# Patient Record
Sex: FEMALE | Race: WHITE | NOT HISPANIC OR LATINO | Employment: OTHER | URBAN - METROPOLITAN AREA
[De-identification: names, ages, dates, MRNs, and addresses within clinical notes are randomized per-mention and may not be internally consistent; named-entity substitution may affect disease eponyms.]

---

## 2024-06-21 ENCOUNTER — HOSPITAL ENCOUNTER (OUTPATIENT)
Facility: HOSPITAL | Age: 84
Setting detail: OBSERVATION
Discharge: HOME | End: 2024-06-22
Attending: EMERGENCY MEDICINE | Admitting: STUDENT IN AN ORGANIZED HEALTH CARE EDUCATION/TRAINING PROGRAM
Payer: MEDICARE

## 2024-06-21 ENCOUNTER — APPOINTMENT (OUTPATIENT)
Dept: RADIOLOGY | Facility: HOSPITAL | Age: 84
End: 2024-06-21
Payer: MEDICARE

## 2024-06-21 ENCOUNTER — APPOINTMENT (OUTPATIENT)
Dept: CARDIOLOGY | Facility: HOSPITAL | Age: 84
End: 2024-06-21
Payer: MEDICARE

## 2024-06-21 DIAGNOSIS — R55 SYNCOPE AND COLLAPSE: ICD-10-CM

## 2024-06-21 DIAGNOSIS — R60.0 LOCALIZED EDEMA: ICD-10-CM

## 2024-06-21 DIAGNOSIS — E83.42 HYPOMAGNESEMIA: ICD-10-CM

## 2024-06-21 DIAGNOSIS — R55 SYNCOPE, UNSPECIFIED SYNCOPE TYPE: Primary | ICD-10-CM

## 2024-06-21 DIAGNOSIS — I49.8 OTHER CARDIAC ARRHYTHMIA: ICD-10-CM

## 2024-06-21 PROBLEM — E55.9 VITAMIN D DEFICIENCY: Status: ACTIVE | Noted: 2023-06-27

## 2024-06-21 PROBLEM — I10 ESSENTIAL HYPERTENSION: Status: ACTIVE | Noted: 2023-06-27

## 2024-06-21 PROBLEM — D64.9 ANEMIA: Status: ACTIVE | Noted: 2024-01-11

## 2024-06-21 PROBLEM — I49.9 CARDIAC ARRHYTHMIA: Status: ACTIVE | Noted: 2024-06-21

## 2024-06-21 PROBLEM — N18.32 STAGE 3B CHRONIC KIDNEY DISEASE (MULTI): Status: ACTIVE | Noted: 2024-01-11

## 2024-06-21 PROBLEM — E03.9 HYPOTHYROIDISM, UNSPECIFIED: Status: ACTIVE | Noted: 2021-10-23

## 2024-06-21 PROBLEM — E78.2 MIXED HYPERLIPIDEMIA: Status: ACTIVE | Noted: 2023-06-27

## 2024-06-21 LAB
ALBUMIN SERPL BCP-MCNC: 3.5 G/DL (ref 3.4–5)
ALP SERPL-CCNC: 38 U/L (ref 33–136)
ALT SERPL W P-5'-P-CCNC: 11 U/L (ref 7–45)
ANION GAP SERPL CALC-SCNC: 13 MMOL/L (ref 10–20)
AST SERPL W P-5'-P-CCNC: 17 U/L (ref 9–39)
BASOPHILS # BLD AUTO: 0.03 X10*3/UL (ref 0–0.1)
BASOPHILS NFR BLD AUTO: 0.5 %
BILIRUB SERPL-MCNC: 0.5 MG/DL (ref 0–1.2)
BNP SERPL-MCNC: 83 PG/ML (ref 0–99)
BUN SERPL-MCNC: 36 MG/DL (ref 6–23)
CALCIUM SERPL-MCNC: 8.9 MG/DL (ref 8.6–10.3)
CARDIAC TROPONIN I PNL SERPL HS: 4 NG/L (ref 0–13)
CARDIAC TROPONIN I PNL SERPL HS: 4 NG/L (ref 0–13)
CHLORIDE SERPL-SCNC: 108 MMOL/L (ref 98–107)
CO2 SERPL-SCNC: 25 MMOL/L (ref 21–32)
CREAT SERPL-MCNC: 1.8 MG/DL (ref 0.5–1.05)
D DIMER PPP FEU-MCNC: 1738 NG/ML FEU
EGFRCR SERPLBLD CKD-EPI 2021: 27 ML/MIN/1.73M*2
EOSINOPHIL # BLD AUTO: 0.04 X10*3/UL (ref 0–0.4)
EOSINOPHIL NFR BLD AUTO: 0.7 %
ERYTHROCYTE [DISTWIDTH] IN BLOOD BY AUTOMATED COUNT: 14.5 % (ref 11.5–14.5)
GLUCOSE SERPL-MCNC: 103 MG/DL (ref 74–99)
HCT VFR BLD AUTO: 34.5 % (ref 36–46)
HGB BLD-MCNC: 10.9 G/DL (ref 12–16)
IMM GRANULOCYTES # BLD AUTO: 0.03 X10*3/UL (ref 0–0.5)
IMM GRANULOCYTES NFR BLD AUTO: 0.5 % (ref 0–0.9)
INR PPP: 1 (ref 0.9–1.1)
LYMPHOCYTES # BLD AUTO: 1.36 X10*3/UL (ref 0.8–3)
LYMPHOCYTES NFR BLD AUTO: 22.9 %
MAGNESIUM SERPL-MCNC: 1.51 MG/DL (ref 1.6–2.4)
MCH RBC QN AUTO: 29.6 PG (ref 26–34)
MCHC RBC AUTO-ENTMCNC: 31.6 G/DL (ref 32–36)
MCV RBC AUTO: 94 FL (ref 80–100)
MONOCYTES # BLD AUTO: 0.45 X10*3/UL (ref 0.05–0.8)
MONOCYTES NFR BLD AUTO: 7.6 %
NEUTROPHILS # BLD AUTO: 4.03 X10*3/UL (ref 1.6–5.5)
NEUTROPHILS NFR BLD AUTO: 67.8 %
NRBC BLD-RTO: 0 /100 WBCS (ref 0–0)
PLATELET # BLD AUTO: 159 X10*3/UL (ref 150–450)
POTASSIUM SERPL-SCNC: 4.6 MMOL/L (ref 3.5–5.3)
PROT SERPL-MCNC: 6.2 G/DL (ref 6.4–8.2)
PROTHROMBIN TIME: 11.4 SECONDS (ref 9.8–12.8)
RBC # BLD AUTO: 3.68 X10*6/UL (ref 4–5.2)
SODIUM SERPL-SCNC: 141 MMOL/L (ref 136–145)
T4 FREE SERPL-MCNC: 1.38 NG/DL (ref 0.61–1.12)
TSH SERPL-ACNC: 0.24 MIU/L (ref 0.44–3.98)
WBC # BLD AUTO: 5.9 X10*3/UL (ref 4.4–11.3)

## 2024-06-21 PROCEDURE — 83735 ASSAY OF MAGNESIUM: CPT

## 2024-06-21 PROCEDURE — 85610 PROTHROMBIN TIME: CPT

## 2024-06-21 PROCEDURE — 36415 COLL VENOUS BLD VENIPUNCTURE: CPT

## 2024-06-21 PROCEDURE — A9540 TC99M MAA: HCPCS | Performed by: EMERGENCY MEDICINE

## 2024-06-21 PROCEDURE — 96366 THER/PROPH/DIAG IV INF ADDON: CPT | Mod: 59 | Performed by: STUDENT IN AN ORGANIZED HEALTH CARE EDUCATION/TRAINING PROGRAM

## 2024-06-21 PROCEDURE — 84439 ASSAY OF FREE THYROXINE: CPT | Performed by: EMERGENCY MEDICINE

## 2024-06-21 PROCEDURE — 78830 RP LOCLZJ TUM SPECT W/CT 1: CPT

## 2024-06-21 PROCEDURE — 84484 ASSAY OF TROPONIN QUANT: CPT

## 2024-06-21 PROCEDURE — 71045 X-RAY EXAM CHEST 1 VIEW: CPT

## 2024-06-21 PROCEDURE — 84443 ASSAY THYROID STIM HORMONE: CPT | Performed by: EMERGENCY MEDICINE

## 2024-06-21 PROCEDURE — 93005 ELECTROCARDIOGRAM TRACING: CPT

## 2024-06-21 PROCEDURE — 3430000001 HC RX 343 DIAGNOSTIC RADIOPHARMACEUTICALS: Performed by: EMERGENCY MEDICINE

## 2024-06-21 PROCEDURE — 71045 X-RAY EXAM CHEST 1 VIEW: CPT | Performed by: RADIOLOGY

## 2024-06-21 PROCEDURE — 96365 THER/PROPH/DIAG IV INF INIT: CPT | Mod: 59 | Performed by: STUDENT IN AN ORGANIZED HEALTH CARE EDUCATION/TRAINING PROGRAM

## 2024-06-21 PROCEDURE — 85025 COMPLETE CBC W/AUTO DIFF WBC: CPT

## 2024-06-21 PROCEDURE — 99285 EMERGENCY DEPT VISIT HI MDM: CPT | Performed by: EMERGENCY MEDICINE

## 2024-06-21 PROCEDURE — 96372 THER/PROPH/DIAG INJ SC/IM: CPT | Performed by: NURSE PRACTITIONER

## 2024-06-21 PROCEDURE — 85379 FIBRIN DEGRADATION QUANT: CPT | Performed by: EMERGENCY MEDICINE

## 2024-06-21 PROCEDURE — G0378 HOSPITAL OBSERVATION PER HR: HCPCS

## 2024-06-21 PROCEDURE — 84075 ASSAY ALKALINE PHOSPHATASE: CPT

## 2024-06-21 PROCEDURE — 2500000004 HC RX 250 GENERAL PHARMACY W/ HCPCS (ALT 636 FOR OP/ED): Performed by: NURSE PRACTITIONER

## 2024-06-21 PROCEDURE — 78830 RP LOCLZJ TUM SPECT W/CT 1: CPT | Performed by: RADIOLOGY

## 2024-06-21 PROCEDURE — 83880 ASSAY OF NATRIURETIC PEPTIDE: CPT

## 2024-06-21 PROCEDURE — 84484 ASSAY OF TROPONIN QUANT: CPT | Mod: 91

## 2024-06-21 PROCEDURE — 2500000004 HC RX 250 GENERAL PHARMACY W/ HCPCS (ALT 636 FOR OP/ED): Performed by: EMERGENCY MEDICINE

## 2024-06-21 PROCEDURE — 93010 ELECTROCARDIOGRAM REPORT: CPT | Performed by: EMERGENCY MEDICINE

## 2024-06-21 PROCEDURE — 93971 EXTREMITY STUDY: CPT

## 2024-06-21 PROCEDURE — 93971 EXTREMITY STUDY: CPT | Performed by: INTERNAL MEDICINE

## 2024-06-21 PROCEDURE — 99285 EMERGENCY DEPT VISIT HI MDM: CPT | Mod: 25

## 2024-06-21 RX ORDER — ATORVASTATIN CALCIUM 10 MG/1
10 TABLET, FILM COATED ORAL DAILY
COMMUNITY

## 2024-06-21 RX ORDER — PIOGLITAZONEHYDROCHLORIDE 15 MG/1
15 TABLET ORAL DAILY
COMMUNITY

## 2024-06-21 RX ORDER — ONDANSETRON HYDROCHLORIDE 2 MG/ML
4 INJECTION, SOLUTION INTRAVENOUS EVERY 8 HOURS PRN
Status: DISCONTINUED | OUTPATIENT
Start: 2024-06-21 | End: 2024-06-22 | Stop reason: HOSPADM

## 2024-06-21 RX ORDER — ACETAMINOPHEN 325 MG/1
650 TABLET ORAL EVERY 4 HOURS PRN
Status: DISCONTINUED | OUTPATIENT
Start: 2024-06-21 | End: 2024-06-22 | Stop reason: HOSPADM

## 2024-06-21 RX ORDER — OLMESARTAN MEDOXOMIL 20 MG/1
20 TABLET ORAL DAILY
COMMUNITY

## 2024-06-21 RX ORDER — ONDANSETRON 4 MG/1
4 TABLET, ORALLY DISINTEGRATING ORAL EVERY 8 HOURS PRN
Status: DISCONTINUED | OUTPATIENT
Start: 2024-06-21 | End: 2024-06-22 | Stop reason: HOSPADM

## 2024-06-21 RX ORDER — VALSARTAN 160 MG/1
160 TABLET ORAL DAILY
Status: DISCONTINUED | OUTPATIENT
Start: 2024-06-22 | End: 2024-06-22 | Stop reason: HOSPADM

## 2024-06-21 RX ORDER — OLMESARTAN MEDOXOMIL 20 MG/1
20 TABLET ORAL DAILY
Status: DISCONTINUED | OUTPATIENT
Start: 2024-06-21 | End: 2024-06-21

## 2024-06-21 RX ORDER — ACETAMINOPHEN 160 MG/5ML
650 SOLUTION ORAL EVERY 4 HOURS PRN
Status: DISCONTINUED | OUTPATIENT
Start: 2024-06-21 | End: 2024-06-22 | Stop reason: HOSPADM

## 2024-06-21 RX ORDER — ACETAMINOPHEN 650 MG/1
650 SUPPOSITORY RECTAL EVERY 4 HOURS PRN
Status: DISCONTINUED | OUTPATIENT
Start: 2024-06-21 | End: 2024-06-22 | Stop reason: HOSPADM

## 2024-06-21 RX ORDER — HEPARIN SODIUM 5000 [USP'U]/ML
5000 INJECTION, SOLUTION INTRAVENOUS; SUBCUTANEOUS EVERY 8 HOURS SCHEDULED
Status: DISCONTINUED | OUTPATIENT
Start: 2024-06-21 | End: 2024-06-22 | Stop reason: HOSPADM

## 2024-06-21 RX ORDER — LEVOTHYROXINE SODIUM 88 UG/1
88 CAPSULE ORAL
COMMUNITY

## 2024-06-21 RX ORDER — LEVOTHYROXINE SODIUM 88 UG/1
88 TABLET ORAL
Status: DISCONTINUED | OUTPATIENT
Start: 2024-06-22 | End: 2024-06-22 | Stop reason: HOSPADM

## 2024-06-21 RX ORDER — MAGNESIUM SULFATE HEPTAHYDRATE 40 MG/ML
2 INJECTION, SOLUTION INTRAVENOUS ONCE
Status: COMPLETED | OUTPATIENT
Start: 2024-06-21 | End: 2024-06-21

## 2024-06-21 RX ORDER — ATORVASTATIN CALCIUM 10 MG/1
10 TABLET, FILM COATED ORAL DAILY
Status: DISCONTINUED | OUTPATIENT
Start: 2024-06-21 | End: 2024-06-22 | Stop reason: HOSPADM

## 2024-06-21 RX ORDER — POLYETHYLENE GLYCOL 3350 17 G/17G
17 POWDER, FOR SOLUTION ORAL DAILY PRN
Status: DISCONTINUED | OUTPATIENT
Start: 2024-06-21 | End: 2024-06-22 | Stop reason: HOSPADM

## 2024-06-21 SDOH — ECONOMIC STABILITY: TRANSPORTATION INSECURITY
IN THE PAST 12 MONTHS, HAS LACK OF TRANSPORTATION KEPT YOU FROM MEETINGS, WORK, OR FROM GETTING THINGS NEEDED FOR DAILY LIVING?: PATIENT DECLINED

## 2024-06-21 SDOH — ECONOMIC STABILITY: INCOME INSECURITY: HOW HARD IS IT FOR YOU TO PAY FOR THE VERY BASICS LIKE FOOD, HOUSING, MEDICAL CARE, AND HEATING?: PATIENT DECLINED

## 2024-06-21 SDOH — ECONOMIC STABILITY: TRANSPORTATION INSECURITY
IN THE PAST 12 MONTHS, HAS THE LACK OF TRANSPORTATION KEPT YOU FROM MEDICAL APPOINTMENTS OR FROM GETTING MEDICATIONS?: PATIENT DECLINED

## 2024-06-21 SDOH — ECONOMIC STABILITY: INCOME INSECURITY: IN THE LAST 12 MONTHS, WAS THERE A TIME WHEN YOU WERE NOT ABLE TO PAY THE MORTGAGE OR RENT ON TIME?: PATIENT DECLINED

## 2024-06-21 SDOH — ECONOMIC STABILITY: HOUSING INSECURITY
IN THE LAST 12 MONTHS, WAS THERE A TIME WHEN YOU DID NOT HAVE A STEADY PLACE TO SLEEP OR SLEPT IN A SHELTER (INCLUDING NOW)?: PATIENT DECLINED

## 2024-06-21 SDOH — SOCIAL STABILITY: SOCIAL INSECURITY: WERE YOU ABLE TO COMPLETE ALL THE BEHAVIORAL HEALTH SCREENINGS?: YES

## 2024-06-21 SDOH — ECONOMIC STABILITY: HOUSING INSECURITY: IN THE LAST 12 MONTHS, HOW MANY PLACES HAVE YOU LIVED?: 1

## 2024-06-21 SDOH — SOCIAL STABILITY: SOCIAL INSECURITY: HAVE YOU HAD THOUGHTS OF HARMING ANYONE ELSE?: NO

## 2024-06-21 ASSESSMENT — COGNITIVE AND FUNCTIONAL STATUS - GENERAL
MOVING TO AND FROM BED TO CHAIR: A LITTLE
TURNING FROM BACK TO SIDE WHILE IN FLAT BAD: A LITTLE
WALKING IN HOSPITAL ROOM: A LITTLE
MOVING FROM LYING ON BACK TO SITTING ON SIDE OF FLAT BED WITH BEDRAILS: A LITTLE
DAILY ACTIVITIY SCORE: 24
PATIENT BASELINE BEDBOUND: NO
MOBILITY SCORE: 19
STANDING UP FROM CHAIR USING ARMS: A LITTLE

## 2024-06-21 ASSESSMENT — ACTIVITIES OF DAILY LIVING (ADL)
HEARING - LEFT EAR: FUNCTIONAL
TOILETING: INDEPENDENT
HEARING - RIGHT EAR: FUNCTIONAL
GROOMING: INDEPENDENT
JUDGMENT_ADEQUATE_SAFELY_COMPLETE_DAILY_ACTIVITIES: YES
WALKS IN HOME: INDEPENDENT
DRESSING YOURSELF: INDEPENDENT
ADEQUATE_TO_COMPLETE_ADL: YES
BATHING: INDEPENDENT
PATIENT'S MEMORY ADEQUATE TO SAFELY COMPLETE DAILY ACTIVITIES?: YES
FEEDING YOURSELF: INDEPENDENT

## 2024-06-21 ASSESSMENT — LIFESTYLE VARIABLES
HOW OFTEN DO YOU HAVE A DRINK CONTAINING ALCOHOL: NEVER
HOW MANY STANDARD DRINKS CONTAINING ALCOHOL DO YOU HAVE ON A TYPICAL DAY: PATIENT DOES NOT DRINK
EVER HAD A DRINK FIRST THING IN THE MORNING TO STEADY YOUR NERVES TO GET RID OF A HANGOVER: NO
AUDIT-C TOTAL SCORE: 0
HAVE PEOPLE ANNOYED YOU BY CRITICIZING YOUR DRINKING: NO
TOTAL SCORE: 0
AUDIT-C TOTAL SCORE: 0
EVER FELT BAD OR GUILTY ABOUT YOUR DRINKING: NO
HAVE YOU EVER FELT YOU SHOULD CUT DOWN ON YOUR DRINKING: NO
HOW OFTEN DO YOU HAVE 6 OR MORE DRINKS ON ONE OCCASION: NEVER
SKIP TO QUESTIONS 9-10: 1

## 2024-06-21 ASSESSMENT — PATIENT HEALTH QUESTIONNAIRE - PHQ9
SUM OF ALL RESPONSES TO PHQ9 QUESTIONS 1 & 2: 0
2. FEELING DOWN, DEPRESSED OR HOPELESS: NOT AT ALL
1. LITTLE INTEREST OR PLEASURE IN DOING THINGS: NOT AT ALL

## 2024-06-21 ASSESSMENT — COLUMBIA-SUICIDE SEVERITY RATING SCALE - C-SSRS
1. IN THE PAST MONTH, HAVE YOU WISHED YOU WERE DEAD OR WISHED YOU COULD GO TO SLEEP AND NOT WAKE UP?: NO
2. HAVE YOU ACTUALLY HAD ANY THOUGHTS OF KILLING YOURSELF?: NO
6. HAVE YOU EVER DONE ANYTHING, STARTED TO DO ANYTHING, OR PREPARED TO DO ANYTHING TO END YOUR LIFE?: NO

## 2024-06-21 ASSESSMENT — ENCOUNTER SYMPTOMS
CHEST TIGHTNESS: 0
LIGHT-HEADEDNESS: 0
WEAKNESS: 0
SHORTNESS OF BREATH: 0
PALPITATIONS: 0
CONSTITUTIONAL NEGATIVE: 1
DIZZINESS: 0
GASTROINTESTINAL NEGATIVE: 1
EYES NEGATIVE: 1
MUSCULOSKELETAL NEGATIVE: 1
PSYCHIATRIC NEGATIVE: 1

## 2024-06-21 ASSESSMENT — PAIN SCALES - GENERAL
PAINLEVEL_OUTOF10: 0 - NO PAIN
PAINLEVEL_OUTOF10: 0 - NO PAIN

## 2024-06-21 ASSESSMENT — PAIN - FUNCTIONAL ASSESSMENT
PAIN_FUNCTIONAL_ASSESSMENT: 0-10
PAIN_FUNCTIONAL_ASSESSMENT: 0-10

## 2024-06-21 NOTE — H&P
History Of Present Illness  Lyndsey Rosado is a 84 y.o. female presents to [facility] ER with chief complaint of witnessed syncopal episode. Patient was at the bank today with a friend. Patient was upset with . Friend observed patient have brief 20 second episode of syncope. She had no symptoms prior to event. When EMS arrived, they reported to ER they believe they saw a brief episode of atrial fib/atrial flutter on the cardiac monitor, ER reports patient arrived in SR.   denies symptoms currently.  No preceding chest pain, shortness of breath, palpitations, dizziness, lightheadedness, headaches, vision changes.  EMS report she had a short run of atrial flutter on their rhythm strip.  No diagnose history of atrial fibrillation and she is not anticoagulated. Patient centers more around the episode with the  and does not devote much history to events of her syncope. She does state she had no symptoms prior to her friend telling her she passed out and does not recollect event. She states she is still mad and maybe when she settles down. Currently has no complaints        Past Medical History  Age-related osteoporosis without current pathological fracture (CMS/HCC)   Congenital hypothyroidism without goiter (CMS/HCC)   Essential hypertension (CMS/HCC)   Mixed hyperlipidemia (CMS/HCC)   Type 2 diabetes mellitus without complications (CMS/HCC)   Vitamin D deficiency   Closed fracture of shaft of humerus   Disorder of bursae of shoulder region   Difficulty in walking, not elsewhere classified   History of falling   Hypertensive heart disease without heart failure (CMS/HCC)   Hypothyroidism, unspecified (CMS/HCC)   Multiple fractures of ribs, unspecified side, subsequent encounter for fracture with routine healing   Muscle weakness (generalized)   Nondisplaced fracture of greater tuberosity of left humerus, subsequent encounter for fracture with routine healing   Other specified  disorders of bone density and structure, unspecified site   Rash and other nonspecific skin eruption   Unspecified fracture of upper end of left ulna, subsequent encounter for closed fracture with routine healing   Unspecified injury of head, subsequent encounter     Surgical History  CATARACT EXTRACTION Bilateral 2012   CHOLECYSTECTOMY   HUMERUS FRACTURE SURGERY Right 2007   TONSILLECTOMY   TOTAL ABDOMINAL HYSTERECTOMY      Social History  Smoking status: Never   Smokeless tobacco: Never   Substance Use Topics   Alcohol use: Never   Drug use: Never     Family History  No family history on file.     Allergies  Patient has no known allergies.    Review of Systems   Constitutional: Negative.    HENT: Negative.     Eyes: Negative.    Respiratory:  Negative for chest tightness and shortness of breath.    Cardiovascular:  Positive for leg swelling. Negative for chest pain and palpitations.   Gastrointestinal: Negative.    Genitourinary: Negative.    Musculoskeletal: Negative.    Skin:  Positive for rash.   Neurological:  Negative for dizziness, weakness and light-headedness.   Psychiatric/Behavioral: Negative.        ROS: 12 systems reviewed and negative except per HPI above     Physical Exam by System:     Constitutional: Well developed, awake/alert/oriented x3, no distress, alert and cooperative   ENMT: mucous membranes moist   Head/Neck: Neck supple   Respiratory/Thorax: Patent airways, CTAB, normal breath sounds with good chest expansion, thorax symmetric   Cardiovascular: Regular, rate and rhythm, no murmurs, 2+ equal pulses of the extremities, normal S 1and S 2   Gastrointestinal: Nondistended, soft, non-tender, no rebound tenderness or guarding, no masses palpable, no organomegaly, +BS, no bruits   Musculoskeletal: ROM intact, no joint swelling, normal strength   Extremities: normal extremities, no cyanosis edema, contusions or wounds, no clubbing   Neurological: alert and oriented x3, intact senses, motor,  "response and reflexes, normal strength  SKIN-has rash on lower extremities and over face, upper arms-states has had it for a while and its getting better, did not know why she had it       Last Recorded Vitals  Blood pressure 176/55, pulse 56, temperature 36.1 °C (97 °F), temperature source Temporal, resp. rate 19, height 1.651 m (5' 5\"), weight 81.6 kg (180 lb), SpO2 100%.    Relevant Results  Results for orders placed or performed during the hospital encounter of 06/21/24 (from the past 24 hour(s))   CBC and Auto Differential   Result Value Ref Range    WBC 5.9 4.4 - 11.3 x10*3/uL    nRBC 0.0 0.0 - 0.0 /100 WBCs    RBC 3.68 (L) 4.00 - 5.20 x10*6/uL    Hemoglobin 10.9 (L) 12.0 - 16.0 g/dL    Hematocrit 34.5 (L) 36.0 - 46.0 %    MCV 94 80 - 100 fL    MCH 29.6 26.0 - 34.0 pg    MCHC 31.6 (L) 32.0 - 36.0 g/dL    RDW 14.5 11.5 - 14.5 %    Platelets 159 150 - 450 x10*3/uL    Neutrophils % 67.8 40.0 - 80.0 %    Immature Granulocytes %, Automated 0.5 0.0 - 0.9 %    Lymphocytes % 22.9 13.0 - 44.0 %    Monocytes % 7.6 2.0 - 10.0 %    Eosinophils % 0.7 0.0 - 6.0 %    Basophils % 0.5 0.0 - 2.0 %    Neutrophils Absolute 4.03 1.60 - 5.50 x10*3/uL    Immature Granulocytes Absolute, Automated 0.03 0.00 - 0.50 x10*3/uL    Lymphocytes Absolute 1.36 0.80 - 3.00 x10*3/uL    Monocytes Absolute 0.45 0.05 - 0.80 x10*3/uL    Eosinophils Absolute 0.04 0.00 - 0.40 x10*3/uL    Basophils Absolute 0.03 0.00 - 0.10 x10*3/uL   Comprehensive metabolic panel   Result Value Ref Range    Glucose 103 (H) 74 - 99 mg/dL    Sodium 141 136 - 145 mmol/L    Potassium 4.6 3.5 - 5.3 mmol/L    Chloride 108 (H) 98 - 107 mmol/L    Bicarbonate 25 21 - 32 mmol/L    Anion Gap 13 10 - 20 mmol/L    Urea Nitrogen 36 (H) 6 - 23 mg/dL    Creatinine 1.80 (H) 0.50 - 1.05 mg/dL    eGFR 27 (L) >60 mL/min/1.73m*2    Calcium 8.9 8.6 - 10.3 mg/dL    Albumin 3.5 3.4 - 5.0 g/dL    Alkaline Phosphatase 38 33 - 136 U/L    Total Protein 6.2 (L) 6.4 - 8.2 g/dL    AST 17 9 - 39 U/L "    Bilirubin, Total 0.5 0.0 - 1.2 mg/dL    ALT 11 7 - 45 U/L   B-Type Natriuretic Peptide   Result Value Ref Range    BNP 83 0 - 99 pg/mL   Protime-INR   Result Value Ref Range    Protime 11.4 9.8 - 12.8 seconds    INR 1.0 0.9 - 1.1   Troponin I, High Sensitivity, Initial   Result Value Ref Range    Troponin I, High Sensitivity 4 0 - 13 ng/L   Magnesium   Result Value Ref Range    Magnesium 1.51 (L) 1.60 - 2.40 mg/dL   D-dimer, VTE Exclusion   Result Value Ref Range    D-Dimer, Quantitative VTE Exclusion 1,738 (H) <=500 ng/mL FEU   TSH with reflex to Free T4 if abnormal   Result Value Ref Range    Thyroid Stimulating Hormone 0.24 (L) 0.44 - 3.98 mIU/L   Thyroxine, Free   Result Value Ref Range    Thyroxine, Free 1.38 (H) 0.61 - 1.12 ng/dL   Troponin, High Sensitivity, 1 Hour   Result Value Ref Range    Troponin I, High Sensitivity 4 0 - 13 ng/L      Scheduled medications    Continuous medications    PRN medications       NM Lung perfusion with spect/ct    Result Date: 6/21/2024  Interpreted By:  Dajuan Salazar, STUDY: NM LUNG PERFUSION WITH SPECT/CT;  6/21/2024 1:59 pm   INDICATION: Signs/Symptoms:pos dimer, syncope.   COMPARISON: None.   ACCESSION NUMBER(S): [id number]   ORDERING CLINICIAN: ESSENCE DUBON   TECHNIQUE: DIVISION OF NUCLEAR MEDICINE PERFUSION LUNG SCAN   Multiple perfusion images of the lungs were obtained after the intravenous administration of  4.2 mCi of Tc-99m MAA. SPECT CT images of the lungs were also obtained.     FINDINGS: Perfusion images demonstrate mild heterogeneity without evidence of wedge-shaped abnormalities to suggest acute pulmonary embolism.       Low probability of acute pulmonary embolism.   Images were interpreted at [facility].   Signed by: Dajuan Salazar 6/21/2024 2:22 PM Dictation workstation:   [id number]    Lower extremity venous duplex left    Result Date: 6/21/2024  Preliminary Cardiology Report            [facility] [address]  [place] [address]     Tel [phone] Fax [phone]       Preliminary Vascular Lab Report  VASC US LOWER EXTREMITY VENOUS DUPLEX LEFT  Patient Name:     GISELLA POE Kenny Physician:  [id number] Jaqui Vivar MD Study Date:       6/21/2024         Ordering Provider:  [id number] ESSENCE DUBON MRN/PID:          [id number]          Fellow: Accession#:       [id number]      Technologist:       Nathaly Hernandez RVT YOB: 1940         Technologist 2: Gender:           F                 Encounter#:         [id number] Admission Status: Emergency         Location Performed: [facility]  Diagnosis/ICD: Localized (leg) edema-R60.0 Indication:    Limb edema CPT Codes:     18603 Peripheral venous duplex scan for DVT Limited  Pertinent History: HTN and Hyperlipidemia. DM.  PRELIMINARY CONCLUSIONS:  Right Lower Venous: The right common femoral vein demonstrates normal spontaneous and respirophasic flow. Left Lower Venous: No evidence of acute deep vein thrombus visualized in the left lower extremity. There are chronic changes visualized in the popliteal vein. Additional Findings; Lymph nodes Measuring 2.05 cm x 0.71 cm.  Imaging & Doppler Findings:  Left                  Compress Thrombus        Flow Distal External Iliac            None CFV                     Yes      None   Spontaneous/Phasic PFV                     Yes      None FV Proximal             Yes      None   Spontaneous/Phasic FV Mid                  Yes      None FV Distal               Yes      None Popliteal               Yes    Chronic  Spontaneous/Phasic Peroneal                Yes      None PTV                     Yes      None VASCULAR PRELIMINARY REPORT completed by Nathaly Hernandez RVT on 6/21/2024 at 12:58:18 PM  ** Final **     Vascular US lower extremity venous duplex left    Result Date: 6/21/2024  Preliminary Cardiology Report          [facility] [address] [place] [address]     Tel [phone]  Fax [phone]      Preliminary Vascular Lab Report VASC US LOWER EXTREMITY VENOUS DUPLEX LEFT Patient Name:     GISELLA POE Reading Physician:  [id number] Jaqui Vivar MD Study Date:       6/21/2024         Ordering Provider:  [id number] ESSENCE DUBON MRN/PID:          [id number]          Fellow: Accession#:       [id number]      Technologist:       Nathaly Hernandez RVT YOB: 1940         Technologist 2: Gender:           F                 Encounter#:         [id number] Admission Status: Emergency         Location Performed: [facility] Diagnosis/ICD: Localized (leg) edema-R60.0 Indication:    Limb edema CPT Codes:     57002 Peripheral venous duplex scan for DVT Limited Pertinent History: HTN and Hyperlipidemia. DM. PRELIMINARY CONCLUSIONS: Right Lower Venous: The right common femoral vein demonstrates normal spontaneous and respirophasic flow. Left Lower Venous: No evidence of acute deep vein thrombus visualized in the left lower extremity. There are chronic changes visualized in the popliteal vein. Additional Findings; Lymph nodes Measuring 2.05 cm x 0.71 cm. Imaging \T\ Doppler Findings: Left                  Compress Thrombus        Flow Distal External Iliac            None CFV                     Yes      None   Spontaneous/Phasic PFV                     Yes      None FV Proximal             Yes      None   Spontaneous/Phasic FV Mid                  Yes      None FV Distal               Yes      None Popliteal               Yes    Chronic  Spontaneous/Phasic Peroneal                Yes      None PTV                     Yes      None VASCULAR PRELIMINARY REPORT completed by Nathaly Hernandez RVT on 6/21/2024 at 12:58:18 PM ** Final **    XR chest 1 view    Result Date: 6/21/2024  Interpreted By:  Destin Paz, STUDY: XR CHEST 1 VIEW; 6/21/2024 12:23 pm   INDICATION: CLINICAL INFORMATION: Signs/Symptoms:syncope.   COMPARISON: 08/01/2023   ACCESSION NUMBER(S): [id number]   ORDERING CLINICIAN:  RHEA CROSS   TECHNIQUE: Portable chest one view.   FINDINGS: The cardiac size is indeterminate in view of the AP projection.    No infiltrates or effusions are identified.  Incidental note is made of a screw and plate fixation device involving the mid shaft of the right humerus partially included within the field of view. Spurring is noted throughout the spine.       No acute pathologic findings are identified.   MACRO: none   Signed by: Destin Paz 6/21/2024 12:39 PM Dictation workstation:   [id number]       Assessment/Plan   Principal Problem:    Syncope  Active Problems:    Mixed hyperlipidemia    Hypothyroidism, unspecified    Essential hypertension    Anemia    Stage 3b chronic kidney disease (Multi)    Vitamin D deficiency    Cardiac arrhythmia    Hypomagnesemia      Plan:    Admit to observation with tele  Monitor for any cardiac arrhythmias overnight, benefit from cardiac event monitor on discharge  EKG reviewed and compared to previous SR with RBBB  TSH obtained 0.24, Free t4 1.38  Magnesium 1.51, received 2 G magnesium in ED, monitor magnesium on am labs   Hx anemia, stable 10.9 which is above baseline  Cr slightly elevated than baseline, history CKD 3b  Obtain orthostatic vital signs  Elevated d dimer, vq scan obtained with low probability for PE  Resume patient medications  Am labs including cbc, bmp, mag  Dvtp: Heparin sub q  POC discussed with patient and attending  Dispo: likely require less than 2 midnight stays to adequately treat and safely discharge tomorrow to home      Code status: Full Code     I spent >50 minutes in the professional and overall care of this patient.    SIGNATURE: LETI Rose PATIENT NAME: Lyndsey Rosado   DATE: June 21, 2024 MRN: [id number]   TIME: 3:27 PM    Isabela Clifford CNP  [facility]  [address] [address]  Phone: [phone] Fax: [phone]

## 2024-06-21 NOTE — ED PROVIDER NOTES
HPI   Chief Complaint   Patient presents with    Syncope       84-year-old female with past medical history of hypertension, hyperlipidemia, diabetes, hypothyroidism, CKD, basal cell carcinoma of the head presenting to the ED for syncope.  Patient was at the bank this morning in a seated position attempting to withdraw money when her boss noticed that she had a syncopal episode for about 20 seconds.  Patient denies symptoms prior to her syncope and denies symptoms currently.  No preceding chest pain, shortness of breath, palpitations, dizziness, lightheadedness, headaches, vision changes.  EMS report she had a short run of atrial flutter on their rhythm strip.  No diagnose history of atrial fibrillation and she is not anticoagulated.                          [place] Coma Scale Score: 15                     Patient History   Past Medical History:   Diagnosis Date    [place] (hard of hearing)     Hypothyroid      History reviewed. No pertinent surgical history.  No family history on file.  Social History     Tobacco Use    Smoking status: Never    Smokeless tobacco: Never   Substance Use Topics    Alcohol use: Never    Drug use: Not on file       Physical Exam   ED Triage Vitals [06/21/24 1047]   Temperature Heart Rate Respirations BP   36.1 °C (97 °F) 73 18 126/53      Pulse Ox Temp Source Heart Rate Source Patient Position   99 % Temporal -- --      BP Location FiO2 (%)     Right arm --       Physical Exam  Vitals and nursing note reviewed.   Constitutional:       General: She is not in acute distress.     Appearance: She is not ill-appearing or toxic-appearing.   HENT:      Head: Normocephalic and atraumatic.      Nose: Nose normal.      Mouth/Throat:      Mouth: Mucous membranes are moist.   Eyes:      Extraocular Movements: Extraocular movements intact.      Conjunctiva/sclera: Conjunctivae normal.      Pupils: Pupils are equal, round, and reactive to light.   Cardiovascular:      Rate and Rhythm: Normal rate and  regular rhythm.      Pulses: Normal pulses.      Heart sounds: Normal heart sounds.   Pulmonary:      Effort: Pulmonary effort is normal.      Breath sounds: Normal breath sounds.   Abdominal:      General: There is no distension.      Palpations: Abdomen is soft.      Tenderness: There is no abdominal tenderness.   Musculoskeletal:         General: No tenderness. Normal range of motion.      Cervical back: Normal range of motion and neck supple.      Right lower leg: No edema.      Left lower leg: Edema present.   Skin:     General: Skin is warm and dry.      Capillary Refill: Capillary refill takes less than 2 seconds.   Neurological:      General: No focal deficit present.      Mental Status: She is alert and oriented to person, place, and time. Mental status is at baseline.      Cranial Nerves: No cranial nerve deficit.      Sensory: No sensory deficit.      Motor: No weakness.         ED Course & MDM   ED Course as of 06/21/24 1503   Fri Jun 21, 2024   1458 84-year-old female presenting to the ED for syncope while sitting.  This was witnessed.  She is alert and neurologically intact on my evaluation.  She does have hypomagnesemia that was replaced.  Troponins are unremarkable however does have elevated D-dimer.  VQ scan was obtained for PE rule out given her renal function.  This is low probability for PE.  No evidence of DVT on lower extremity duplex.  Renal function appears to be consistent with her CKD.    Due to syncope while seated without preceding symptoms and concern for atrial flutter on EMS telemetry we will admit for cardiac evaluation. [MJ]      ED Course User Index  [MJ] Leann Sanchez DO         Diagnoses as of 06/21/24 1503   Syncope, unspecified syncope type   Hypomagnesemia       Medical Decision Making  =================Attending note===============    The patient was seen by the resident/fellow.  I have personally performed a substantive portion of the encounter.  I have seen and examined the  patient; agree with the workup, evaluation, MDM,   management and diagnosis.  The care plan has been discussed with the resident; I have reviewed the resident's note and agree with the documented findings.      This is a 84 y.o. female who presents to ER after a syncopal episode.  She was at the bank to transfer money to her checking account so she can get her roof replaced and the bank person was questioning whether she should transfer that amount of money into her checking account she was upset.  She states she was sitting there and then she passed out.  Is very brief episode of syncope.  She was lightheaded right before she passed out.  She been feeling fine otherwise.  She states she only ate a small breakfast sandwich for breakfast this morning.  She is been eating and drinking well otherwise.  No nausea or vomiting or diarrhea.  No urinary issues.  There is mild calf swelling.  No blood thinners.  She does have hypothyroid.  She does have CHF.  Heart is regular.  Lungs are clear.  Abdomen is soft and nontender.  No distress.  She is awake and alert and oriented.  She was sitting when she passed out.  No injuries.    EKG: Sinus bradycardia with a ventricular rate of 54.  .  QTc 443.  No ST changes.    TSH is mildly low at 0.24.  TSH is mildly elevated at 1.38.  Troponin is negative.  BNP is normal.  Chemistry shows some renal insufficiency.  GFR is 27.  Magnesium is minimally low at 1.5.  D-dimer is elevated.  There is renal insufficiency so V/Q was ordered.  No leukocytosis.  No significant anemia with a hemoglobin of 10.9.  Chest x-ray has no acute findings.  Venous duplex is negative.                  ==========================================          Procedure  Procedures     Leann Sanchez DO  Resident  06/21/24 6985

## 2024-06-22 ENCOUNTER — APPOINTMENT (OUTPATIENT)
Dept: CARDIOLOGY | Facility: HOSPITAL | Age: 84
End: 2024-06-22
Payer: MEDICARE

## 2024-06-22 VITALS
DIASTOLIC BLOOD PRESSURE: 60 MMHG | SYSTOLIC BLOOD PRESSURE: 130 MMHG | RESPIRATION RATE: 18 BRPM | BODY MASS INDEX: 34.71 KG/M2 | OXYGEN SATURATION: 99 % | HEART RATE: 63 BPM | WEIGHT: 176.81 LBS | HEIGHT: 60 IN | TEMPERATURE: 97.3 F

## 2024-06-22 LAB
ANION GAP SERPL CALC-SCNC: 10 MMOL/L (ref 10–20)
BUN SERPL-MCNC: 30 MG/DL (ref 6–23)
CALCIUM SERPL-MCNC: 8.5 MG/DL (ref 8.6–10.3)
CHLORIDE SERPL-SCNC: 113 MMOL/L (ref 98–107)
CO2 SERPL-SCNC: 23 MMOL/L (ref 21–32)
CREAT SERPL-MCNC: 1.38 MG/DL (ref 0.5–1.05)
EGFRCR SERPLBLD CKD-EPI 2021: 38 ML/MIN/1.73M*2
ERYTHROCYTE [DISTWIDTH] IN BLOOD BY AUTOMATED COUNT: 14.5 % (ref 11.5–14.5)
GLUCOSE SERPL-MCNC: 84 MG/DL (ref 74–99)
HCT VFR BLD AUTO: 33.1 % (ref 36–46)
HGB BLD-MCNC: 10.3 G/DL (ref 12–16)
MAGNESIUM SERPL-MCNC: 1.95 MG/DL (ref 1.6–2.4)
MCH RBC QN AUTO: 29.4 PG (ref 26–34)
MCHC RBC AUTO-ENTMCNC: 31.1 G/DL (ref 32–36)
MCV RBC AUTO: 95 FL (ref 80–100)
NRBC BLD-RTO: 0 /100 WBCS (ref 0–0)
PLATELET # BLD AUTO: 133 X10*3/UL (ref 150–450)
POTASSIUM SERPL-SCNC: 4.3 MMOL/L (ref 3.5–5.3)
RBC # BLD AUTO: 3.5 X10*6/UL (ref 4–5.2)
SODIUM SERPL-SCNC: 142 MMOL/L (ref 136–145)
WBC # BLD AUTO: 5.6 X10*3/UL (ref 4.4–11.3)

## 2024-06-22 PROCEDURE — G0378 HOSPITAL OBSERVATION PER HR: HCPCS

## 2024-06-22 PROCEDURE — 2500000004 HC RX 250 GENERAL PHARMACY W/ HCPCS (ALT 636 FOR OP/ED): Performed by: NURSE PRACTITIONER

## 2024-06-22 PROCEDURE — 96372 THER/PROPH/DIAG INJ SC/IM: CPT | Performed by: NURSE PRACTITIONER

## 2024-06-22 PROCEDURE — 83735 ASSAY OF MAGNESIUM: CPT | Performed by: NURSE PRACTITIONER

## 2024-06-22 PROCEDURE — 85027 COMPLETE CBC AUTOMATED: CPT | Performed by: NURSE PRACTITIONER

## 2024-06-22 PROCEDURE — 2500000001 HC RX 250 WO HCPCS SELF ADMINISTERED DRUGS (ALT 637 FOR MEDICARE OP): Performed by: STUDENT IN AN ORGANIZED HEALTH CARE EDUCATION/TRAINING PROGRAM

## 2024-06-22 PROCEDURE — 99239 HOSP IP/OBS DSCHRG MGMT >30: CPT | Performed by: STUDENT IN AN ORGANIZED HEALTH CARE EDUCATION/TRAINING PROGRAM

## 2024-06-22 PROCEDURE — 2500000002 HC RX 250 W HCPCS SELF ADMINISTERED DRUGS (ALT 637 FOR MEDICARE OP, ALT 636 FOR OP/ED): Performed by: STUDENT IN AN ORGANIZED HEALTH CARE EDUCATION/TRAINING PROGRAM

## 2024-06-22 PROCEDURE — 36415 COLL VENOUS BLD VENIPUNCTURE: CPT | Performed by: NURSE PRACTITIONER

## 2024-06-22 PROCEDURE — 80048 BASIC METABOLIC PNL TOTAL CA: CPT | Performed by: NURSE PRACTITIONER

## 2024-06-22 ASSESSMENT — PAIN SCALES - GENERAL: PAINLEVEL_OUTOF10: 0 - NO PAIN

## 2024-06-22 NOTE — DISCHARGE SUMMARY
Discharge Diagnosis  Syncope    Issues Requiring Follow-Up  Follow up with pcp    Test Results Pending At Discharge  Pending Labs       No current pending labs.            Hospital Course   Lyndsey Rosado is a 84 y.o. female presents to [facility] ER with chief complaint of witnessed syncopal episode. Patient was at the bank today with a friend. Patient was upset with . Friend observed patient have brief 20 second episode of syncope. She had no symptoms prior to event. When EMS arrived, they reported to ER they believe they saw a brief episode of atrial fib/atrial flutter on the cardiac monitor, ER reports patient arrived in SR.   denies symptoms currently.  No preceding chest pain, shortness of breath, palpitations, dizziness, lightheadedness, headaches, vision changes.  EMS report she had a short run of atrial flutter on their rhythm strip.  No diagnose history of atrial fibrillation and she is not anticoagulated. Patient centers more around the episode with the  and does not devote much history to events of her syncope. She does state she had no symptoms prior to her friend telling her she passed out and does not recollect event. She states she is still mad and maybe when she settles down. Currently has no complaints     Hospital course  Over the course of hospitalization, patient remained clinically stable.  Upon further questioning, patient states she was extremely upset with the banker due to the event that transpired at the bank, however she never syncopized, and remembers all the details very clearly.  No acute event observed overnight.  Labs did not show any acute findings.  Discussed with the patient and son Doni in great details, will hold off on further workup at this time since patient has remained clinically stable, however recommended for close monitoring over the next few days, if similar event were to recur, patient should return to the emergency room  immediately, patient and family expressed understanding.  Patient discharged in stable condition.    I have spent > 30min discharging the pt, care time spent include discharge planning, medication reconciliation and discussion of discharge instruction/follow up with the patient.        Pertinent Physical Exam At Time of Discharge  Constitutional: Well developed, awake/alert/oriented x3, no distress, alert and cooperative  Eyes: PERRL, EOMI, clear sclera  ENMT: mucous membranes moist  Head/Neck: Neck supple  Respiratory/Thorax: CTA b/l.   Cardiovascular: Regular, rate and rhythm, no murmurs  Gastrointestinal: Nondistended, soft, non-tender  Musculoskeletal: ROM intact  Extremities: normal extremities      Home Medications     Medication List      CONTINUE taking these medications     atorvastatin 10 mg tablet; Commonly known as: Lipitor   calcium carbonate-vitamin D3 600 mg-20 mcg (800 unit) tablet,chewable   levothyroxine 88 mcg capsule; Commonly known as: Tirosint   olmesartan 20 mg tablet; Commonly known as: BENIcar   pioglitazone 15 mg tablet; Commonly known as: Actos       Outpatient Follow-Up  No future appointments.    Fred Luu MD

## 2024-06-22 NOTE — CARE PLAN
The patient's goals for the shift include      The clinical goals for the shift include remain free from falls    Safety maintained no falls this shift.      Problem: Fall/Injury  Goal: Not fall by end of shift  Outcome: Progressing  Goal: Be free from injury by end of the shift  Outcome: Progressing  Goal: Verbalize understanding of personal risk factors for fall in the hospital  Outcome: Progressing  Goal: Verbalize understanding of risk factor reduction measures to prevent injury from fall in the home  Outcome: Progressing  Goal: Use assistive devices by end of the shift  Outcome: Progressing  Goal: Pace activities to prevent fatigue by end of the shift  Outcome: Progressing

## 2024-06-22 NOTE — NURSING NOTE
2100   Pt refused lipitor at this time. States takes in am on empty stomach with thyroid med in am .

## 2024-06-22 NOTE — DISCHARGE INSTRUCTIONS
#it was my pleasure taking care of you during this hospitalization    You were admitted to the hospital due to a reported event of syncope.  Since admission, you have remained clinically stable without any further observed event.  Per your report, you never actually syncopized while at the bank, but was mostly upset due to the interaction with the banker.  Since you have remained very stable since admission, as discussed with your son Doni, will hold off on extensive workup.  Please have someone stay with you at all times over the next few days, if this event were to happen again, please come back to the emergency room immediately.

## 2024-06-23 LAB
ATRIAL RATE: 110 BPM
ATRIAL RATE: 54 BPM
P AXIS: 67 DEGREES
P AXIS: 75 DEGREES
P OFFSET: 192 MS
P OFFSET: 193 MS
P ONSET: 132 MS
P ONSET: 133 MS
PR INTERVAL: 170 MS
PR INTERVAL: 172 MS
Q ONSET: 218 MS
Q ONSET: 218 MS
QRS COUNT: 9 BEATS
QRS COUNT: 9 BEATS
QRS DURATION: 118 MS
QRS DURATION: 120 MS
QT INTERVAL: 468 MS
QT INTERVAL: 488 MS
QTC CALCULATION(BAZETT): 443 MS
QTC CALCULATION(BAZETT): 470 MS
QTC FREDERICIA: 451 MS
QTC FREDERICIA: 477 MS
R AXIS: 42 DEGREES
R AXIS: 42 DEGREES
T AXIS: 17 DEGREES
T AXIS: 39 DEGREES
T OFFSET: 452 MS
T OFFSET: 462 MS
VENTRICULAR RATE: 54 BPM
VENTRICULAR RATE: 56 BPM

## 2024-12-08 ENCOUNTER — APPOINTMENT (OUTPATIENT)
Dept: RADIOLOGY | Facility: HOSPITAL | Age: 84
End: 2024-12-08
Payer: MEDICARE

## 2024-12-08 ENCOUNTER — HOSPITAL ENCOUNTER (OUTPATIENT)
Facility: HOSPITAL | Age: 84
Setting detail: OBSERVATION
Discharge: HOME HEALTH | End: 2024-12-10
Attending: STUDENT IN AN ORGANIZED HEALTH CARE EDUCATION/TRAINING PROGRAM | Admitting: INTERNAL MEDICINE
Payer: MEDICARE

## 2024-12-08 ENCOUNTER — APPOINTMENT (OUTPATIENT)
Dept: CARDIOLOGY | Facility: HOSPITAL | Age: 84
End: 2024-12-08
Payer: MEDICARE

## 2024-12-08 DIAGNOSIS — S02.2XXA CLOSED FRACTURE OF NASAL BONE, INITIAL ENCOUNTER: Primary | ICD-10-CM
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
## 2024-12-08 PROBLEM — R29.6 FALL IN ELDERLY PATIENT: Status: ACTIVE | Noted: 2024-12-08

## 2024-12-08 LAB
ALBUMIN SERPL BCP-MCNC: 3.5 G/DL (ref 3.4–5)
ALP SERPL-CCNC: 40 U/L (ref 33–136)
ALT SERPL W P-5'-P-CCNC: 8 U/L (ref 7–45)
ANION GAP SERPL CALC-SCNC: 15 MMOL/L (ref 10–20)
AST SERPL W P-5'-P-CCNC: 17 U/L (ref 9–39)
BASOPHILS # BLD AUTO: 0.02 X10*3/UL (ref 0–0.1)
BASOPHILS NFR BLD AUTO: 0.2 %
BILIRUB SERPL-MCNC: 0.5 MG/DL (ref 0–1.2)
BUN SERPL-MCNC: 37 MG/DL (ref 6–23)
CALCIUM SERPL-MCNC: 9.2 MG/DL (ref 8.6–10.3)
CARDIAC TROPONIN I PNL SERPL HS: 5 NG/L (ref 0–13)
CHLORIDE SERPL-SCNC: 109 MMOL/L (ref 98–107)
CO2 SERPL-SCNC: 20 MMOL/L (ref 21–32)
CREAT SERPL-MCNC: 1.81 MG/DL (ref 0.5–1.05)
EGFRCR SERPLBLD CKD-EPI 2021: 27 ML/MIN/1.73M*2
EOSINOPHIL # BLD AUTO: 0.01 X10*3/UL (ref 0–0.4)
EOSINOPHIL NFR BLD AUTO: 0.1 %
ERYTHROCYTE [DISTWIDTH] IN BLOOD BY AUTOMATED COUNT: 13.4 % (ref 11.5–14.5)
GLUCOSE SERPL-MCNC: 107 MG/DL (ref 74–99)
HCT VFR BLD AUTO: 34.5 % (ref 36–46)
HGB BLD-MCNC: 10.6 G/DL (ref 12–16)
IMM GRANULOCYTES # BLD AUTO: 0.04 X10*3/UL (ref 0–0.5)
IMM GRANULOCYTES NFR BLD AUTO: 0.4 % (ref 0–0.9)
LYMPHOCYTES # BLD AUTO: 1.08 X10*3/UL (ref 0.8–3)
LYMPHOCYTES NFR BLD AUTO: 11.6 %
MCH RBC QN AUTO: 29.9 PG (ref 26–34)
MCHC RBC AUTO-ENTMCNC: 30.7 G/DL (ref 32–36)
MCV RBC AUTO: 98 FL (ref 80–100)
MONOCYTES # BLD AUTO: 0.61 X10*3/UL (ref 0.05–0.8)
MONOCYTES NFR BLD AUTO: 6.6 %
NEUTROPHILS # BLD AUTO: 7.55 X10*3/UL (ref 1.6–5.5)
NEUTROPHILS NFR BLD AUTO: 81.1 %
NRBC BLD-RTO: 0 /100 WBCS (ref 0–0)
PLATELET # BLD AUTO: 141 X10*3/UL (ref 150–450)
POTASSIUM SERPL-SCNC: 4.7 MMOL/L (ref 3.5–5.3)
PROT SERPL-MCNC: 6.5 G/DL (ref 6.4–8.2)
RBC # BLD AUTO: 3.54 X10*6/UL (ref 4–5.2)
SODIUM SERPL-SCNC: 139 MMOL/L (ref 136–145)
WBC # BLD AUTO: 9.3 X10*3/UL (ref 4.4–11.3)

## 2024-12-08 PROCEDURE — 76377 3D RENDER W/INTRP POSTPROCES: CPT | Performed by: STUDENT IN AN ORGANIZED HEALTH CARE EDUCATION/TRAINING PROGRAM

## 2024-12-08 PROCEDURE — 71045 X-RAY EXAM CHEST 1 VIEW: CPT | Performed by: STUDENT IN AN ORGANIZED HEALTH CARE EDUCATION/TRAINING PROGRAM

## 2024-12-08 PROCEDURE — 73080 X-RAY EXAM OF ELBOW: CPT | Mod: RT

## 2024-12-08 PROCEDURE — 70450 CT HEAD/BRAIN W/O DYE: CPT

## 2024-12-08 PROCEDURE — 96360 HYDRATION IV INFUSION INIT: CPT

## 2024-12-08 PROCEDURE — 36415 COLL VENOUS BLD VENIPUNCTURE: CPT

## 2024-12-08 PROCEDURE — 99291 CRITICAL CARE FIRST HOUR: CPT | Mod: 25 | Performed by: STUDENT IN AN ORGANIZED HEALTH CARE EDUCATION/TRAINING PROGRAM

## 2024-12-08 PROCEDURE — 2500000004 HC RX 250 GENERAL PHARMACY W/ HCPCS (ALT 636 FOR OP/ED)

## 2024-12-08 PROCEDURE — 99223 1ST HOSP IP/OBS HIGH 75: CPT | Performed by: INTERNAL MEDICINE

## 2024-12-08 PROCEDURE — 90715 TDAP VACCINE 7 YRS/> IM: CPT

## 2024-12-08 PROCEDURE — 84484 ASSAY OF TROPONIN QUANT: CPT

## 2024-12-08 PROCEDURE — 96361 HYDRATE IV INFUSION ADD-ON: CPT

## 2024-12-08 PROCEDURE — 80053 COMPREHEN METABOLIC PANEL: CPT

## 2024-12-08 PROCEDURE — 70486 CT MAXILLOFACIAL W/O DYE: CPT | Performed by: STUDENT IN AN ORGANIZED HEALTH CARE EDUCATION/TRAINING PROGRAM

## 2024-12-08 PROCEDURE — 99285 EMERGENCY DEPT VISIT HI MDM: CPT | Mod: 25 | Performed by: STUDENT IN AN ORGANIZED HEALTH CARE EDUCATION/TRAINING PROGRAM

## 2024-12-08 PROCEDURE — G0378 HOSPITAL OBSERVATION PER HR: HCPCS

## 2024-12-08 PROCEDURE — 72192 CT PELVIS W/O DYE: CPT | Performed by: STUDENT IN AN ORGANIZED HEALTH CARE EDUCATION/TRAINING PROGRAM

## 2024-12-08 PROCEDURE — 70486 CT MAXILLOFACIAL W/O DYE: CPT

## 2024-12-08 PROCEDURE — 72192 CT PELVIS W/O DYE: CPT

## 2024-12-08 PROCEDURE — 71045 X-RAY EXAM CHEST 1 VIEW: CPT

## 2024-12-08 PROCEDURE — 73080 X-RAY EXAM OF ELBOW: CPT | Mod: RIGHT SIDE | Performed by: RADIOLOGY

## 2024-12-08 PROCEDURE — 70450 CT HEAD/BRAIN W/O DYE: CPT | Performed by: STUDENT IN AN ORGANIZED HEALTH CARE EDUCATION/TRAINING PROGRAM

## 2024-12-08 PROCEDURE — 85025 COMPLETE CBC W/AUTO DIFF WBC: CPT

## 2024-12-08 PROCEDURE — 76377 3D RENDER W/INTRP POSTPROCES: CPT

## 2024-12-08 PROCEDURE — 90471 IMMUNIZATION ADMIN: CPT

## 2024-12-08 PROCEDURE — 72125 CT NECK SPINE W/O DYE: CPT | Performed by: STUDENT IN AN ORGANIZED HEALTH CARE EDUCATION/TRAINING PROGRAM

## 2024-12-08 PROCEDURE — 72125 CT NECK SPINE W/O DYE: CPT

## 2024-12-08 PROCEDURE — 93005 ELECTROCARDIOGRAM TRACING: CPT

## 2024-12-08 PROCEDURE — 2500000001 HC RX 250 WO HCPCS SELF ADMINISTERED DRUGS (ALT 637 FOR MEDICARE OP)

## 2024-12-08 RX ORDER — PANTOPRAZOLE SODIUM 40 MG/1
40 TABLET, DELAYED RELEASE ORAL
Status: DISCONTINUED | OUTPATIENT
Start: 2024-12-09 | End: 2024-12-10 | Stop reason: HOSPADM

## 2024-12-08 RX ORDER — ACETAMINOPHEN 325 MG/1
650 TABLET ORAL ONCE
Status: DISCONTINUED | OUTPATIENT
Start: 2024-12-08 | End: 2024-12-10 | Stop reason: HOSPADM

## 2024-12-08 RX ORDER — PIOGLITAZONEHYDROCHLORIDE 15 MG/1
15 TABLET ORAL DAILY
Status: DISCONTINUED | OUTPATIENT
Start: 2024-12-09 | End: 2024-12-10 | Stop reason: HOSPADM

## 2024-12-08 RX ORDER — METOCLOPRAMIDE 10 MG/1
5 TABLET ORAL EVERY 6 HOURS PRN
Status: DISCONTINUED | OUTPATIENT
Start: 2024-12-08 | End: 2024-12-10 | Stop reason: HOSPADM

## 2024-12-08 RX ORDER — METOCLOPRAMIDE HYDROCHLORIDE 5 MG/ML
5 INJECTION INTRAMUSCULAR; INTRAVENOUS EVERY 6 HOURS PRN
Status: DISCONTINUED | OUTPATIENT
Start: 2024-12-08 | End: 2024-12-10 | Stop reason: HOSPADM

## 2024-12-08 RX ORDER — ONDANSETRON 4 MG/1
4 TABLET, ORALLY DISINTEGRATING ORAL EVERY 8 HOURS PRN
Status: DISCONTINUED | OUTPATIENT
Start: 2024-12-08 | End: 2024-12-10 | Stop reason: HOSPADM

## 2024-12-08 RX ORDER — ACETAMINOPHEN 325 MG/1
650 TABLET ORAL EVERY 4 HOURS PRN
Status: DISCONTINUED | OUTPATIENT
Start: 2024-12-08 | End: 2024-12-10 | Stop reason: HOSPADM

## 2024-12-08 RX ORDER — ATORVASTATIN CALCIUM 10 MG/1
10 TABLET, FILM COATED ORAL DAILY
Status: DISCONTINUED | OUTPATIENT
Start: 2024-12-09 | End: 2024-12-10 | Stop reason: HOSPADM

## 2024-12-08 RX ORDER — ACETAMINOPHEN 160 MG/5ML
650 SOLUTION ORAL EVERY 4 HOURS PRN
Status: DISCONTINUED | OUTPATIENT
Start: 2024-12-08 | End: 2024-12-10 | Stop reason: HOSPADM

## 2024-12-08 RX ORDER — ONDANSETRON HYDROCHLORIDE 2 MG/ML
4 INJECTION, SOLUTION INTRAVENOUS EVERY 8 HOURS PRN
Status: DISCONTINUED | OUTPATIENT
Start: 2024-12-08 | End: 2024-12-10 | Stop reason: HOSPADM

## 2024-12-08 RX ORDER — TALC
3 POWDER (GRAM) TOPICAL NIGHTLY PRN
Status: DISCONTINUED | OUTPATIENT
Start: 2024-12-08 | End: 2024-12-10 | Stop reason: HOSPADM

## 2024-12-08 RX ORDER — ACETAMINOPHEN 650 MG/1
650 SUPPOSITORY RECTAL EVERY 4 HOURS PRN
Status: DISCONTINUED | OUTPATIENT
Start: 2024-12-08 | End: 2024-12-10 | Stop reason: HOSPADM

## 2024-12-08 RX ORDER — PANTOPRAZOLE SODIUM 40 MG/10ML
40 INJECTION, POWDER, LYOPHILIZED, FOR SOLUTION INTRAVENOUS
Status: DISCONTINUED | OUTPATIENT
Start: 2024-12-09 | End: 2024-12-10 | Stop reason: HOSPADM

## 2024-12-08 SDOH — SOCIAL STABILITY: SOCIAL INSECURITY: ARE THERE ANY APPARENT SIGNS OF INJURIES/BEHAVIORS THAT COULD BE RELATED TO ABUSE/NEGLECT?: NO

## 2024-12-08 SDOH — SOCIAL STABILITY: SOCIAL INSECURITY: WERE YOU ABLE TO COMPLETE ALL THE BEHAVIORAL HEALTH SCREENINGS?: YES

## 2024-12-08 SDOH — SOCIAL STABILITY: SOCIAL INSECURITY: DOES ANYONE TRY TO KEEP YOU FROM HAVING/CONTACTING OTHER FRIENDS OR DOING THINGS OUTSIDE YOUR HOME?: NO

## 2024-12-08 SDOH — SOCIAL STABILITY: SOCIAL INSECURITY: DO YOU FEEL UNSAFE GOING BACK TO THE PLACE WHERE YOU ARE LIVING?: NO

## 2024-12-08 SDOH — SOCIAL STABILITY: SOCIAL INSECURITY: ARE YOU OR HAVE YOU BEEN THREATENED OR ABUSED PHYSICALLY, EMOTIONALLY, OR SEXUALLY BY ANYONE?: NO

## 2024-12-08 SDOH — SOCIAL STABILITY: SOCIAL INSECURITY: HAVE YOU HAD ANY THOUGHTS OF HARMING ANYONE ELSE?: NO

## 2024-12-08 SDOH — SOCIAL STABILITY: SOCIAL INSECURITY: ABUSE: ADULT

## 2024-12-08 SDOH — SOCIAL STABILITY: SOCIAL INSECURITY: HAVE YOU HAD THOUGHTS OF HARMING ANYONE ELSE?: NO

## 2024-12-08 SDOH — SOCIAL STABILITY: SOCIAL INSECURITY: HAS ANYONE EVER THREATENED TO HURT YOUR FAMILY OR YOUR PETS?: NO

## 2024-12-08 SDOH — SOCIAL STABILITY: SOCIAL INSECURITY: DO YOU FEEL ANYONE HAS EXPLOITED OR TAKEN ADVANTAGE OF YOU FINANCIALLY OR OF YOUR PERSONAL PROPERTY?: NO

## 2024-12-08 ASSESSMENT — ACTIVITIES OF DAILY LIVING (ADL)
LACK_OF_TRANSPORTATION: NO
PATIENT'S MEMORY ADEQUATE TO SAFELY COMPLETE DAILY ACTIVITIES?: YES
DRESSING YOURSELF: INDEPENDENT
ADEQUATE_TO_COMPLETE_ADL: YES
HEARING - RIGHT EAR: DIFFICULTY WITH NOISE
GROOMING: INDEPENDENT
FEEDING YOURSELF: NEEDS ASSISTANCE
WALKS IN HOME: INDEPENDENT
JUDGMENT_ADEQUATE_SAFELY_COMPLETE_DAILY_ACTIVITIES: YES
TOILETING: INDEPENDENT
HEARING - LEFT EAR: DIFFICULTY WITH NOISE
BATHING: NEEDS ASSISTANCE

## 2024-12-08 ASSESSMENT — COGNITIVE AND FUNCTIONAL STATUS - GENERAL
PATIENT BASELINE BEDBOUND: NO
MOBILITY SCORE: 20
DAILY ACTIVITIY SCORE: 20
PERSONAL GROOMING: A LITTLE
WALKING IN HOSPITAL ROOM: A LITTLE
EATING MEALS: A LITTLE
STANDING UP FROM CHAIR USING ARMS: A LITTLE
HELP NEEDED FOR BATHING: A LITTLE
CLIMB 3 TO 5 STEPS WITH RAILING: A LOT
TOILETING: A LITTLE

## 2024-12-08 ASSESSMENT — LIFESTYLE VARIABLES
PRESCIPTION_ABUSE_PAST_12_MONTHS: NO
AUDIT-C TOTAL SCORE: 0
HOW MANY STANDARD DRINKS CONTAINING ALCOHOL DO YOU HAVE ON A TYPICAL DAY: PATIENT DOES NOT DRINK
EVER FELT BAD OR GUILTY ABOUT YOUR DRINKING: NO
TOTAL SCORE: 0
HOW OFTEN DO YOU HAVE 6 OR MORE DRINKS ON ONE OCCASION: NEVER
SKIP TO QUESTIONS 9-10: 1
AUDIT-C TOTAL SCORE: 0
HAVE YOU EVER FELT YOU SHOULD CUT DOWN ON YOUR DRINKING: NO
EVER HAD A DRINK FIRST THING IN THE MORNING TO STEADY YOUR NERVES TO GET RID OF A HANGOVER: NO
HAVE PEOPLE ANNOYED YOU BY CRITICIZING YOUR DRINKING: NO
HOW OFTEN DO YOU HAVE A DRINK CONTAINING ALCOHOL: NEVER
SUBSTANCE_ABUSE_PAST_12_MONTHS: NO

## 2024-12-08 ASSESSMENT — PAIN SCALES - GENERAL
PAINLEVEL_OUTOF10: 0 - NO PAIN
PAINLEVEL_OUTOF10: 0 - NO PAIN
PAINLEVEL_OUTOF10: 2

## 2024-12-08 ASSESSMENT — PAIN - FUNCTIONAL ASSESSMENT
PAIN_FUNCTIONAL_ASSESSMENT: 0-10
PAIN_FUNCTIONAL_ASSESSMENT: 0-10

## 2024-12-08 ASSESSMENT — PATIENT HEALTH QUESTIONNAIRE - PHQ9
1. LITTLE INTEREST OR PLEASURE IN DOING THINGS: NOT AT ALL
2. FEELING DOWN, DEPRESSED OR HOPELESS: NOT AT ALL
SUM OF ALL RESPONSES TO PHQ9 QUESTIONS 1 & 2: 0

## 2024-12-08 ASSESSMENT — COLUMBIA-SUICIDE SEVERITY RATING SCALE - C-SSRS
6. HAVE YOU EVER DONE ANYTHING, STARTED TO DO ANYTHING, OR PREPARED TO DO ANYTHING TO END YOUR LIFE?: NO
2. HAVE YOU ACTUALLY HAD ANY THOUGHTS OF KILLING YOURSELF?: NO
1. IN THE PAST MONTH, HAVE YOU WISHED YOU WERE DEAD OR WISHED YOU COULD GO TO SLEEP AND NOT WAKE UP?: NO

## 2024-12-08 NOTE — ED PROVIDER NOTES
EMERGENCY DEPARTMENT ENCOUNTER      Pt Name: Lyndsey Rosado  MRN: [id number]  Birthdate 1940  Date of evaluation: 12/8/2024  Provider: Radu Franklin DO    CHIEF COMPLAINT       Chief Complaint   Patient presents with    Fall     Fall at noon. Pt denies hitting head, denies LOC and blood thinners. Denies dizziness. Pt complains of no pain. Small skin tear to right arm.          HISTORY OF PRESENT ILLNESS    84-year-old female comes emergency room for a fall patient that she was in the kitchen, tripped over the floor.  Denies any headache, lightheadedness, chest pain, shortness of breath prior to the fall.  She is not on blood thinners, said she landed on her backside around her right elbow.  Did not hit her head she states, is not on blood thinners, no other symptoms or concerns today.      History provided by:  Patient      Nursing Notes were reviewed.    PAST MEDICAL HISTORY     Past Medical History:   Diagnosis Date    [place] (hard of hearing)     Hypothyroid          SURGICAL HISTORY     No past surgical history on file.      CURRENT MEDICATIONS       Previous Medications    ATORVASTATIN (LIPITOR) 10 MG TABLET    Take 1 tablet (10 mg) by mouth once daily.    CALCIUM CARBONATE-VITAMIN D3 600 MG-20 MCG (800 UNIT) TABLET,CHEWABLE    Chew 1 tablet once daily.    LEVOTHYROXINE (TIROSINT) 88 MCG CAPSULE    Take 1 capsule (88 mcg) by mouth once daily in the morning. Take before meals.    OLMESARTAN (BENICAR) 20 MG TABLET    Take 1 tablet (20 mg) by mouth once daily.    PIOGLITAZONE (ACTOS) 15 MG TABLET    Take 1 tablet (15 mg) by mouth once daily.       ALLERGIES     Penicillin g and Penicillins    FAMILY HISTORY     No family history on file.       SOCIAL HISTORY       Social History     Socioeconomic History    Marital status:    Tobacco Use    Smoking status: Never    Smokeless tobacco: Never   Substance and Sexual Activity    Alcohol use: Never    Sexual activity: Defer     Social Drivers of Health      Financial Resource Strain: Patient Declined (6/21/2024)    Overall Financial Resource Strain (CARDIA)     Difficulty of Paying Living Expenses: Patient declined   Transportation Needs: Patient Declined (6/21/2024)    PRAPARE - Transportation     Lack of Transportation (Medical): Patient declined     Lack of Transportation (Non-Medical): Patient declined   Housing Stability: Patient Declined (6/21/2024)    Housing Stability Vital Sign     Unable to Pay for Housing in the Last Year: Patient declined     Number of Places Lived in the Last Year: 1     Unstable Housing in the Last Year: Patient declined       SCREENINGS                        PHYSICAL EXAM    (up to 7 for level 4, 8 or more for level 5)     ED Triage Vitals [12/08/24 1339]   Temperature Heart Rate Respirations BP   37.3 °C (99.1 °F) 74 18 143/57      Pulse Ox Temp Source Heart Rate Source Patient Position   100 % Temporal Monitor Sitting      BP Location FiO2 (%)     Right arm --       Physical Exam  Constitutional:       Appearance: Normal appearance.   HENT:      Head: Normocephalic and atraumatic.      Nose: No congestion or rhinorrhea.      Comments: There is ecchymosis and tenderness over the nasal bridge  Eyes:      General:         Right eye: No discharge.         Left eye: No discharge.      Conjunctiva/sclera: Conjunctivae normal.      Pupils: Pupils are equal, round, and reactive to light.   Neck:      Comments: No midline tenderness, deformities,  Cardiovascular:      Rate and Rhythm: Normal rate and regular rhythm.   Pulmonary:      Effort: Pulmonary effort is normal.      Comments: No anterior chest wall tenderness  Abdominal:      General: Abdomen is flat. There is no distension.      Palpations: Abdomen is soft.      Tenderness: There is no abdominal tenderness. There is no guarding or rebound.   Musculoskeletal:      Cervical back: Normal range of motion.      Comments: No midline tenderness of the T or L-spine, no deformities or  step-offs, no lateral flank tenderness and ecchymosis, no paraspinal tenderness.  No pain on internal and external rotation of either hip, no tenderness over the lower extremities, upper extremities aside from a skin tear on the right elbow and tenderness overlying the skin tear.  She has full range of motion of bilateral elbows, hands, shoulders.   Skin:     General: Skin is warm and dry.   Neurological:      Mental Status: She is alert. Mental status is at baseline.          DIAGNOSTIC RESULTS     LABS:  Labs Reviewed   CBC WITH AUTO DIFFERENTIAL - Abnormal       Result Value    WBC 9.3      nRBC 0.0      RBC 3.54 (*)     Hemoglobin 10.6 (*)     Hematocrit 34.5 (*)     MCV 98      MCH 29.9      MCHC 30.7 (*)     RDW 13.4      Platelets 141 (*)     Neutrophils % 81.1      Immature Granulocytes %, Automated 0.4      Lymphocytes % 11.6      Monocytes % 6.6      Eosinophils % 0.1      Basophils % 0.2      Neutrophils Absolute 7.55 (*)     Immature Granulocytes Absolute, Automated 0.04      Lymphocytes Absolute 1.08      Monocytes Absolute 0.61      Eosinophils Absolute 0.01      Basophils Absolute 0.02     COMPREHENSIVE METABOLIC PANEL - Abnormal    Glucose 107 (*)     Sodium 139      Potassium 4.7      Chloride 109 (*)     Bicarbonate 20 (*)     Anion Gap 15      Urea Nitrogen 37 (*)     Creatinine 1.81 (*)     eGFR 27 (*)     Calcium 9.2      Albumin 3.5      Alkaline Phosphatase 40      Total Protein 6.5      AST 17      Bilirubin, Total 0.5      ALT 8     TROPONIN I, HIGH SENSITIVITY - Normal    Troponin I, High Sensitivity 5      Narrative:     Less than 99th percentile of normal range cutoff-  Female and children under 18 years old <14 ng/L; Male <21 ng/L: Negative  Repeat testing should be performed if clinically indicated.     Female and children under 18 years old 14-50 ng/L; Male 21-50 ng/L:  Consistent with possible cardiac damage and possible increased clinical   risk. Serial measurements may help to  assess extent of myocardial damage.     >50 ng/L: Consistent with cardiac damage, increased clinical risk and  myocardial infarction. Serial measurements may help assess extent of   myocardial damage.      NOTE: Children less than 1 year old may have higher baseline troponin   levels and results should be interpreted in conjunction with the overall   clinical context.     NOTE: Troponin I testing is performed using a different   testing methodology at [facility] than at other   [facility]. Direct result comparisons should only   be made within the same method.       All other labs were within normal range or not returned as of this dictation.    Imaging  XR chest 1 view   Final Result   No acute pulmonary process.        MACRO   None        Signed by: Geneva Salcedo 12/8/2024 5:16 PM   Dictation workstation:   [id number]      CT head wo IV contrast   Final Result   No acute intracranial abnormality. Moderate generalized central   cerebral volume loss. Minimally displaced fractures of the bilateral   nasal bones with soft tissue swelling overlying the distal right   nasal bone. New fluid within the right mastoid air cells and right   middle ear cavity since August, 2023. Clinical correlation for middle   ear infection or otomastoiditis recommended. No evidence of   additional facial bone fracture. No cervical spine fracture.        Signed by: Jorgito Cartagena 12/8/2024 4:52 PM   Dictation workstation:   [id number]      CT cervical spine wo IV contrast   Final Result   No acute intracranial abnormality. Moderate generalized central   cerebral volume loss. Minimally displaced fractures of the bilateral   nasal bones with soft tissue swelling overlying the distal right   nasal bone. New fluid within the right mastoid air cells and right   middle ear cavity since August, 2023. Clinical correlation for middle   ear infection or otomastoiditis recommended. No evidence of   additional facial bone  fracture. No cervical spine fracture.        Signed by: Jorgito Cartagena 12/8/2024 4:52 PM   Dictation workstation:   [id number]      CT maxillofacial bones wo IV contrast   Final Result   No acute intracranial abnormality. Moderate generalized central   cerebral volume loss. Minimally displaced fractures of the bilateral   nasal bones with soft tissue swelling overlying the distal right   nasal bone. New fluid within the right mastoid air cells and right   middle ear cavity since August, 2023. Clinical correlation for middle   ear infection or otomastoiditis recommended. No evidence of   additional facial bone fracture. No cervical spine fracture.        Signed by: Jorgito Cartagena 12/8/2024 4:52 PM   Dictation workstation:   [id number]      CT pelvis wo IV contrast   Final Result   1.  No fracture is identified. There is osteopenia which limits   evaluation for nondisplaced fractures.        MACRO:   None        Signed by: Jorgito Cartagena 12/8/2024 5:04 PM   Dictation workstation:   [id number]      CT 3D reconstruction   Final Result   No acute intracranial abnormality. Moderate generalized central   cerebral volume loss. Minimally displaced fractures of the bilateral   nasal bones with soft tissue swelling overlying the distal right   nasal bone. New fluid within the right mastoid air cells and right   middle ear cavity since August, 2023. Clinical correlation for middle   ear infection or otomastoiditis recommended. No evidence of   additional facial bone fracture. No cervical spine fracture.        Signed by: Jorgito Cartagena 12/8/2024 4:52 PM   Dictation workstation:   [id number]      XR elbow right 3+ views   Final Result   No acute bony abnormality right elbow.             MACRO:   None        Signed by: Neena Vences 12/8/2024 3:07 PM   Dictation workstation:   [id number]           Procedures  Procedures     EMERGENCY DEPARTMENT COURSE/MDM:     ED Course as of 12/08/24 1803   Sun Dec 08, 2024   1709 No  signs of otitis media when I looked with otoscope. [RD]      ED Course User Index  [RD] Radu Franklin DO         Diagnoses as of 12/08/24 1803   Closed fracture of nasal bone, initial encounter   Fall, initial encounter        Medical Decision Making  84-year-old female comes emergency room after a fall.  She is unsure exactly what happened, is not a good historian.  Did order cardiac workup, CT of the head, C-spine, facial bones, pelvis and a chest x-ray and elbow x-ray.    Independent review of labs, CBC is shows normal white count, stable hemoglobin, chronically decreased platelets.  CMP does show slight acute on chronic kidney injury, decreased bicarbonate level which was treated with a liter of fluids.  Troponin is negative, EKG shows sinus rhythm 65 bpm, QTc 463, no acute injury pattern.  With negative troponin and normal EKG I do not believe ACS with the cause of her symptoms today.  Chest x-ray shows no signs of pneumonia, pneumothorax, stat x-ray of the elbow shows no acute fracture.  Her tetanus was updated as there was a skin tear there which could not be sutured.  CT of the pelvis unremarkable, head CT shows no intracranial hemorrhage, mass effect, CT C-spine shows no cervical spine fracture, there is a nasal bone fracture on CT of the facial bones.  Patient was given dose of Tylenol here as well for pain management, was admitted to the hospital for syncope evaluation.    Amount and/or Complexity of Data Reviewed  ECG/medicine tests: ordered and independent interpretation performed.     Details:  EKG shows sinus rhythm 65 bpm, QTc 463,         Patient and or family in agreement and understanding of treatment plan.  All questions answered.      I reviewed the case with the attending ED physician. The attending ED physician agrees with the plan. Patient and/or patient´s representative was counseled regarding labs, imaging, likely diagnosis, and plan. All questions were answered.    ED Medications  administered this visit:    Medications   acetaminophen (Tylenol) tablet 650 mg (650 mg oral Not Given 12/8/24 1501)   lactated Ringer's bolus 1,000 mL (has no administration in time range)   diphth,pertus(acell),tetanus (BoostRIX) 2.5-8-5 Lf-mcg-Lf/0.5mL vaccine 0.5 mL (0.5 mL intramuscular Given 12/8/24 1502)       New Prescriptions from this visit:    New Prescriptions    No medications on file       Follow-up:  Julian Al [place]  [id number] [facility] [id number] [phone]    Schedule an appointment as soon as possible for a visit           Final Impression:   1. Closed fracture of nasal bone, initial encounter    2. Fall, initial encounter          (Please note that portions of this note were completed with a voice recognition program.  Efforts were made to edit the dictations but occasionally words are mis-transcribed.)     Radu Franklin DO  Resident  12/08/24 2072

## 2024-12-08 NOTE — DISCHARGE INSTRUCTIONS
It was a pleasure to meet you in the hospital  You have a broken nose on both sides; please follow these instructions carefully for the next 3-4 weeks.  Do not blow your nose! Wipe nasal secretions gently.  Do not use a straw.  Do not smoke.  Try to avoid sneezing. If you do sneeze, sneeze with your mouth open--do NOT block the sneeze by pinching your nose.  Avoid swimming, scuba diving, playing a wind instrument, blowing up balloons, or other things that cause pressure changes in your mouth.  If you feel congested or have a runny nose, use an over-the-counter nasal decongestant or antihistamine (Sudafed®, Claritin-D® 24, etc.).  Keep the site in your mouth clean as instructed by gently rinsing beginning the day after surgery. Do not use excessive force while rinsing. Avoid brushing directly over the area for the first 2 weeks. Leave the site alone--don’t touch it with fingers, toothpicks, etc. Do not use a Waterpik® near the site.  Avoid bending over--try to keep your head above the level of your heart. Sleep with your head slightly raised.  Do not strain by pushing or lifting heavy objects.  Chew softer foods on the other side of your mouth.  You should follow-up with your primary care physician within the next 2 weeks after discharge  The emergency department put in a referral for ENT, ugo will call and help you establish care with an appointment to be seen in the next month

## 2024-12-09 ENCOUNTER — HOME HEALTH ADMISSION (OUTPATIENT)
Dept: HOME HEALTH SERVICES | Facility: HOME HEALTH | Age: 84
End: 2024-12-09
Payer: MEDICARE

## 2024-12-09 PROBLEM — S02.2XXA CLOSED FRACTURE OF NASAL BONES: Status: ACTIVE | Noted: 2024-12-09

## 2024-12-09 LAB
ALBUMIN SERPL BCP-MCNC: 2.9 G/DL (ref 3.4–5)
ALP SERPL-CCNC: 38 U/L (ref 33–136)
ALT SERPL W P-5'-P-CCNC: 8 U/L (ref 7–45)
ANION GAP SERPL CALC-SCNC: 10 MMOL/L (ref 10–20)
AST SERPL W P-5'-P-CCNC: 24 U/L (ref 9–39)
BILIRUB SERPL-MCNC: 0.5 MG/DL (ref 0–1.2)
BUN SERPL-MCNC: 32 MG/DL (ref 6–23)
CALCIUM SERPL-MCNC: 8.5 MG/DL (ref 8.6–10.3)
CHLORIDE SERPL-SCNC: 111 MMOL/L (ref 98–107)
CO2 SERPL-SCNC: 22 MMOL/L (ref 21–32)
CREAT SERPL-MCNC: 1.58 MG/DL (ref 0.5–1.05)
EGFRCR SERPLBLD CKD-EPI 2021: 32 ML/MIN/1.73M*2
ERYTHROCYTE [DISTWIDTH] IN BLOOD BY AUTOMATED COUNT: 13.3 % (ref 11.5–14.5)
GLUCOSE SERPL-MCNC: 88 MG/DL (ref 74–99)
HCT VFR BLD AUTO: 34.4 % (ref 36–46)
HGB BLD-MCNC: 10.8 G/DL (ref 12–16)
MCH RBC QN AUTO: 30.3 PG (ref 26–34)
MCHC RBC AUTO-ENTMCNC: 31.4 G/DL (ref 32–36)
MCV RBC AUTO: 97 FL (ref 80–100)
NRBC BLD-RTO: 0 /100 WBCS (ref 0–0)
PLATELET # BLD AUTO: 134 X10*3/UL (ref 150–450)
POTASSIUM SERPL-SCNC: 5.4 MMOL/L (ref 3.5–5.3)
PROT SERPL-MCNC: 5.6 G/DL (ref 6.4–8.2)
RBC # BLD AUTO: 3.56 X10*6/UL (ref 4–5.2)
SODIUM SERPL-SCNC: 138 MMOL/L (ref 136–145)
WBC # BLD AUTO: 7.9 X10*3/UL (ref 4.4–11.3)

## 2024-12-09 PROCEDURE — 2500000002 HC RX 250 W HCPCS SELF ADMINISTERED DRUGS (ALT 637 FOR MEDICARE OP, ALT 636 FOR OP/ED): Mod: MUE | Performed by: INTERNAL MEDICINE

## 2024-12-09 PROCEDURE — 36415 COLL VENOUS BLD VENIPUNCTURE: CPT | Performed by: INTERNAL MEDICINE

## 2024-12-09 PROCEDURE — 97161 PT EVAL LOW COMPLEX 20 MIN: CPT | Mod: GP

## 2024-12-09 PROCEDURE — 80053 COMPREHEN METABOLIC PANEL: CPT | Performed by: INTERNAL MEDICINE

## 2024-12-09 PROCEDURE — 97165 OT EVAL LOW COMPLEX 30 MIN: CPT | Mod: GO

## 2024-12-09 PROCEDURE — 2500000001 HC RX 250 WO HCPCS SELF ADMINISTERED DRUGS (ALT 637 FOR MEDICARE OP): Performed by: INTERNAL MEDICINE

## 2024-12-09 PROCEDURE — 99239 HOSP IP/OBS DSCHRG MGMT >30: CPT | Performed by: INTERNAL MEDICINE

## 2024-12-09 PROCEDURE — G0378 HOSPITAL OBSERVATION PER HR: HCPCS

## 2024-12-09 PROCEDURE — 85027 COMPLETE CBC AUTOMATED: CPT | Performed by: INTERNAL MEDICINE

## 2024-12-09 PROCEDURE — 97535 SELF CARE MNGMENT TRAINING: CPT | Mod: GO

## 2024-12-09 PROCEDURE — 97116 GAIT TRAINING THERAPY: CPT | Mod: GP

## 2024-12-09 RX ORDER — LEVOTHYROXINE SODIUM 88 UG/1
88 TABLET ORAL DAILY
Status: DISCONTINUED | OUTPATIENT
Start: 2024-12-09 | End: 2024-12-10 | Stop reason: HOSPADM

## 2024-12-09 RX ORDER — ACETAMINOPHEN 325 MG/1
650 TABLET ORAL ONCE
Start: 2024-12-09 | End: 2024-12-09

## 2024-12-09 SDOH — SOCIAL STABILITY: SOCIAL INSECURITY
WITHIN THE LAST YEAR, HAVE YOU BEEN HUMILIATED OR EMOTIONALLY ABUSED IN OTHER WAYS BY YOUR PARTNER OR EX-PARTNER?: PATIENT DECLINED

## 2024-12-09 SDOH — ECONOMIC STABILITY: HOUSING INSECURITY: IN THE PAST 12 MONTHS, HOW MANY TIMES HAVE YOU MOVED WHERE YOU WERE LIVING?: 1

## 2024-12-09 SDOH — SOCIAL STABILITY: SOCIAL NETWORK
DO YOU BELONG TO ANY CLUBS OR ORGANIZATIONS SUCH AS CHURCH GROUPS, UNIONS, FRATERNAL OR ATHLETIC GROUPS, OR SCHOOL GROUPS?: NO

## 2024-12-09 SDOH — SOCIAL STABILITY: SOCIAL INSECURITY: ARE YOU MARRIED, WIDOWED, DIVORCED, SEPARATED, NEVER MARRIED, OR LIVING WITH A PARTNER?: WIDOWED

## 2024-12-09 SDOH — HEALTH STABILITY: PHYSICAL HEALTH
HOW OFTEN DO YOU NEED TO HAVE SOMEONE HELP YOU WHEN YOU READ INSTRUCTIONS, PAMPHLETS, OR OTHER WRITTEN MATERIAL FROM YOUR DOCTOR OR PHARMACY?: NEVER

## 2024-12-09 SDOH — ECONOMIC STABILITY: INCOME INSECURITY: IN THE PAST 12 MONTHS HAS THE ELECTRIC, GAS, OIL, OR WATER COMPANY THREATENED TO SHUT OFF SERVICES IN YOUR HOME?: NO

## 2024-12-09 SDOH — ECONOMIC STABILITY: FOOD INSECURITY: HOW HARD IS IT FOR YOU TO PAY FOR THE VERY BASICS LIKE FOOD, HOUSING, MEDICAL CARE, AND HEATING?: NOT HARD AT ALL

## 2024-12-09 SDOH — ECONOMIC STABILITY: FOOD INSECURITY: WITHIN THE PAST 12 MONTHS, THE FOOD YOU BOUGHT JUST DIDN'T LAST AND YOU DIDN'T HAVE MONEY TO GET MORE.: NEVER TRUE

## 2024-12-09 SDOH — ECONOMIC STABILITY: HOUSING INSECURITY: IN THE LAST 12 MONTHS, WAS THERE A TIME WHEN YOU WERE NOT ABLE TO PAY THE MORTGAGE OR RENT ON TIME?: NO

## 2024-12-09 SDOH — SOCIAL STABILITY: SOCIAL NETWORK: HOW OFTEN DO YOU GET TOGETHER WITH FRIENDS OR RELATIVES?: THREE TIMES A WEEK

## 2024-12-09 SDOH — ECONOMIC STABILITY: FOOD INSECURITY: WITHIN THE PAST 12 MONTHS, YOU WORRIED THAT YOUR FOOD WOULD RUN OUT BEFORE YOU GOT THE MONEY TO BUY MORE.: NEVER TRUE

## 2024-12-09 SDOH — SOCIAL STABILITY: SOCIAL INSECURITY
WITHIN THE LAST YEAR, HAVE YOU BEEN RAPED OR FORCED TO HAVE ANY KIND OF SEXUAL ACTIVITY BY YOUR PARTNER OR EX-PARTNER?: PATIENT DECLINED

## 2024-12-09 SDOH — HEALTH STABILITY: MENTAL HEALTH
DO YOU FEEL STRESS - TENSE, RESTLESS, NERVOUS, OR ANXIOUS, OR UNABLE TO SLEEP AT NIGHT BECAUSE YOUR MIND IS TROUBLED ALL THE TIME - THESE DAYS?: NOT AT ALL

## 2024-12-09 SDOH — HEALTH STABILITY: PHYSICAL HEALTH: ON AVERAGE, HOW MANY MINUTES DO YOU ENGAGE IN EXERCISE AT THIS LEVEL?: 0 MIN

## 2024-12-09 SDOH — SOCIAL STABILITY: SOCIAL INSECURITY: WITHIN THE LAST YEAR, HAVE YOU BEEN AFRAID OF YOUR PARTNER OR EX-PARTNER?: PATIENT DECLINED

## 2024-12-09 SDOH — SOCIAL STABILITY: SOCIAL NETWORK: HOW OFTEN DO YOU ATTEND CHURCH OR RELIGIOUS SERVICES?: 1 TO 4 TIMES PER YEAR

## 2024-12-09 SDOH — SOCIAL STABILITY: SOCIAL NETWORK
IN A TYPICAL WEEK, HOW MANY TIMES DO YOU TALK ON THE PHONE WITH FAMILY, FRIENDS, OR NEIGHBORS?: MORE THAN THREE TIMES A WEEK

## 2024-12-09 SDOH — SOCIAL STABILITY: SOCIAL INSECURITY
WITHIN THE LAST YEAR, HAVE YOU BEEN KICKED, HIT, SLAPPED, OR OTHERWISE PHYSICALLY HURT BY YOUR PARTNER OR EX-PARTNER?: PATIENT DECLINED

## 2024-12-09 SDOH — HEALTH STABILITY: PHYSICAL HEALTH: ON AVERAGE, HOW MANY DAYS PER WEEK DO YOU ENGAGE IN MODERATE TO STRENUOUS EXERCISE (LIKE A BRISK WALK)?: 0 DAYS

## 2024-12-09 SDOH — SOCIAL STABILITY: SOCIAL NETWORK: HOW OFTEN DO YOU ATTEND MEETINGS OF THE CLUBS OR ORGANIZATIONS YOU BELONG TO?: NEVER

## 2024-12-09 ASSESSMENT — COGNITIVE AND FUNCTIONAL STATUS - GENERAL
DAILY ACTIVITIY SCORE: 22
HELP NEEDED FOR BATHING: A LITTLE
MOVING TO AND FROM BED TO CHAIR: A LITTLE
STANDING UP FROM CHAIR USING ARMS: A LITTLE
CLIMB 3 TO 5 STEPS WITH RAILING: A LOT
MOBILITY SCORE: 20
PERSONAL GROOMING: A LITTLE
STANDING UP FROM CHAIR USING ARMS: A LITTLE
TOILETING: A LITTLE
MOBILITY SCORE: 20
CLIMB 3 TO 5 STEPS WITH RAILING: A LITTLE
DAILY ACTIVITIY SCORE: 21
DRESSING REGULAR LOWER BODY CLOTHING: A LITTLE
WALKING IN HOSPITAL ROOM: A LITTLE
WALKING IN HOSPITAL ROOM: A LITTLE
HELP NEEDED FOR BATHING: A LITTLE

## 2024-12-09 ASSESSMENT — PAIN SCALES - GENERAL
PAINLEVEL_OUTOF10: 0 - NO PAIN

## 2024-12-09 ASSESSMENT — ACTIVITIES OF DAILY LIVING (ADL)
BATHING_ASSISTANCE: MINIMAL
HOME_MANAGEMENT_TIME_ENTRY: 23
LACK_OF_TRANSPORTATION: NO

## 2024-12-09 ASSESSMENT — PAIN - FUNCTIONAL ASSESSMENT
PAIN_FUNCTIONAL_ASSESSMENT: 0-10

## 2024-12-09 ASSESSMENT — ENCOUNTER SYMPTOMS: WEAKNESS: 1

## 2024-12-09 NOTE — DISCHARGE SUMMARY
Discharge Diagnosis  Fall in elderly patient    Issues Requiring Follow-Up  It was a pleasure to meet you in the hospital  You have a broken nose on both sides; please follow these instructions carefully for the next 3-4 weeks.  Do not blow your nose! Wipe nasal secretions gently.  Do not use a straw.  Do not smoke.  Try to avoid sneezing. If you do sneeze, sneeze with your mouth open--do NOT block the sneeze by pinching your nose.  Avoid swimming, scuba diving, playing a wind instrument, blowing up balloons, or other things that cause pressure changes in your mouth.  If you feel congested or have a runny nose, use an over-the-counter nasal decongestant or antihistamine (Sudafed®, Claritin-D® 24, etc.).  Keep the site in your mouth clean as instructed by gently rinsing beginning the day after surgery. Do not use excessive force while rinsing. Avoid brushing directly over the area for the first 2 weeks. Leave the site alone--don’t touch it with fingers, toothpicks, etc. Do not use a Waterpik® near the site.  Avoid bending over--try to keep your head above the level of your heart. Sleep with your head slightly raised.  Do not strain by pushing or lifting heavy objects.  Chew softer foods on the other side of your mouth.  You should follow-up with your primary care physician within the next 2 weeks after discharge  The emergency department put in a referral for ENT, ugo will call and help you establish care with an appointment to be seen in the next month       Test Results Pending At Discharge  Pending Labs       No current pending labs.          Hospital Course  Lyndsey Rosado is a 84 y.o. female presenting with concern for worsening weakness.  After she sustained a mechanical fall while walking with her cane.  She states that she fell onto a hard surface with much of the impact being absorbed on her right side.  There is an approximately 3 x 3 cm skin tear on her right elbow.  There was noted to be a  minimally displaced fracture of the bilateral nasal bones with soft tissue swelling overlying the distal right nasal bone.  Admitted for further management.     Patient was observed overnight on telemetry with no noted issues and was evaluated by PT and OT and determined to be an appropriate candidate for discharge to home with home health care for PT and OT.    Greater than 30 minutes was spent facilitating this patients discharge from the hospital which included examining the patient, reconciling medications, and making arrangements for future care.    Sergey Lu MD  [facility]  Internal Medicine    This document was generated in whole or in part using the Dragon One medical voice recognition software and there may be some incorrect words/wording, spelling, or punctuation errors that were not corrected prior to finalization in the medical record.    Pertinent Physical Exam At Time of Discharge  Physical Exam  Vitals reviewed.   Constitutional:       Appearance: Normal appearance.   HENT:      Head: Normocephalic and atraumatic.      Nose: Nose normal.      Mouth/Throat:      Mouth: Mucous membranes are moist.   Eyes:      Extraocular Movements: Extraocular movements intact.      Conjunctiva/sclera: Conjunctivae normal.      Pupils: Pupils are equal, round, and reactive to light.   Cardiovascular:      Rate and Rhythm: Normal rate and regular rhythm.      Pulses: Normal pulses.      Heart sounds: Normal heart sounds.   Pulmonary:      Effort: Pulmonary effort is normal.      Breath sounds: Normal breath sounds.   Abdominal:      General: Bowel sounds are normal.      Palpations: Abdomen is soft.   Musculoskeletal:         General: Normal range of motion.      Cervical back: Normal range of motion and neck supple.   Skin:     General: Skin is warm and dry.      Findings: Lesion present.   Neurological:      General: No focal deficit present.      Mental Status: She is alert. Mental status is at  baseline.   Psychiatric:         Mood and Affect: Mood normal.         Behavior: Behavior normal.     Home Medications     Medication List      ASK your doctor about these medications     atorvastatin 10 mg tablet; Commonly known as: Lipitor   calcium carbonate-vitamin D3 600 mg-20 mcg (800 unit) tablet,chewable   levothyroxine 88 mcg capsule; Commonly known as: Tirosint   olmesartan 20 mg tablet; Commonly known as: BENIcar   pioglitazone 15 mg tablet; Commonly known as: Actos       Outpatient Follow-Up  No future appointments.    Sergey Lu MD

## 2024-12-09 NOTE — NURSING NOTE
Pt has discharge orders. Spoke with the pt's son. She will not have a ride home until tomorrow. [employer] will be providing the ride. Son stated that she may not have a way into her house as she does not have a key or the code for the back door. He stated that he will call back when he finds out if she will be able to get in the house.

## 2024-12-09 NOTE — PROGRESS NOTES
12/09/24 1036   Discharge Planning   Living Arrangements Alone   Support Systems Children   Assistance Needed yes   Type of Residence Private residence   Number of Stairs to Enter Residence 1   Number of Stairs Within Residence 0   Home or Post Acute Services None   Expected Discharge Disposition Home   Financial Resource Strain   How hard is it for you to pay for the very basics like food, housing, medical care, and heating? Not hard   Housing Stability   In the last 12 months, was there a time when you were not able to pay the mortgage or rent on time? N   In the past 12 months, how many times have you moved where you were living? 1   At any time in the past 12 months, were you homeless or living in a shelter (including now)? N   Transportation Needs   In the past 12 months, has lack of transportation kept you from medical appointments or from getting medications? no   In the past 12 months, has lack of transportation kept you from meetings, work, or from getting things needed for daily living? No   Stroke Family Assessment   Stroke Family Assessment Needed No   Intensity of Service   Intensity of Service 0-30 min     Spoke to patient at beside to explain my role in discharge planning. Patient states she lives at home alone and has private pay caregivers through [employer] for 5 hours a day. Patient uses a cane with ambulation. PCP is Dr Al. Patient states she did work with therapy already today.  Patient states she feels she effectively manages her health at home and plans to return home when medically ready. Will wait for therapy recommendations to complete d/c planning.     1500: Spoke to patient regarding therapy recommendation for low intensity. Patient agreeable to [facility] and would prefer [facility] since her PCP is Dr Al. Patient will need internal [facility] orders when medically ready for d/c.
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
## 2024-12-09 NOTE — H&P
History Of Present Illness  Lyndsey Rosado is a 84 y.o. female presenting with concern for worsening weakness.  After she sustained a mechanical fall while walking with her cane.  She states that she fell onto a hard surface with much of the impact being absorbed on her right side.  There is an approximately 3 x 3 cm skin tear on her right elbow.  There was noted to be a minimally displaced fracture of the bilateral nasal bones with soft tissue swelling overlying the distal right nasal bone.  Admitted for further management.     Past Medical History  Past Medical History:   Diagnosis Date    [place] (hard of hearing)     Hypothyroid        Surgical History  History reviewed. No pertinent surgical history.     Social History  She reports that she has never smoked. She has never used smokeless tobacco. She reports that she does not drink alcohol. No history on file for drug use.    Family History  No family history on file.     Allergies  Penicillin g and Penicillins    Review of Systems   Neurological:  Positive for weakness.        Physical Exam  Vitals reviewed.   Constitutional:       Appearance: Normal appearance.   HENT:      Head: Normocephalic and atraumatic.      Nose: Nose normal.      Mouth/Throat:      Mouth: Mucous membranes are moist.   Eyes:      Extraocular Movements: Extraocular movements intact.      Conjunctiva/sclera: Conjunctivae normal.      Pupils: Pupils are equal, round, and reactive to light.   Cardiovascular:      Rate and Rhythm: Normal rate and regular rhythm.      Pulses: Normal pulses.      Heart sounds: Normal heart sounds.   Pulmonary:      Effort: Pulmonary effort is normal.      Breath sounds: Normal breath sounds.   Abdominal:      General: Bowel sounds are normal.      Palpations: Abdomen is soft.   Musculoskeletal:         General: Normal range of motion.      Cervical back: Normal range of motion and neck supple.   Skin:     General: Skin is warm and dry.      Findings: Lesion  "present.   Neurological:      General: No focal deficit present.      Mental Status: She is alert. Mental status is at baseline.   Psychiatric:         Mood and Affect: Mood normal.         Behavior: Behavior normal.          Last Recorded Vitals  Blood pressure 125/64, pulse 67, temperature 37.3 °C (99.1 °F), temperature source Temporal, resp. rate 20, height 1.651 m (5' 5\"), weight 90.7 kg (200 lb), SpO2 97%.    Relevant Results  Scheduled medications  acetaminophen, 650 mg, oral, Once  atorvastatin, 10 mg, oral, Daily  pantoprazole, 40 mg, oral, Daily before breakfast   Or  pantoprazole, 40 mg, intravenous, Daily before breakfast  pioglitazone, 15 mg, oral, Daily      Continuous medications     PRN medications  PRN medications: acetaminophen **OR** acetaminophen **OR** acetaminophen, acetaminophen **OR** acetaminophen **OR** acetaminophen, melatonin, metoclopramide **OR** metoclopramide, ondansetron ODT **OR** ondansetron  XR chest 1 view    Result Date: 12/8/2024  Interpreted By:  Geneva Salcedo, STUDY: XR CHEST 1 VIEW; 12/8/2024 4:51 pm   INDICATION: Possible syncope   COMPARISON: Radiographs 06/21/2024   ACCESSION NUMBER(S): [id number]   ORDERING CLINICIAN: LEONEL JUNG   TECHNIQUE: Single frontal view of the chest performed.   FINDINGS: LINES AND DEVICES: None.   LUNGS: No focal consolidation, pulmonary edema, pleural effusion or pneumothorax.   CARDIOMEDIASTINAL SILHOUETTE: The cardiomediastinal silhouette is within normal limits.   OTHER: No acute displaced rib fractures.       No acute pulmonary process.   MACRO None   Signed by: Geneva Salcedo 12/8/2024 5:16 PM Dictation workstation:   [id number]    CT pelvis wo IV contrast    Result Date: 12/8/2024  Interpreted By:  Jorgito Cartagena, STUDY: CT PELVIS WO IV CONTRAST;  12/8/2024 4:28 pm   INDICATION: Signs/Symptoms:sacral pain after trauma - ro fx.     COMPARISON: None.   ACCESSION NUMBER(S): [id number]   ORDERING CLINICIAN: LEONEL JUNG   TECHNIQUE: " CT of the pelvis was obtained without intravenous contrast. Coronal and sagittal reconstructions at 3 mm slice thickness were performed.   FINDINGS: PELVIS:   BLADDER: Partially collapsed limiting assessment without gross abnormality.   REPRODUCTIVE ORGANS: Status post hysterectomy.   BOWEL: Partially visualized sigmoid diverticulosis without diverticulitis.     PERITONEUM/RETROPERITONEUM/LYMPH NODES: No free fluid in the pelvis. No lymphadenopathy in the pelvis or inguinal regions.   ABDOMINAL WALL: Unremarkable.   BONES: Sacroiliac joints are maintained with mild-to-moderate degenerative arthropathy. Pubic symphysis joints are maintained. Femoral heads are normal in contour and position. No evidence of acute fracture in the pelvis. There is osteopenia present. Disc bulge at L4-L5 contributes to mild bilateral foraminal stenosis. No central canal stenosis in the lower lumbar spine.       1.  No fracture is identified. There is osteopenia which limits evaluation for nondisplaced fractures.   MACRO: None   Signed by: Jorgito Cartagena 12/8/2024 5:04 PM Dictation workstation:   [id number]    CT head wo IV contrast    Result Date: 12/8/2024  Interpreted By:  Jorgito Cartagena, STUDY: CT HEAD WO IV CONTRAST; CT 3D RECONSTRUCTION; CT FACIAL BONES WO IV CONTRAST; CT CERVICAL SPINE WO IV CONTRAST;  12/8/2024 4:28 pm   INDICATION: Signs/Symptoms:fall; Signs/Symptoms:trauma; Signs/Symptoms:fall nose ttp and bruse. Altered mental status   COMPARISON: Head CT 08/20/2022.   ACCESSION NUMBER(S): [id number]; [id number]; [id number]; [id number]   ORDERING CLINICIAN: LEONEL ANTONIO   TECHNIQUE: Axial noncontrast CT images of the head, face, and cervical spine. 3D volume rendering of the facial bones was obtained on a separate workstation.   FINDINGS: BRAIN PARENCHYMA: Gray-white matter interfaces are preserved. No mass, mass effect or midline shift. Mild periventricular hypodensities suggestive of mild chronic  microvascular ischemic change.   HEMORRHAGE: No acute intracranial hemorrhage. VENTRICLES and EXTRA-AXIAL SPACES: No hydrocephalus. No extra-axial collections. There is significant generalized or central cerebral volume loss and associated ventricular and sulcal enlargement. EXTRACRANIAL SOFT TISSUES: CALVARIUM: No depressed calvarial fracture.   FACIAL BONES: There is minimal cortical irregularity of the right and left nasal bones, for example axial image 210 of 503 and there is mild overlying soft tissue swelling overlying the right distal nasal bone, for example coronal image 12. No additional fractures. SOFT TISSUES: Within normal limits. PARANASAL SINUSES: No hemorrhage in the paranasal sinuses. Mild polypoid mucosal thickening of the bilateral maxillary sinuses. MASTOIDS: There is new fluid within the right mastoid air cells and right middle ear cavity compared to 08/01/2023. Left mastoids are clear. ORBITS: The globes, extraocular muscles and optic nerve sheath complexes are symmetric. No retrobulbar hematoma. Bilateral cataract surgeries.   ALIGNMENT: Normal cervical lordosis. VERTEBRAE: No acute fracture. Vertebral body heights are maintained. There are no suspicious osseous lesions. Degenerative disc space narrowing and posterolateral osseous spurring at C5-C6 contributes to mild-to-moderate bilateral foraminal stenosis. SPINAL CANAL: Central disc osteophyte complex contributes to mild-to-moderate central canal stenosis at C5-C6. No critical spinal canal stenosis. PREVERTEBRAL SOFT TISSUES: No prevertebral soft tissue swelling. LUNG APICES: Imaged portion of the lung apices are within normal limits.   OTHER FINDINGS: None.         No acute intracranial abnormality. Moderate generalized central cerebral volume loss. Minimally displaced fractures of the bilateral nasal bones with soft tissue swelling overlying the distal right nasal bone. New fluid within the right mastoid air cells and right middle ear  cavity since August, 2023. Clinical correlation for middle ear infection or otomastoiditis recommended. No evidence of additional facial bone fracture. No cervical spine fracture.   Signed by: Jorgito Cartagena 12/8/2024 4:52 PM Dictation workstation:   [id number]    CT cervical spine wo IV contrast    Result Date: 12/8/2024  Interpreted By:  Jorgito Cartagena, STUDY: CT HEAD WO IV CONTRAST; CT 3D RECONSTRUCTION; CT FACIAL BONES WO IV CONTRAST; CT CERVICAL SPINE WO IV CONTRAST;  12/8/2024 4:28 pm   INDICATION: Signs/Symptoms:fall; Signs/Symptoms:trauma; Signs/Symptoms:fall nose ttp and bruse. Altered mental status   COMPARISON: Head CT 08/20/2022.   ACCESSION NUMBER(S): [id number]; [id number]; [id number]; [id number]   ORDERING CLINICIAN: LEONEL ANTONIO   TECHNIQUE: Axial noncontrast CT images of the head, face, and cervical spine. 3D volume rendering of the facial bones was obtained on a separate workstation.   FINDINGS: BRAIN PARENCHYMA: Gray-white matter interfaces are preserved. No mass, mass effect or midline shift. Mild periventricular hypodensities suggestive of mild chronic microvascular ischemic change.   HEMORRHAGE: No acute intracranial hemorrhage. VENTRICLES and EXTRA-AXIAL SPACES: No hydrocephalus. No extra-axial collections. There is significant generalized or central cerebral volume loss and associated ventricular and sulcal enlargement. EXTRACRANIAL SOFT TISSUES: CALVARIUM: No depressed calvarial fracture.   FACIAL BONES: There is minimal cortical irregularity of the right and left nasal bones, for example axial image 210 of 503 and there is mild overlying soft tissue swelling overlying the right distal nasal bone, for example coronal image 12. No additional fractures. SOFT TISSUES: Within normal limits. PARANASAL SINUSES: No hemorrhage in the paranasal sinuses. Mild polypoid mucosal thickening of the bilateral maxillary sinuses. MASTOIDS: There is new fluid within the right mastoid air  cells and right middle ear cavity compared to 08/01/2023. Left mastoids are clear. ORBITS: The globes, extraocular muscles and optic nerve sheath complexes are symmetric. No retrobulbar hematoma. Bilateral cataract surgeries.   ALIGNMENT: Normal cervical lordosis. VERTEBRAE: No acute fracture. Vertebral body heights are maintained. There are no suspicious osseous lesions. Degenerative disc space narrowing and posterolateral osseous spurring at C5-C6 contributes to mild-to-moderate bilateral foraminal stenosis. SPINAL CANAL: Central disc osteophyte complex contributes to mild-to-moderate central canal stenosis at C5-C6. No critical spinal canal stenosis. PREVERTEBRAL SOFT TISSUES: No prevertebral soft tissue swelling. LUNG APICES: Imaged portion of the lung apices are within normal limits.   OTHER FINDINGS: None.         No acute intracranial abnormality. Moderate generalized central cerebral volume loss. Minimally displaced fractures of the bilateral nasal bones with soft tissue swelling overlying the distal right nasal bone. New fluid within the right mastoid air cells and right middle ear cavity since August, 2023. Clinical correlation for middle ear infection or otomastoiditis recommended. No evidence of additional facial bone fracture. No cervical spine fracture.   Signed by: Jorgito Cartagena 12/8/2024 4:52 PM Dictation workstation:   [id number]    CT maxillofacial bones wo IV contrast    Result Date: 12/8/2024  Interpreted By:  Jorgito Cartagena, STUDY: CT HEAD WO IV CONTRAST; CT 3D RECONSTRUCTION; CT FACIAL BONES WO IV CONTRAST; CT CERVICAL SPINE WO IV CONTRAST;  12/8/2024 4:28 pm   INDICATION: Signs/Symptoms:fall; Signs/Symptoms:trauma; Signs/Symptoms:fall nose ttp and bruse. Altered mental status   COMPARISON: Head CT 08/20/2022.   ACCESSION NUMBER(S): [id number]; [id number]; [id number]; [id number]   ORDERING CLINICIAN: LEONEL ANTONIO   TECHNIQUE: Axial noncontrast CT images of the head, face,  and cervical spine. 3D volume rendering of the facial bones was obtained on a separate workstation.   FINDINGS: BRAIN PARENCHYMA: Gray-white matter interfaces are preserved. No mass, mass effect or midline shift. Mild periventricular hypodensities suggestive of mild chronic microvascular ischemic change.   HEMORRHAGE: No acute intracranial hemorrhage. VENTRICLES and EXTRA-AXIAL SPACES: No hydrocephalus. No extra-axial collections. There is significant generalized or central cerebral volume loss and associated ventricular and sulcal enlargement. EXTRACRANIAL SOFT TISSUES: CALVARIUM: No depressed calvarial fracture.   FACIAL BONES: There is minimal cortical irregularity of the right and left nasal bones, for example axial image 210 of 503 and there is mild overlying soft tissue swelling overlying the right distal nasal bone, for example coronal image 12. No additional fractures. SOFT TISSUES: Within normal limits. PARANASAL SINUSES: No hemorrhage in the paranasal sinuses. Mild polypoid mucosal thickening of the bilateral maxillary sinuses. MASTOIDS: There is new fluid within the right mastoid air cells and right middle ear cavity compared to 08/01/2023. Left mastoids are clear. ORBITS: The globes, extraocular muscles and optic nerve sheath complexes are symmetric. No retrobulbar hematoma. Bilateral cataract surgeries.   ALIGNMENT: Normal cervical lordosis. VERTEBRAE: No acute fracture. Vertebral body heights are maintained. There are no suspicious osseous lesions. Degenerative disc space narrowing and posterolateral osseous spurring at C5-C6 contributes to mild-to-moderate bilateral foraminal stenosis. SPINAL CANAL: Central disc osteophyte complex contributes to mild-to-moderate central canal stenosis at C5-C6. No critical spinal canal stenosis. PREVERTEBRAL SOFT TISSUES: No prevertebral soft tissue swelling. LUNG APICES: Imaged portion of the lung apices are within normal limits.   OTHER FINDINGS: None.         No  acute intracranial abnormality. Moderate generalized central cerebral volume loss. Minimally displaced fractures of the bilateral nasal bones with soft tissue swelling overlying the distal right nasal bone. New fluid within the right mastoid air cells and right middle ear cavity since August, 2023. Clinical correlation for middle ear infection or otomastoiditis recommended. No evidence of additional facial bone fracture. No cervical spine fracture.   Signed by: Jorgito Cartagena 12/8/2024 4:52 PM Dictation workstation:   [id number]    CT 3D reconstruction    Result Date: 12/8/2024  Interpreted By:  Jorgito Cartagena, STUDY: CT HEAD WO IV CONTRAST; CT 3D RECONSTRUCTION; CT FACIAL BONES WO IV CONTRAST; CT CERVICAL SPINE WO IV CONTRAST;  12/8/2024 4:28 pm   INDICATION: Signs/Symptoms:fall; Signs/Symptoms:trauma; Signs/Symptoms:fall nose ttp and bruse. Altered mental status   COMPARISON: Head CT 08/20/2022.   ACCESSION NUMBER(S): [id number]; [id number]; [id number]; [id number]   ORDERING CLINICIAN: LEONEL ANTONIO   TECHNIQUE: Axial noncontrast CT images of the head, face, and cervical spine. 3D volume rendering of the facial bones was obtained on a separate workstation.   FINDINGS: BRAIN PARENCHYMA: Gray-white matter interfaces are preserved. No mass, mass effect or midline shift. Mild periventricular hypodensities suggestive of mild chronic microvascular ischemic change.   HEMORRHAGE: No acute intracranial hemorrhage. VENTRICLES and EXTRA-AXIAL SPACES: No hydrocephalus. No extra-axial collections. There is significant generalized or central cerebral volume loss and associated ventricular and sulcal enlargement. EXTRACRANIAL SOFT TISSUES: CALVARIUM: No depressed calvarial fracture.   FACIAL BONES: There is minimal cortical irregularity of the right and left nasal bones, for example axial image 210 of 503 and there is mild overlying soft tissue swelling overlying the right distal nasal bone, for example  coronal image 12. No additional fractures. SOFT TISSUES: Within normal limits. PARANASAL SINUSES: No hemorrhage in the paranasal sinuses. Mild polypoid mucosal thickening of the bilateral maxillary sinuses. MASTOIDS: There is new fluid within the right mastoid air cells and right middle ear cavity compared to 08/01/2023. Left mastoids are clear. ORBITS: The globes, extraocular muscles and optic nerve sheath complexes are symmetric. No retrobulbar hematoma. Bilateral cataract surgeries.   ALIGNMENT: Normal cervical lordosis. VERTEBRAE: No acute fracture. Vertebral body heights are maintained. There are no suspicious osseous lesions. Degenerative disc space narrowing and posterolateral osseous spurring at C5-C6 contributes to mild-to-moderate bilateral foraminal stenosis. SPINAL CANAL: Central disc osteophyte complex contributes to mild-to-moderate central canal stenosis at C5-C6. No critical spinal canal stenosis. PREVERTEBRAL SOFT TISSUES: No prevertebral soft tissue swelling. LUNG APICES: Imaged portion of the lung apices are within normal limits.   OTHER FINDINGS: None.         No acute intracranial abnormality. Moderate generalized central cerebral volume loss. Minimally displaced fractures of the bilateral nasal bones with soft tissue swelling overlying the distal right nasal bone. New fluid within the right mastoid air cells and right middle ear cavity since August, 2023. Clinical correlation for middle ear infection or otomastoiditis recommended. No evidence of additional facial bone fracture. No cervical spine fracture.   Signed by: Jorgito Cartagena 12/8/2024 4:52 PM Dictation workstation:   [id number]    XR elbow right 3+ views    Result Date: 12/8/2024  Interpreted By:  Neena Vences, STUDY: XR ELBOW RIGHT 3+ VIEWS; ;  12/8/2024 2:43 pm   INDICATION: Signs/Symptoms:fall, elbow pain.     COMPARISON: None.   ACCESSION NUMBER(S): [id number]   ORDERING CLINICIAN: LEONEL JUNG   FINDINGS: Five views right  elbow: There is no acute fracture or dislocation or joint effusion. There is partial visualization of an orthopedic screw and plate device in the distal humeral diaphysis.       No acute bony abnormality right elbow.     MACRO: None   Signed by: Neena Vences 12/8/2024 3:07 PM Dictation workstation:   [id number]   Results for orders placed or performed during the hospital encounter of 12/08/24 (from the past 24 hours)   CBC and Auto Differential   Result Value Ref Range    WBC 9.3 4.4 - 11.3 x10*3/uL    nRBC 0.0 0.0 - 0.0 /100 WBCs    RBC 3.54 (L) 4.00 - 5.20 x10*6/uL    Hemoglobin 10.6 (L) 12.0 - 16.0 g/dL    Hematocrit 34.5 (L) 36.0 - 46.0 %    MCV 98 80 - 100 fL    MCH 29.9 26.0 - 34.0 pg    MCHC 30.7 (L) 32.0 - 36.0 g/dL    RDW 13.4 11.5 - 14.5 %    Platelets 141 (L) 150 - 450 x10*3/uL    Neutrophils % 81.1 40.0 - 80.0 %    Immature Granulocytes %, Automated 0.4 0.0 - 0.9 %    Lymphocytes % 11.6 13.0 - 44.0 %    Monocytes % 6.6 2.0 - 10.0 %    Eosinophils % 0.1 0.0 - 6.0 %    Basophils % 0.2 0.0 - 2.0 %    Neutrophils Absolute 7.55 (H) 1.60 - 5.50 x10*3/uL    Immature Granulocytes Absolute, Automated 0.04 0.00 - 0.50 x10*3/uL    Lymphocytes Absolute 1.08 0.80 - 3.00 x10*3/uL    Monocytes Absolute 0.61 0.05 - 0.80 x10*3/uL    Eosinophils Absolute 0.01 0.00 - 0.40 x10*3/uL    Basophils Absolute 0.02 0.00 - 0.10 x10*3/uL   Comprehensive metabolic panel   Result Value Ref Range    Glucose 107 (H) 74 - 99 mg/dL    Sodium 139 136 - 145 mmol/L    Potassium 4.7 3.5 - 5.3 mmol/L    Chloride 109 (H) 98 - 107 mmol/L    Bicarbonate 20 (L) 21 - 32 mmol/L    Anion Gap 15 10 - 20 mmol/L    Urea Nitrogen 37 (H) 6 - 23 mg/dL    Creatinine 1.81 (H) 0.50 - 1.05 mg/dL    eGFR 27 (L) >60 mL/min/1.73m*2    Calcium 9.2 8.6 - 10.3 mg/dL    Albumin 3.5 3.4 - 5.0 g/dL    Alkaline Phosphatase 40 33 - 136 U/L    Total Protein 6.5 6.4 - 8.2 g/dL    AST 17 9 - 39 U/L    Bilirubin, Total 0.5 0.0 - 1.2 mg/dL    ALT 8 7 - 45 U/L   Troponin  I, High Sensitivity   Result Value Ref Range    Troponin I, High Sensitivity 5 0 - 13 ng/L          Assessment/Plan   Assessment & Plan  Fall in elderly patient      Patient presents to this facility after sustaining a mechanical fall.  She tripped over the floor while using her cane and fell onto her right side.  She has a 3 x 3 cm skin tear as well as a nasal bone fracture.    -Story is described not overly concerning for syncope.  There was no confirmed loss of consciousness and the description seems much more mechanical in nature.    -Laboratory studies obtained overall reassuring.  Creatinine of 1.81 represents the upper limit of her normal baseline.    -We will ask PT/OT to evaluate patient in order to determine the most appropriate discharge disposition.  I do not believe any further syncope workup is indicated at this time and the remainder of this hospital admission will be dedicated to determining an appropriate discharge disposition and ensuring patient's safety and appropriate transitional care.    Diet: Regular  Fluids: N/A  DVT prophylaxis: SCDs  Telemetry: Not Indicated    Home Medications: Reviewed and reconciled     I spent >75 minutes in the professional and overall care of this patient.      Keyur Jackson, DO

## 2024-12-09 NOTE — PROGRESS NOTES
Physical Therapy    Physical Therapy Evaluation    Patient Name: Lyndsey Rosado  MRN: [id number]  Today's Date: 12/9/2024   Time Calculation  Start Time: 1002  Stop Time: 1018  Time Calculation (min): 16 min  3101/3101-A    Assessment/Plan   PT Assessment: Pt demonstrates impairments listed below.  Pt appears below baseline level of function and based on current level of function, pt would benefit from continued skilled therapy while in the hospital to ensure safety, decrease risk of falls, and regain strength/mobility back to baseline.  Once stable enough for discharge, pt would benefit from low intensity therapy.     PT Assessment Results: Decreased strength, Impaired judgement, Decreased safety awareness  Rehab Prognosis: Good  Evaluation/Treatment Tolerance: Patient tolerated treatment well  Medical Staff Made Aware: Yes  End of Session Communication: Bedside nurse  End of Session Patient Position: Up in chair, Alarm on  IP OR SWING BED PT PLAN  Inpatient or Swing Bed: Inpatient  PT Plan  Treatment/Interventions: Bed mobility, Transfer training, Gait training, Stair training, Balance training, Neuromuscular re-education, Strengthening, Range of motion, Therapeutic exercise, Therapeutic activity, Home exercise program  PT Plan: Ongoing PT  PT Frequency: 3 times per week  PT Discharge Recommendations: Low intensity level of continued care  Equipment Recommended upon Discharge: Wheeled walker  PT Recommended Transfer Status: Stand by assist  PT - OK to Discharge: Yes - To next level of care when cleared by medical team    Subjective     Current Problem:  1. Closed fracture of nasal bone, initial encounter  Referral to ENT      2. Fall, initial encounter            Past Medical History:  Patient Active Problem List   Diagnosis    Mixed hyperlipidemia    Hypothyroidism, unspecified    Essential hypertension    Anemia    Stage 3b chronic kidney disease (Multi)    Vitamin D deficiency    Syncope    Cardiac arrhythmia     Hypomagnesemia    Syncope, near    Fall in elderly patient       General Visit Information:  Per EMR: pt presenting with concern for worsening weakness.  After she sustained a mechanical fall while walking with her cane.  She states that she fell onto a hard surface with much of the impact being absorbed on her right side.  There is an approximately 3 x 3 cm skin tear on her right elbow.  There was noted to be a minimally displaced fracture of the bilateral nasal bones with soft tissue swelling overlying the distal right nasal bone.  Admitted for further management.     On arrival, pt supine in bed.  Pt in no apparent distress and agreeable to therapy.    General  Reason for Referral: impaired mobility  Referred By: Sergey Lu  Prior to Session Communication: Bedside nurse  Patient Position Received: Bed, 3 rail up, Alarm on    Home Living/PLOF:  Pt lives alone in house with 1 GEE.  1 floor set up.  Sleeps in adjustable bed/  SPC use at all times, owns FWW.  Has visiting angels 5x/wk for 5 hrs/day.  Seems as though they mainly just help with housekeeping and transportation.  Pt is alone on the weekend but states she is able to manage.  Has WIS with chair and Gbs.  IND with ADLs.  Pt denies any other falls other than 1 prior to admission.    Precautions:  Precautions  Medical Precautions: Fall precautions     Objective     Pain:  Pain Assessment  Pain Assessment: 0-10  0-10 (Numeric) Pain Score: 0 - No pain    Cognition:  Cognition  Overall Cognitive Status: Within Functional Limits  Orientation Level: Oriented X4    General Assessments:      Activity Tolerance  Endurance: Endurance does not limit participation in activity    Static Sitting Balance  Static Sitting-Comment/Number of Minutes: good  Dynamic Sitting Balance  Dynamic Sitting-Comments: good  Static Standing Balance  Static Standing-Comment/Number of Minutes: good  Dynamic Standing Balance  Dynamic Standing-Comments: fair plus    Extremity/Trunk  Assessments:  BLE strength: grossly WFL    Functional Mobility:  Bed mobility  Supine to sit: SUP; pt transferred to EOB by self before therapist entered room     Transfers  Sit to stand: SBA x2 trials  Stand to sit: SBA x2 trials    Ambulation/Stairs  Pt ambulated 15 ft x1 and 6 ft x1 with SBA and FWW; grossly steady without LOB    Outcome Measures:  [facility] Basic Mobility  Turning from your back to your side while in a flat bed without using bedrails: None  Moving from lying on your back to sitting on the side of a flat bed without using bedrails: None  Moving to and from bed to chair (including a wheelchair): A little  Standing up from a chair using your arms (e.g. wheelchair or bedside chair): A little  To walk in hospital room: A little  Climbing 3-5 steps with railing: A little  Basic Mobility - Total Score: 20    Goals:  Encounter Problems       Encounter Problems (Active)       PT Problem       Pt will be able to perform all bed mobility tasks with Mod I.  (Progressing)       Start:  12/09/24    Expected End:  12/23/24            Pt will perform all transfers with Mod I and LRAD with proper safety mechanics.   (Progressing)       Start:  12/09/24    Expected End:  12/23/24            Pt will ambulate 100 ft with Mod I using LRAD for improved functional independence.  (Progressing)       Start:  12/09/24    Expected End:  12/23/24            Pt will be able to negotiate 1 step with 1 HR with Mod I.  (Progressing)       Start:  12/09/24    Expected End:  12/23/24                 Education Documentation  Body Mechanics, taught by Liliya Bryant, PT at 12/9/2024  1:30 PM.  Learner: Patient  Readiness: Acceptance  Method: Explanation  Response: Verbalizes Understanding    Home Exercise Program, taught by Liliya Bryant, PT at 12/9/2024  1:30 PM.  Learner: Patient  Readiness: Acceptance  Method: Explanation  Response: Verbalizes Understanding    Mobility Training, taught by Liliya Bryant, PT at 12/9/2024   1:30 PM.  Learner: Patient  Readiness: Acceptance  Method: Explanation  Response: Verbalizes Understanding    Education Comments  No comments found.

## 2024-12-09 NOTE — CARE PLAN
The patient's goals for the shift include sleep    The clinical goals for the shift include pt will be hemodynamically stable this shift    Over the shift, the patient did  make progress toward the following goals. Pt  slept most of shift w/o incident. Denied discomfort.  Remained hemodynamically stable this shift.  Safety measures maintained.

## 2024-12-10 ENCOUNTER — DOCUMENTATION (OUTPATIENT)
Dept: HOME HEALTH SERVICES | Facility: HOME HEALTH | Age: 84
End: 2024-12-10
Payer: MEDICARE

## 2024-12-10 VITALS
SYSTOLIC BLOOD PRESSURE: 169 MMHG | RESPIRATION RATE: 19 BRPM | OXYGEN SATURATION: 97 % | HEART RATE: 70 BPM | HEIGHT: 65 IN | DIASTOLIC BLOOD PRESSURE: 74 MMHG | TEMPERATURE: 96.8 F | BODY MASS INDEX: 33.32 KG/M2 | WEIGHT: 200 LBS

## 2024-12-10 LAB
ATRIAL RATE: 65 BPM
P AXIS: 66 DEGREES
P OFFSET: 186 MS
P ONSET: 138 MS
PR INTERVAL: 162 MS
Q ONSET: 219 MS
QRS COUNT: 10 BEATS
QRS DURATION: 116 MS
QT INTERVAL: 446 MS
QTC CALCULATION(BAZETT): 463 MS
QTC FREDERICIA: 457 MS
R AXIS: 24 DEGREES
T AXIS: 11 DEGREES
T OFFSET: 442 MS
VENTRICULAR RATE: 65 BPM

## 2024-12-10 PROCEDURE — G0378 HOSPITAL OBSERVATION PER HR: HCPCS

## 2024-12-10 PROCEDURE — 2500000002 HC RX 250 W HCPCS SELF ADMINISTERED DRUGS (ALT 637 FOR MEDICARE OP, ALT 636 FOR OP/ED): Mod: MUE | Performed by: INTERNAL MEDICINE

## 2024-12-10 PROCEDURE — 2500000001 HC RX 250 WO HCPCS SELF ADMINISTERED DRUGS (ALT 637 FOR MEDICARE OP): Performed by: INTERNAL MEDICINE

## 2024-12-10 ASSESSMENT — COGNITIVE AND FUNCTIONAL STATUS - GENERAL
WALKING IN HOSPITAL ROOM: A LITTLE
HELP NEEDED FOR BATHING: A LITTLE
DAILY ACTIVITIY SCORE: 21
STANDING UP FROM CHAIR USING ARMS: A LITTLE
CLIMB 3 TO 5 STEPS WITH RAILING: A LOT
TOILETING: A LITTLE
MOBILITY SCORE: 20
DRESSING REGULAR LOWER BODY CLOTHING: A LITTLE

## 2024-12-10 ASSESSMENT — PAIN SCALES - GENERAL: PAINLEVEL_OUTOF10: 0 - NO PAIN

## 2024-12-10 NOTE — PROGRESS NOTES
12/10/24 0814   Discharge Planning   Living Arrangements Alone   Support Systems Children   Type of Residence Private residence   Home or Post Acute Services In home services   Type of Home Care Services Home health aide;Home nursing visits   Expected Discharge Disposition Home H     Medically ready for discharge today with [facility]. Referral sent. Intake notified. Julian Al to follow.     0813 [facility] confirmed SOC 24-48 hours.

## 2024-12-10 NOTE — ED PROCEDURE NOTE
Procedure  Critical Care    Performed by: Irais Gant MD  Authorized by: Irais Gant MD    Critical care provider statement:     Critical care time (minutes):  38    Critical care time was exclusive of:  Separately billable procedures and treating other patients and teaching time    Critical care was necessary to treat or prevent imminent or life-threatening deterioration of the following conditions:  Trauma    Critical care was time spent personally by me on the following activities:  Blood draw for specimens, development of treatment plan with patient or surrogate, obtaining history from patient or surrogate, examination of patient, evaluation of patient's response to treatment, ordering and performing treatments and interventions, ordering and review of laboratory studies, ordering and review of radiographic studies, pulse oximetry, re-evaluation of patient's condition and review of old charts    Care discussed with: admitting provider        Patient received an expedited trauma evaluation given her age and complaint of fall.  She had signs of head trauma but was not on any blood thinners.  She did not meet criteria for trauma activation but did receive an expedited trauma and syncope workup including expedited CT head to rule out intracranial bleeding, expedited CT C-spine to evaluate for C-spine fracture.  Given that she was unable to recall the details of exactly how she fell she also required a syncope evaluation.  This involves getting workup for arrhythmia, pneumonia, ACS, electrolyte/metabolic abnormalities (hyper/hypoglycemia, hyper/hypokalemia, hyper/hyponatremia, etc.) electrolyte abnormalities concerning for adrenal dysfunction (sodium/potassium abnormalities), SHEILA, anemia, leukocytosis         Irais Gant MD  12/10/24 1368

## 2024-12-10 NOTE — CARE PLAN
Problem: Pain - Adult  Goal: Verbalizes/displays adequate comfort level or baseline comfort level  Outcome: Progressing     Problem: Safety - Adult  Goal: Free from fall injury  Outcome: Progressing     Problem: Discharge Planning  Goal: Discharge to home or other facility with appropriate resources  Outcome: Progressing     Problem: Chronic Conditions and Co-morbidities  Goal: Patient's chronic conditions and co-morbidity symptoms are monitored and maintained or improved  Outcome: Progressing     Problem: Fall/Injury  Goal: Not fall by end of shift  Outcome: Progressing  Goal: Be free from injury by end of the shift  Outcome: Progressing  Goal: Verbalize understanding of personal risk factors for fall in the hospital  Outcome: Progressing  Goal: Verbalize understanding of risk factor reduction measures to prevent injury from fall in the home  Outcome: Progressing  Goal: Use assistive devices by end of the shift  Outcome: Progressing  Goal: Pace activities to prevent fatigue by end of the shift  Outcome: Progressing       Flowsheets (Taken 12/9/2024 2329)  DGoal: Participates in plan/prevention/treatment measures  Outcome: Progressing  Flowsheets (Taken 12/9/2024 2329)  Participates in plan/prevention/treatment measures:   Elevate heels   Discuss with provider PT/OT consult   Increase activity/out of bed for meals  Goal: Prevent/manage excess moisture  Outcome: Progressing  Flowsheets (Taken 12/9/2024 2329)  Prevent/manage excess moisture: Use wicking fabric (obtain order)  Goal: Prevent/minimize sheer/friction injuries  Outcome: Progressing  Flowsheets (Taken 12/9/2024 2329)  Prevent/minimize sheer/friction injuries:   HOB 30 degrees or less   Use pull sheet  Goal: Promote/optimize nutrition  Outcome: Progressing  Flowsheets (Taken 12/9/2024 2329)  Promote/optimize nutrition: Offer water/supplements/favorite foods  Goal: Promote skin healing  Outcome: Progressing  Flowsheets (Taken 12/9/2024 2329)  Promote skin  healing: Assess skin/pad under line(s)/device(s)   The patient's goals for the shift include sleep    The clinical goals for the shift include pt will remain free from falls and injury throughout the shift

## 2024-12-10 NOTE — HH CARE COORDINATION
Home Care received a Referral for Physical Therapy and Occupational Therapy. We have processed the referral for a Start of Care on 12-11-24, 24-48 HOURS.     If you have any questions or concerns, please feel free to contact us at [phone]. Follow the prompts, enter your five digit zip code, and you will be directed to your care team on [facility].

## 2024-12-11 ENCOUNTER — HOME CARE VISIT (OUTPATIENT)
Dept: HOME HEALTH SERVICES | Facility: HOME HEALTH | Age: 84
End: 2024-12-11
Payer: MEDICARE

## 2024-12-11 VITALS — OXYGEN SATURATION: 97 % | HEART RATE: 72 BPM | DIASTOLIC BLOOD PRESSURE: 42 MMHG | SYSTOLIC BLOOD PRESSURE: 119 MMHG

## 2024-12-11 PROCEDURE — G0151 HHCP-SERV OF PT,EA 15 MIN: HCPCS | Mod: HHH

## 2024-12-11 PROCEDURE — 169592 NO-PAY CLAIM PROCEDURE

## 2024-12-11 ASSESSMENT — ACTIVITIES OF DAILY LIVING (ADL)
ENTERING_EXITING_HOME: ONE PERSON
OASIS_M1830: 03

## 2024-12-11 ASSESSMENT — ENCOUNTER SYMPTOMS
DENIES PAIN: 1
PERSON REPORTING PAIN: PATIENT

## 2024-12-12 ENCOUNTER — HOME CARE VISIT (OUTPATIENT)
Dept: HOME HEALTH SERVICES | Facility: HOME HEALTH | Age: 84
End: 2024-12-12
Payer: MEDICARE

## 2024-12-12 VITALS — SYSTOLIC BLOOD PRESSURE: 132 MMHG | HEART RATE: 79 BPM | DIASTOLIC BLOOD PRESSURE: 82 MMHG

## 2024-12-12 PROCEDURE — G0152 HHCP-SERV OF OT,EA 15 MIN: HCPCS | Mod: HHH | Performed by: OCCUPATIONAL THERAPIST

## 2024-12-12 ASSESSMENT — GAIT ASSESSMENTS
TRUNK SCORE: 0
STEP CONTINUITY: 1 - STEPS APPEAR CONTINUOUS
BALANCE AND GAIT SCORE: 19
WALKING STANCE: 0 - HEELS APART
GAIT SCORE: 8
PATH SCORE: 1
INITIATION OF GAIT IMMEDIATELY AFTER GO: 1 - NO HESITANCY
PATH: 1 - MILD/MODERATE DEVIATION OR USES WALKING AID
STEP SYMMETRY: 1 - RIGHT AND LEFT STEP LENGTH APPEAR EQUAL
TRUNK: 0 - MARKED SWAY OR USES WALKING AID

## 2024-12-12 ASSESSMENT — ACTIVITIES OF DAILY LIVING (ADL)
BATHING_CURRENT_FUNCTION: MINIMUM ASSIST
TOILETING: STAND BY ASSIST
DRESSING_UB_CURRENT_FUNCTION: INDEPENDENT
CURRENT_FUNCTION: STAND BY ASSIST
TOILETING: 1
GROOMING ASSESSED: 1
AMBULATION ASSISTANCE ON FLAT SURFACES: 1
AMBULATION ASSISTANCE: 1
DRESSING_LB_CURRENT_FUNCTION: INDEPENDENT
PHYSICAL TRANSFERS ASSESSED: 1
GROOMING_CURRENT_FUNCTION: STAND BY ASSIST
BATHING ASSESSED: 1
PREPARING MEALS: DEPENDENT

## 2024-12-12 ASSESSMENT — BALANCE ASSESSMENTS
ARISES: 1 - ABLE, USES ARMS TO HELP
SITTING BALANCE: 1 - STEADY, SAFE
ATTEMPTS TO ARISE: 2 - ABLE TO RISE, ONE ATTEMPT
BALANCE SCORE: 11
NUDGED: 2 - STEADY
EYES CLOSED AT MAXIMUM POSITION NUDGED: 0 - UNSTEADY
STANDING BALANCE: 2 - NARROW STANCE WITHOUT SUPPORT
SITTING DOWN: 1 - USES ARMS OR NOT SMOOTH MOTION
IMMEDIATE STANDING BALANCE FIRST 5 SECONDS: 1 - STEADY BUT USES WALKER OR OTHER SUPPORT
NUDGED SCORE: 2
ARISING SCORE: 1
TURNING 360 DEGREES STEPS: 0 - DISCONTINUOUS STEPS

## 2024-12-12 ASSESSMENT — ENCOUNTER SYMPTOMS: DENIES PAIN: 1

## 2024-12-12 NOTE — PROGRESS NOTES
Facial Plastic & Reconstructive Surgery    Reason for consult: Nasal fracture    Referring provider: ED Dr. Gant     HPI:  Lyndsey Rosado is a 84 y.o. female who lives alone, presents with caregiver Irene, with PMHx of hypothyroidism, syncope, HTN, HLD, cardiac arrhythmia, CKD3 who presents today for surgical evaluation of nasal fracture following mechanical fall on kitchen floor on 12/8/24.  At that time, caregiver endorses nasal bleeding which stopped spontaneously after going to ED.  Currently endorses mild TTP of nose, a/w swelling to the nose.     Blood thinners: No  Hx of falls: yes    Previous CT/MRI imaging of the nasal cavity and sinuses:  I personally reviewed the CT MFB 12/8/24 and this is my impression: Rightward septal deviation, minimally displaced bilateral nasal bone fractures, distal nasal bone fracture      Past Medical History  She has a past medical history of [place] (hard of hearing) and Hypothyroid.    Surgical History  She has no past surgical history on file.     Social History  She reports that she has never smoked. She has never used smokeless tobacco. She reports that she does not drink alcohol. No history on file for drug use.    Family History  No family history on file.     Allergies  Penicillin g and Penicillins    Physical exam    General: Well-developed and well-nourished in appearance.  Skin: No rashes or concerning lesions on the visible portions of the skin. Ecchymosis of bilateral lower eyelids, Lacerations along nasal dorsum healing well.   Nose:  Mild TTP of dorsal nasal complex ; No steps offs; No saddle nose. Mild leftward deviation of entire dorsal complex.  Eyes: Extraocular movements intact. Visual fields grossly normal.   Ears: Pinna are normal in shape and position. External canals are patent.  Oral Cavity/Oropharynx: Dentition is intact. Mucous membranes moist. No masses or lesions.  Respiratory: No respiratory distress. Quiet breathing without stertor or  stridor.  Cardiovascular: Regular rate and rhythm. Warm extremities with equal pulses.  Psych: Normal mood and affect. Judgement and insight appropriate.  Neuro: Alert and oriented. CN II-XII grossly intact. No focal deficits.  Musculoskeletal: Gait intact. Moves all extremities well without apparent deformities.    Plan:    Surgical intervention for closed nasal reduction is not warranted at this time.  6 MONTH waiting period; if having new breathing trouble at that time, start Flonase BID 2 sprays up and out for 4-6 weeks; if still experiencing nasal obstruction, patient to contact our office for followup evaluation.     Rachel Humphrey PA-C

## 2024-12-13 ENCOUNTER — HOME CARE VISIT (OUTPATIENT)
Dept: HOME HEALTH SERVICES | Facility: HOME HEALTH | Age: 84
End: 2024-12-13
Payer: MEDICARE

## 2024-12-13 VITALS
SYSTOLIC BLOOD PRESSURE: 134 MMHG | OXYGEN SATURATION: 100 % | RESPIRATION RATE: 18 BRPM | HEART RATE: 61 BPM | TEMPERATURE: 98 F | DIASTOLIC BLOOD PRESSURE: 68 MMHG

## 2024-12-13 PROCEDURE — G0299 HHS/HOSPICE OF RN EA 15 MIN: HCPCS | Mod: HHH

## 2024-12-14 ASSESSMENT — ENCOUNTER SYMPTOMS
DENIES PAIN: 1
CHANGE IN APPETITE: UNCHANGED
LAST BOWEL MOVEMENT: 67187
APPETITE LEVEL: GOOD
BOWEL PATTERN NORMAL: 1
SKIN LESIONS: 1
MUSCLE WEAKNESS: 1
PERSON REPORTING PAIN: PATIENT

## 2024-12-16 ENCOUNTER — HOME CARE VISIT (OUTPATIENT)
Dept: HOME HEALTH SERVICES | Facility: HOME HEALTH | Age: 84
End: 2024-12-16
Payer: MEDICARE

## 2024-12-16 VITALS
SYSTOLIC BLOOD PRESSURE: 126 MMHG | HEART RATE: 74 BPM | TEMPERATURE: 98.5 F | DIASTOLIC BLOOD PRESSURE: 56 MMHG | RESPIRATION RATE: 18 BRPM | OXYGEN SATURATION: 96 %

## 2024-12-16 PROCEDURE — G0299 HHS/HOSPICE OF RN EA 15 MIN: HCPCS | Mod: HHH

## 2024-12-16 ASSESSMENT — ENCOUNTER SYMPTOMS
DENIES PAIN: 1
CHANGE IN APPETITE: UNCHANGED
PERSON REPORTING PAIN: PATIENT
APPETITE LEVEL: GOOD

## 2024-12-17 ENCOUNTER — APPOINTMENT (OUTPATIENT)
Facility: CLINIC | Age: 84
End: 2024-12-17
Payer: MEDICARE

## 2024-12-17 DIAGNOSIS — S02.2XXA CLOSED FRACTURE OF NASAL BONE, INITIAL ENCOUNTER: ICD-10-CM

## 2024-12-17 DIAGNOSIS — S02.2XXD CLOSED FRACTURE OF NASAL BONE WITH ROUTINE HEALING, SUBSEQUENT ENCOUNTER: Primary | ICD-10-CM

## 2024-12-17 PROCEDURE — 99203 OFFICE O/P NEW LOW 30 MIN: CPT | Performed by: PHYSICIAN ASSISTANT

## 2024-12-17 PROCEDURE — 1159F MED LIST DOCD IN RCRD: CPT | Performed by: PHYSICIAN ASSISTANT

## 2024-12-17 NOTE — PATIENT INSTRUCTIONS
6 MONTH waiting period; if having new breathing trouble at that time, start Flonase 2 sprays up and out, twice a day for 4-6 weeks; if still experiencing nasal obstruction, patient to contact our office at [phone] for followup evaluation.

## 2024-12-18 ENCOUNTER — HOME CARE VISIT (OUTPATIENT)
Dept: HOME HEALTH SERVICES | Facility: HOME HEALTH | Age: 84
End: 2024-12-18
Payer: MEDICARE

## 2024-12-18 VITALS — OXYGEN SATURATION: 97 %

## 2024-12-18 PROCEDURE — G0157 HHC PT ASSISTANT EA 15: HCPCS | Mod: HHH

## 2024-12-18 PROCEDURE — G0152 HHCP-SERV OF OT,EA 15 MIN: HCPCS | Mod: HHH | Performed by: OCCUPATIONAL THERAPIST

## 2024-12-18 ASSESSMENT — ENCOUNTER SYMPTOMS
PERSON REPORTING PAIN: PATIENT
DENIES PAIN: 1
DENIES PAIN: 1

## 2024-12-24 ENCOUNTER — HOME CARE VISIT (OUTPATIENT)
Dept: HOME HEALTH SERVICES | Facility: HOME HEALTH | Age: 84
End: 2024-12-24
Payer: MEDICARE

## 2024-12-26 ENCOUNTER — HOME CARE VISIT (OUTPATIENT)
Dept: HOME HEALTH SERVICES | Facility: HOME HEALTH | Age: 84
End: 2024-12-26
Payer: MEDICARE

## 2024-12-26 PROCEDURE — G0158 HHC OT ASSISTANT EA 15: HCPCS | Mod: CO,HHH

## 2024-12-27 ASSESSMENT — ENCOUNTER SYMPTOMS
PERSON REPORTING PAIN: PATIENT
DENIES PAIN: 1

## 2025-01-02 ENCOUNTER — HOME CARE VISIT (OUTPATIENT)
Dept: HOME HEALTH SERVICES | Facility: HOME HEALTH | Age: 85
End: 2025-01-02
Payer: MEDICARE

## 2025-01-02 PROCEDURE — G0152 HHCP-SERV OF OT,EA 15 MIN: HCPCS | Mod: HHH | Performed by: OCCUPATIONAL THERAPIST

## 2025-01-03 ENCOUNTER — HOME CARE VISIT (OUTPATIENT)
Dept: HOME HEALTH SERVICES | Facility: HOME HEALTH | Age: 85
End: 2025-01-03
Payer: MEDICARE

## 2025-01-07 ENCOUNTER — HOME CARE VISIT (OUTPATIENT)
Dept: HOME HEALTH SERVICES | Facility: HOME HEALTH | Age: 85
End: 2025-01-07
Payer: MEDICARE

## 2025-01-07 VITALS — DIASTOLIC BLOOD PRESSURE: 70 MMHG | HEART RATE: 60 BPM | SYSTOLIC BLOOD PRESSURE: 102 MMHG | OXYGEN SATURATION: 100 %

## 2025-01-07 PROCEDURE — G0151 HHCP-SERV OF PT,EA 15 MIN: HCPCS | Mod: HHH

## 2025-01-07 ASSESSMENT — BALANCE ASSESSMENTS
BALANCE SCORE: 13
EYES CLOSED AT MAXIMUM POSITION NUDGED: 1 - STEADY
NUDGED SCORE: 2
ATTEMPTS TO ARISE: 2 - ABLE TO RISE, ONE ATTEMPT
SITTING BALANCE: 1 - STEADY, SAFE
SITTING DOWN: 1 - USES ARMS OR NOT SMOOTH MOTION
IMMEDIATE STANDING BALANCE FIRST 5 SECONDS: 2 - STEADY WITHOUT WALKER OR OTHER SUPPORT
NUDGED: 2 - STEADY
TURNING 360 DEGREES STEPS: 1 - CONTINUOUS STEPS
STANDING BALANCE: 1 - STEADY BUT WIDE STANCE AND USES CANE OR OTHER SUPPORT
ARISING SCORE: 1
ARISES: 1 - ABLE, USES ARMS TO HELP

## 2025-01-07 ASSESSMENT — GAIT ASSESSMENTS
GAIT SCORE: 7
STEP SYMMETRY: 1 - RIGHT AND LEFT STEP LENGTH APPEAR EQUAL
TRUNK: 0 - MARKED SWAY OR USES WALKING AID
STEP CONTINUITY: 1 - STEPS APPEAR CONTINUOUS
INITIATION OF GAIT IMMEDIATELY AFTER GO: 1 - NO HESITANCY
WALKING STANCE: 1 - HEELS ALMOST TOUCHING WHILE WALKING
PATH: 1 - MILD/MODERATE DEVIATION OR USES WALKING AID
PATH SCORE: 1
TRUNK SCORE: 0
BALANCE AND GAIT SCORE: 20

## 2025-01-07 ASSESSMENT — ACTIVITIES OF DAILY LIVING (ADL)
HOME_HEALTH_OASIS: 00
OASIS_M1830: 01

## 2025-01-07 ASSESSMENT — ENCOUNTER SYMPTOMS
PERSON REPORTING PAIN: PATIENT
DENIES PAIN: 1

## 2025-02-14 ENCOUNTER — APPOINTMENT (OUTPATIENT)
Dept: RADIOLOGY | Facility: HOSPITAL | Age: 85
End: 2025-02-14
Payer: MEDICARE

## 2025-02-14 ENCOUNTER — APPOINTMENT (OUTPATIENT)
Dept: RADIOLOGY | Facility: HOSPITAL | Age: 85
DRG: 493 | End: 2025-02-14
Payer: MEDICARE

## 2025-02-14 ENCOUNTER — HOSPITAL ENCOUNTER (EMERGENCY)
Facility: HOSPITAL | Age: 85
Discharge: ANOTHER HOSPITAL | End: 2025-02-14
Attending: STUDENT IN AN ORGANIZED HEALTH CARE EDUCATION/TRAINING PROGRAM
Payer: MEDICARE

## 2025-02-14 ENCOUNTER — HOSPITAL ENCOUNTER (INPATIENT)
Facility: HOSPITAL | Age: 85
DRG: 493 | End: 2025-02-14
Attending: EMERGENCY MEDICINE | Admitting: SURGERY
Payer: MEDICARE

## 2025-02-14 VITALS
HEART RATE: 72 BPM | TEMPERATURE: 97.9 F | BODY MASS INDEX: 38.64 KG/M2 | OXYGEN SATURATION: 99 % | HEIGHT: 62 IN | WEIGHT: 210 LBS | SYSTOLIC BLOOD PRESSURE: 98 MMHG | RESPIRATION RATE: 13 BRPM | DIASTOLIC BLOOD PRESSURE: 46 MMHG

## 2025-02-14 DIAGNOSIS — D64.9 ANEMIA, UNSPECIFIED TYPE: ICD-10-CM

## 2025-02-14 DIAGNOSIS — S42.321A CLOSED DISPLACED TRANSVERSE FRACTURE OF SHAFT OF RIGHT HUMERUS, INITIAL ENCOUNTER: ICD-10-CM

## 2025-02-14 DIAGNOSIS — S42.351A: ICD-10-CM

## 2025-02-14 DIAGNOSIS — F03.918 DEMENTIA WITH OTHER BEHAVIORAL DISTURBANCE, UNSPECIFIED DEMENTIA SEVERITY, UNSPECIFIED DEMENTIA TYPE: ICD-10-CM

## 2025-02-14 DIAGNOSIS — S42.321A DISPLACED TRANSVERSE FRACTURE OF SHAFT OF HUMERUS, RIGHT ARM, INITIAL ENCOUNTER FOR CLOSED FRACTURE: ICD-10-CM

## 2025-02-14 DIAGNOSIS — S22.081A T12 BURST FRACTURE (MULTI): ICD-10-CM

## 2025-02-14 DIAGNOSIS — S22.089A CLOSED FRACTURE OF TWELFTH THORACIC VERTEBRA, UNSPECIFIED FRACTURE MORPHOLOGY, INITIAL ENCOUNTER (MULTI): Primary | ICD-10-CM

## 2025-02-14 DIAGNOSIS — R29.6 FALL IN ELDERLY PATIENT: ICD-10-CM

## 2025-02-14 DIAGNOSIS — R55 SYNCOPE, NEAR: Primary | ICD-10-CM

## 2025-02-14 LAB
ABO GROUP (TYPE) IN BLOOD: NORMAL
ALBUMIN SERPL BCP-MCNC: 3.5 G/DL (ref 3.4–5)
ALP SERPL-CCNC: 74 U/L (ref 33–136)
ALT SERPL W P-5'-P-CCNC: 8 U/L (ref 7–45)
ANION GAP SERPL CALC-SCNC: 14 MMOL/L (ref 10–20)
ANTIBODY SCREEN: NORMAL
AST SERPL W P-5'-P-CCNC: 13 U/L (ref 9–39)
BASOPHILS # BLD AUTO: 0.03 X10*3/UL (ref 0–0.1)
BASOPHILS NFR BLD AUTO: 0.4 %
BILIRUB SERPL-MCNC: 0.7 MG/DL (ref 0–1.2)
BUN SERPL-MCNC: 38 MG/DL (ref 6–23)
CALCIUM SERPL-MCNC: 9.1 MG/DL (ref 8.6–10.3)
CHLORIDE SERPL-SCNC: 105 MMOL/L (ref 98–107)
CO2 SERPL-SCNC: 24 MMOL/L (ref 21–32)
CREAT SERPL-MCNC: 2.16 MG/DL (ref 0.5–1.05)
EGFRCR SERPLBLD CKD-EPI 2021: 22 ML/MIN/1.73M*2
EOSINOPHIL # BLD AUTO: 0.07 X10*3/UL (ref 0–0.4)
EOSINOPHIL NFR BLD AUTO: 0.9 %
ERYTHROCYTE [DISTWIDTH] IN BLOOD BY AUTOMATED COUNT: 14.7 % (ref 11.5–14.5)
ETHANOL SERPL-MCNC: <10 MG/DL
FIBRINOGEN PPP-MCNC: 443 MG/DL (ref 200–400)
GLUCOSE SERPL-MCNC: 118 MG/DL (ref 74–99)
HCT VFR BLD AUTO: 33.8 % (ref 36–46)
HGB BLD-MCNC: 10.4 G/DL (ref 12–16)
HOLD SPECIMEN: NORMAL
IMM GRANULOCYTES # BLD AUTO: 0.04 X10*3/UL (ref 0–0.5)
IMM GRANULOCYTES NFR BLD AUTO: 0.5 % (ref 0–0.9)
INR PPP: 1.3 (ref 0.9–1.1)
LACTATE SERPL-SCNC: 2 MMOL/L (ref 0.4–2)
LYMPHOCYTES # BLD AUTO: 1.09 X10*3/UL (ref 0.8–3)
LYMPHOCYTES NFR BLD AUTO: 14.5 %
MCH RBC QN AUTO: 29.7 PG (ref 26–34)
MCHC RBC AUTO-ENTMCNC: 30.8 G/DL (ref 32–36)
MCV RBC AUTO: 97 FL (ref 80–100)
MONOCYTES # BLD AUTO: 0.67 X10*3/UL (ref 0.05–0.8)
MONOCYTES NFR BLD AUTO: 8.9 %
NEUTROPHILS # BLD AUTO: 5.6 X10*3/UL (ref 1.6–5.5)
NEUTROPHILS NFR BLD AUTO: 74.8 %
NRBC BLD-RTO: 0 /100 WBCS (ref 0–0)
PLATELET # BLD AUTO: 223 X10*3/UL (ref 150–450)
POTASSIUM SERPL-SCNC: 4.9 MMOL/L (ref 3.5–5.3)
PROT SERPL-MCNC: 6.7 G/DL (ref 6.4–8.2)
PROTHROMBIN TIME: 14.2 SECONDS (ref 9.8–12.8)
RBC # BLD AUTO: 3.5 X10*6/UL (ref 4–5.2)
RH FACTOR (ANTIGEN D): NORMAL
SODIUM SERPL-SCNC: 138 MMOL/L (ref 136–145)
WBC # BLD AUTO: 7.5 X10*3/UL (ref 4.4–11.3)

## 2025-02-14 PROCEDURE — 96374 THER/PROPH/DIAG INJ IV PUSH: CPT | Mod: 59

## 2025-02-14 PROCEDURE — 71260 CT THORAX DX C+: CPT

## 2025-02-14 PROCEDURE — 72131 CT LUMBAR SPINE W/O DYE: CPT | Mod: RCN

## 2025-02-14 PROCEDURE — 96375 TX/PRO/DX INJ NEW DRUG ADDON: CPT | Mod: 59

## 2025-02-14 PROCEDURE — 72170 X-RAY EXAM OF PELVIS: CPT | Mod: FOREIGN READ | Performed by: RADIOLOGY

## 2025-02-14 PROCEDURE — 2550000001 HC RX 255 CONTRASTS: Performed by: STUDENT IN AN ORGANIZED HEALTH CARE EDUCATION/TRAINING PROGRAM

## 2025-02-14 PROCEDURE — 73070 X-RAY EXAM OF ELBOW: CPT | Mod: RT

## 2025-02-14 PROCEDURE — 73070 X-RAY EXAM OF ELBOW: CPT | Mod: RIGHT SIDE | Performed by: RADIOLOGY

## 2025-02-14 PROCEDURE — 85610 PROTHROMBIN TIME: CPT | Performed by: STUDENT IN AN ORGANIZED HEALTH CARE EDUCATION/TRAINING PROGRAM

## 2025-02-14 PROCEDURE — 72125 CT NECK SPINE W/O DYE: CPT

## 2025-02-14 PROCEDURE — 99285 EMERGENCY DEPT VISIT HI MDM: CPT | Mod: 25 | Performed by: STUDENT IN AN ORGANIZED HEALTH CARE EDUCATION/TRAINING PROGRAM

## 2025-02-14 PROCEDURE — 72125 CT NECK SPINE W/O DYE: CPT | Performed by: RADIOLOGY

## 2025-02-14 PROCEDURE — 2500000004 HC RX 250 GENERAL PHARMACY W/ HCPCS (ALT 636 FOR OP/ED)

## 2025-02-14 PROCEDURE — 71045 X-RAY EXAM CHEST 1 VIEW: CPT | Mod: FOREIGN READ | Performed by: RADIOLOGY

## 2025-02-14 PROCEDURE — 70450 CT HEAD/BRAIN W/O DYE: CPT | Performed by: RADIOLOGY

## 2025-02-14 PROCEDURE — 99152 MOD SED SAME PHYS/QHP 5/>YRS: CPT | Performed by: EMERGENCY MEDICINE

## 2025-02-14 PROCEDURE — 29105 APPLICATION LONG ARM SPLINT: CPT | Mod: RT

## 2025-02-14 PROCEDURE — 73060 X-RAY EXAM OF HUMERUS: CPT | Mod: RT,76

## 2025-02-14 PROCEDURE — 73060 X-RAY EXAM OF HUMERUS: CPT | Mod: RIGHT SIDE | Performed by: RADIOLOGY

## 2025-02-14 PROCEDURE — 76000 FLUOROSCOPY <1 HR PHYS/QHP: CPT

## 2025-02-14 PROCEDURE — 83880 ASSAY OF NATRIURETIC PEPTIDE: CPT

## 2025-02-14 PROCEDURE — 85025 COMPLETE CBC W/AUTO DIFF WBC: CPT | Performed by: STUDENT IN AN ORGANIZED HEALTH CARE EDUCATION/TRAINING PROGRAM

## 2025-02-14 PROCEDURE — 72132 CT LUMBAR SPINE W/DYE: CPT | Mod: RCN | Performed by: RADIOLOGY

## 2025-02-14 PROCEDURE — 70450 CT HEAD/BRAIN W/O DYE: CPT

## 2025-02-14 PROCEDURE — 96361 HYDRATE IV INFUSION ADD-ON: CPT | Mod: 59

## 2025-02-14 PROCEDURE — 2500000004 HC RX 250 GENERAL PHARMACY W/ HCPCS (ALT 636 FOR OP/ED): Performed by: STUDENT IN AN ORGANIZED HEALTH CARE EDUCATION/TRAINING PROGRAM

## 2025-02-14 PROCEDURE — 80053 COMPREHEN METABOLIC PANEL: CPT | Performed by: STUDENT IN AN ORGANIZED HEALTH CARE EDUCATION/TRAINING PROGRAM

## 2025-02-14 PROCEDURE — 74177 CT ABD & PELVIS W/CONTRAST: CPT | Mod: RCN | Performed by: RADIOLOGY

## 2025-02-14 PROCEDURE — 99285 EMERGENCY DEPT VISIT HI MDM: CPT | Mod: 25 | Performed by: EMERGENCY MEDICINE

## 2025-02-14 PROCEDURE — 96375 TX/PRO/DX INJ NEW DRUG ADDON: CPT

## 2025-02-14 PROCEDURE — 83605 ASSAY OF LACTIC ACID: CPT | Performed by: STUDENT IN AN ORGANIZED HEALTH CARE EDUCATION/TRAINING PROGRAM

## 2025-02-14 PROCEDURE — 85384 FIBRINOGEN ACTIVITY: CPT | Performed by: STUDENT IN AN ORGANIZED HEALTH CARE EDUCATION/TRAINING PROGRAM

## 2025-02-14 PROCEDURE — 82077 ASSAY SPEC XCP UR&BREATH IA: CPT | Performed by: STUDENT IN AN ORGANIZED HEALTH CARE EDUCATION/TRAINING PROGRAM

## 2025-02-14 PROCEDURE — 36415 COLL VENOUS BLD VENIPUNCTURE: CPT | Performed by: STUDENT IN AN ORGANIZED HEALTH CARE EDUCATION/TRAINING PROGRAM

## 2025-02-14 PROCEDURE — 2500000005 HC RX 250 GENERAL PHARMACY W/O HCPCS: Performed by: STUDENT IN AN ORGANIZED HEALTH CARE EDUCATION/TRAINING PROGRAM

## 2025-02-14 PROCEDURE — G0390 TRAUMA RESPONS W/HOSP CRITI: HCPCS

## 2025-02-14 PROCEDURE — 73202 CT UPPR EXTREMITY W/O&W/DYE: CPT | Mod: RIGHT SIDE | Performed by: RADIOLOGY

## 2025-02-14 PROCEDURE — 71045 X-RAY EXAM CHEST 1 VIEW: CPT

## 2025-02-14 PROCEDURE — 84484 ASSAY OF TROPONIN QUANT: CPT

## 2025-02-14 PROCEDURE — 73060 X-RAY EXAM OF HUMERUS: CPT | Mod: RIGHT SIDE | Performed by: SPECIALIST

## 2025-02-14 PROCEDURE — 73030 X-RAY EXAM OF SHOULDER: CPT | Mod: RT

## 2025-02-14 PROCEDURE — 73060 X-RAY EXAM OF HUMERUS: CPT | Mod: RT

## 2025-02-14 PROCEDURE — 84443 ASSAY THYROID STIM HORMONE: CPT | Performed by: INTERNAL MEDICINE

## 2025-02-14 PROCEDURE — 72170 X-RAY EXAM OF PELVIS: CPT

## 2025-02-14 PROCEDURE — 99285 EMERGENCY DEPT VISIT HI MDM: CPT | Performed by: STUDENT IN AN ORGANIZED HEALTH CARE EDUCATION/TRAINING PROGRAM

## 2025-02-14 PROCEDURE — 71260 CT THORAX DX C+: CPT | Mod: RCN | Performed by: RADIOLOGY

## 2025-02-14 PROCEDURE — 96374 THER/PROPH/DIAG INJ IV PUSH: CPT

## 2025-02-14 PROCEDURE — 86901 BLOOD TYPING SEROLOGIC RH(D): CPT | Performed by: STUDENT IN AN ORGANIZED HEALTH CARE EDUCATION/TRAINING PROGRAM

## 2025-02-14 PROCEDURE — 72129 CT CHEST SPINE W/DYE: CPT | Mod: RCN | Performed by: RADIOLOGY

## 2025-02-14 PROCEDURE — 73206 CT ANGIO UPR EXTRM W/O&W/DYE: CPT | Mod: RT

## 2025-02-14 PROCEDURE — 72128 CT CHEST SPINE W/O DYE: CPT | Mod: RCN

## 2025-02-14 PROCEDURE — 73030 X-RAY EXAM OF SHOULDER: CPT | Mod: RIGHT SIDE | Performed by: RADIOLOGY

## 2025-02-14 RX ORDER — MIDAZOLAM HYDROCHLORIDE 1 MG/ML
1 INJECTION INTRAMUSCULAR; INTRAVENOUS ONCE
Status: COMPLETED | OUTPATIENT
Start: 2025-02-14 | End: 2025-02-14

## 2025-02-14 RX ORDER — ONDANSETRON HYDROCHLORIDE 2 MG/ML
INJECTION, SOLUTION INTRAVENOUS
Status: COMPLETED
Start: 2025-02-14 | End: 2025-02-14

## 2025-02-14 RX ORDER — ONDANSETRON HYDROCHLORIDE 2 MG/ML
4 INJECTION, SOLUTION INTRAVENOUS ONCE
Status: COMPLETED | OUTPATIENT
Start: 2025-02-14 | End: 2025-02-14

## 2025-02-14 RX ORDER — MORPHINE SULFATE 4 MG/ML
4 INJECTION INTRAVENOUS ONCE
Status: DISCONTINUED | OUTPATIENT
Start: 2025-02-14 | End: 2025-02-14

## 2025-02-14 RX ORDER — HYDROMORPHONE HYDROCHLORIDE 1 MG/ML
1 INJECTION, SOLUTION INTRAMUSCULAR; INTRAVENOUS; SUBCUTANEOUS ONCE
Status: COMPLETED | OUTPATIENT
Start: 2025-02-14 | End: 2025-02-14

## 2025-02-14 RX ORDER — HYDROMORPHONE HYDROCHLORIDE 1 MG/ML
INJECTION, SOLUTION INTRAMUSCULAR; INTRAVENOUS; SUBCUTANEOUS
Status: COMPLETED
Start: 2025-02-14 | End: 2025-02-14

## 2025-02-14 RX ADMIN — HYDROMORPHONE HYDROCHLORIDE 1 MG: 1 INJECTION, SOLUTION INTRAMUSCULAR; INTRAVENOUS; SUBCUTANEOUS at 17:59

## 2025-02-14 RX ADMIN — Medication 2 L/MIN: at 19:54

## 2025-02-14 RX ADMIN — ONDANSETRON 4 MG: 2 INJECTION INTRAMUSCULAR; INTRAVENOUS at 23:25

## 2025-02-14 RX ADMIN — IOHEXOL 100 ML: 350 INJECTION, SOLUTION INTRAVENOUS at 18:31

## 2025-02-14 RX ADMIN — HYDROMORPHONE HYDROCHLORIDE 0.5 MG: 0.5 INJECTION, SOLUTION INTRAMUSCULAR; INTRAVENOUS; SUBCUTANEOUS at 23:25

## 2025-02-14 RX ADMIN — ONDANSETRON 4 MG: 2 INJECTION INTRAMUSCULAR; INTRAVENOUS at 17:59

## 2025-02-14 RX ADMIN — ONDANSETRON HYDROCHLORIDE 4 MG: 2 INJECTION, SOLUTION INTRAVENOUS at 17:59

## 2025-02-14 RX ADMIN — Medication 2 L/MIN: at 19:55

## 2025-02-14 RX ADMIN — MIDAZOLAM HYDROCHLORIDE 1 MG: 1 INJECTION, SOLUTION INTRAMUSCULAR; INTRAVENOUS at 23:25

## 2025-02-14 RX ADMIN — SODIUM CHLORIDE 1000 ML: 9 INJECTION, SOLUTION INTRAVENOUS at 18:37

## 2025-02-14 ASSESSMENT — PAIN DESCRIPTION - PROGRESSION: CLINICAL_PROGRESSION: NOT CHANGED

## 2025-02-14 ASSESSMENT — LIFESTYLE VARIABLES
HAVE PEOPLE ANNOYED YOU BY CRITICIZING YOUR DRINKING: NO
TOTAL SCORE: 0
EVER HAD A DRINK FIRST THING IN THE MORNING TO STEADY YOUR NERVES TO GET RID OF A HANGOVER: NO
EVER FELT BAD OR GUILTY ABOUT YOUR DRINKING: NO
HAVE YOU EVER FELT YOU SHOULD CUT DOWN ON YOUR DRINKING: NO

## 2025-02-14 ASSESSMENT — COLUMBIA-SUICIDE SEVERITY RATING SCALE - C-SSRS
1. IN THE PAST MONTH, HAVE YOU WISHED YOU WERE DEAD OR WISHED YOU COULD GO TO SLEEP AND NOT WAKE UP?: NO
6. HAVE YOU EVER DONE ANYTHING, STARTED TO DO ANYTHING, OR PREPARED TO DO ANYTHING TO END YOUR LIFE?: NO
2. HAVE YOU ACTUALLY HAD ANY THOUGHTS OF KILLING YOURSELF?: NO

## 2025-02-14 ASSESSMENT — PAIN - FUNCTIONAL ASSESSMENT: PAIN_FUNCTIONAL_ASSESSMENT: 0-10

## 2025-02-14 ASSESSMENT — PAIN SCALES - GENERAL
PAINLEVEL_OUTOF10: 10 - WORST POSSIBLE PAIN

## 2025-02-14 ASSESSMENT — PAIN DESCRIPTION - LOCATION: LOCATION: ARM

## 2025-02-14 NOTE — ED PROVIDER NOTES
EMERGENCY DEPARTMENT ENCOUNTER      Pt Name: Lyndsey Rosado  MRN: [id number]  Birthdate 1940  Date of evaluation: 2/14/2025  Provider: Elian Schmitt MD    CHIEF COMPLAINT       Chief Complaint   Patient presents with    Fall     Pt from \[facility]\"", unwitnessed fall onto coffee table. Pt arrived with r armn in splint with obvious deformity, thready radial pulse noted. Complains of 0 neck and back pain. EMS reports of possible syncope episode while talking to her. Pt is diabetic with sugar of 121.     HISTORY OF PRESENT ILLNESS    Lyndsey Rosado is a 84 y.o. year old female who presents to the ER for a fall.  The patient was using the bathroom, after she used the bathroom she walked across her apartment, got dizzy and fell backwards onto a chair.  She fell on her right arm, she broke her chair. Patient is not on any thinners. She has a history of prior humerus repair with hardware.     MH is significant for hypertension, hyperlipidemia, dementia T2DM, CKD III, Anemia, Hypothyroidism, HTN, HLD, osteoporosis c/b compression fractures.     PAST MEDICAL HISTORY     Past Medical History:   Diagnosis Date    Anemia     Arthritis     Diabetes mellitus (Multi)     Disease of thyroid gland     Diverticulitis     [place] (hard of hearing)     Hyperlipidemia     Hypertension     Hypothyroid     Renal disorder      CURRENT MEDICATIONS       Previous Medications    ATORVASTATIN (LIPITOR) 10 MG TABLET    Take 1 tablet (10 mg) by mouth once daily.    CALCIUM CARBONATE-VITAMIN D3 600 MG-20 MCG (800 UNIT) TABLET,CHEWABLE    Chew 1 tablet once daily.    LEVOTHYROXINE (TIROSINT) 88 MCG CAPSULE    Take 1 capsule (88 mcg) by mouth once daily in the morning. Take before meals.    OLMESARTAN (BENICAR) 20 MG TABLET    Take 1 tablet (20 mg) by mouth once daily.    PIOGLITAZONE (ACTOS) 15 MG TABLET    Take 1 tablet (15 mg) by mouth once daily.     SURGICAL HISTORY     History reviewed. No pertinent surgical history.  ALLERGIES      Penicillin g and Penicillins  FAMILY HISTORY     No family history on file.  SOCIAL HISTORY       Social History     Tobacco Use    Smoking status: Never    Smokeless tobacco: Never   Substance Use Topics    Alcohol use: Never    Drug use: Not on file     PHYSICAL EXAM  (up to 7 for level 4, 8 or more for level 5)     ED Triage Vitals   Temperature Heart Rate Respirations BP   02/14/25 1746 02/14/25 1746 02/14/25 1746 02/14/25 1746   36.7 °C (98.1 °F) 86 16 142/64      Pulse Ox Temp src Heart Rate Source Patient Position   02/14/25 1746 -- 02/14/25 1746 02/14/25 1820   99 %  Monitor Lying      BP Location FiO2 (%)     02/14/25 1746 --     Left arm        Physical Exam  HENT:      Head: Normocephalic and atraumatic.      Mouth/Throat:      Mouth: Mucous membranes are moist.   Cardiovascular:      Rate and Rhythm: Tachycardia present.   Pulmonary:      Effort: No respiratory distress.      Breath sounds: No stridor. No wheezing or rales.   Chest:      Chest wall: No tenderness.   Abdominal:      General: Abdomen is flat. There is no distension.      Palpations: Abdomen is soft. There is no mass.      Tenderness: There is no abdominal tenderness.   Musculoskeletal:      Right upper arm: Swelling, edema, deformity, tenderness and bony tenderness present.      Right elbow: Normal.   Skin:     General: Skin is warm and dry.      Capillary Refill: Capillary refill takes less than 2 seconds.   Neurological:      Mental Status: She is alert. Mental status is at baseline.        DIAGNOSTIC RESULTS   LABS:  Labs Reviewed   CBC WITH AUTO DIFFERENTIAL - Abnormal       Result Value    WBC 7.5      nRBC 0.0      RBC 3.50 (*)     Hemoglobin 10.4 (*)     Hematocrit 33.8 (*)     MCV 97      MCH 29.7      MCHC 30.8 (*)     RDW 14.7 (*)     Platelets 223      Neutrophils % 74.8      Immature Granulocytes %, Automated 0.5      Lymphocytes % 14.5      Monocytes % 8.9      Eosinophils % 0.9      Basophils % 0.4      Neutrophils Absolute  5.60 (*)     Immature Granulocytes Absolute, Automated 0.04      Lymphocytes Absolute 1.09      Monocytes Absolute 0.67      Eosinophils Absolute 0.07      Basophils Absolute 0.03     COMPREHENSIVE METABOLIC PANEL - Abnormal    Glucose 118 (*)     Sodium 138      Potassium 4.9      Chloride 105      Bicarbonate 24      Anion Gap 14      Urea Nitrogen 38 (*)     Creatinine 2.16 (*)     eGFR 22 (*)     Calcium 9.1      Albumin 3.5      Alkaline Phosphatase 74      Total Protein 6.7      AST 13      Bilirubin, Total 0.7      ALT 8     PROTIME-INR - Abnormal    Protime 14.2 (*)     INR 1.3 (*)    FIBRINOGEN - Abnormal    Fibrinogen 443 (*)    ALCOHOL - Normal    Alcohol <10     LACTATE - Normal    Lactate 2.0      Narrative:     Venipuncture immediately after or during the administration of Metamizole may lead to falsely low results. Testing should be performed immediately prior to Metamizole dosing.   TYPE AND SCREEN    ABO TYPE A      Rh TYPE POS      ANTIBODY SCREEN NEG       All other labs were within normal range or not returned as of this dictation.  Imaging  XR humerus right   Final Result   1.  There is a displaced fracture through the distal two thirds right   humerus.   Signed by Leandro Britton MD      CT angio upper extremity right w and or wo IV contrast   Final Result   Overall suboptimal evaluation of the arteries due to contrast bolus   timing and streak artifact from the patient's orthopedic hardware and   metallic objects overlying the patient's skin.  The radial artery does   opacify down to the palmar arch.  The remaining arteries are poorly   imaged at the level of the orthopedic hardware and the fracture site   in particular.  Injury to the brachial artery cannot be excluded on   this exam.  If desired a more detailed evaluation could likely be   obtained with Doppler ultrasound.   No evidence of arterial extravasation or large hematoma on this exam.   Signed by Joaquin Keller MD      CT chest  abdomen pelvis w IV contrast   Final Result   CHEST:   1.  No evidence of a pneumothorax.   2.  No acute rib fracture.   3.  Patchy nodular opacities in the right upper lobe, suspicious for   pneumonitis.   THORACIC SPINE:   1.  Acute compression fracture of T12, with 4 mm retropulsion of the   posterior superior aspect of the body into the thecal sac.   2.  Spondylosis deformans.   ABDOMEN/PELVIS:   1.  No evidence of a visceral injury.   LUMBAR SPINE:    1.  No compression deformities or spondylolisthesis noted.   2.  Discogenic disease at L5-S1.   Signed by Corky Turner MD      CT thoracic spine wo IV contrast   Final Result   CHEST:   1.  No evidence of a pneumothorax.   2.  No acute rib fracture.   3.  Patchy nodular opacities in the right upper lobe, suspicious for   pneumonitis.   THORACIC SPINE:   1.  Acute compression fracture of T12, with 4 mm retropulsion of the   posterior superior aspect of the body into the thecal sac.   2.  Spondylosis deformans.   ABDOMEN/PELVIS:   1.  No evidence of a visceral injury.   LUMBAR SPINE:    1.  No compression deformities or spondylolisthesis noted.   2.  Discogenic disease at L5-S1.   Signed by Corky Turner MD      CT lumbar spine wo IV contrast   Final Result   CHEST:   1.  No evidence of a pneumothorax.   2.  No acute rib fracture.   3.  Patchy nodular opacities in the right upper lobe, suspicious for   pneumonitis.   THORACIC SPINE:   1.  Acute compression fracture of T12, with 4 mm retropulsion of the   posterior superior aspect of the body into the thecal sac.   2.  Spondylosis deformans.   ABDOMEN/PELVIS:   1.  No evidence of a visceral injury.   LUMBAR SPINE:    1.  No compression deformities or spondylolisthesis noted.   2.  Discogenic disease at L5-S1.   Signed by Corky Turner MD      CT head W O contrast trauma protocol   Final Result   No CT evidence for acute intracranial pathology. Stable appearing   examination.        Signed by: Floyd Wheeler  2/14/2025 6:54 PM   Dictation workstation:   [id number]      CT cervical spine wo IV contrast   Final Result   1. No acute fracture or traumatic malalignment.   2. Chronic degenerative changes, not significantly changed from the   prior study.             Signed by: Floyd Wheeler 2/14/2025 7:02 PM   Dictation workstation:   [id number]      XR chest 1 view   Final Result   No acute radiographic chest finding.   Signed by Johsnon Torres MD      XR pelvis 1-2 views   Final Result   No acute bony abnormalities are identified in the AP view of the   pelvis..   Signed by Bola Zarate MD      XR humerus right   Final Result   Significantly displaced fracture of the right humerus diaphysis at the   distal edge of the plate and screw fixation hardware.   Signed by Benji Maguire,          Procedure  Procedures  EMERGENCY DEPARTMENT COURSE/MDM:   Medical Decision Making    Vitals:    Vitals:    02/14/25 1930 02/14/25 1945 02/14/25 2000 02/14/25 2015   BP: 106/53 109/50 107/53 106/51   BP Location:       Patient Position:       Pulse: 82 83 80 77   Resp: 11 12 10 17   Temp:   36.6 °C (97.9 °F)    TempSrc:   Temporal    SpO2: 96% (!) 84% 100% 99%   Weight:       Height:         Lyndsey Rosado is a female 84 y.o. who presents to the ER for a fall. On arrival the patients vital signs were: Afebrile, Tachycardic, Hypertensive, and Normoxic on room air. History obtained from: patient and Son.     There is an obvious deformity of the right humerus, skin tenting in the center of the humerus.  The fingertips are cool to palpation of the right hand compared to the left hand.  Radial pulse on the right was 1+ on initial assessment.  Radial pulse on the left was 2+ on assessment.  Doppler pulses were present in the radial and the ulnar arteries in the right arm.  Patient is able to wiggle her fingers, extend her fingers however limited by pain.  Low suspicion for wrist drop. The head is atraumatic, range of motion was  within normal limits, no chest wall tenderness, the abdomen is soft and nontender.  No obvious ecchymosis or lacerations on the body.  No active areas of bleeding.     Patient was given 1 mg of Dilaudid and Zofran for pain, she was given a liter of normal saline.    X-ray of the right humerus showed a significantly displaced fracture of the right humerus diaphysis at the  distal edge of the plate and screw fixation hardware.  X-ray of the chest did not show any acute fractures of the ribs, no acute cardiopulmonary processes.  X-ray of the pelvis showed that the pelvic ring is intact. There is no acute fracture.  CT of the head did not show any acute intracranial hemorrhage or stroke, showed possible otitis media.  CT of the cervical spine did not show any acute fracture or traumatic malalignment.  CT of the chest abdomen and pelvis showed No evidence of a pneumothorax. No acute rib fracture. Patchy nodular opacities in the right upper lobe, suspicious for pneumonitis.  THORACIC SPINE:  1.  Acute compression fracture of T12, with 4 mm retropulsion of the  posterior superior aspect of the body into the thecal sac.  ABDOMEN/PELVIS:  1.  No evidence of a visceral injury.  LUMBAR SPINE:   1.  No compression deformities or spondylolisthesis noted.  2.  Discogenic disease at L5-S1.      The orthopedic surgeon was consulted, recommended splinting her arm with a sugar-tong and transferring downtown for further management of her fracture due to her previous history of repair with hardware.  Trauma surgery was consulted, due to the patient not having neurological symptoms, loss of consciousness, atraumatic head, trauma surgery deferred the patient's management to orthopedic surgery.  Neurosurgery was consulted after the acute thoracic spine fracture was found.  He did not recommend any emergent surgery, he reported that the fracture will heal on its own, should he recommended a TLSO brace if needed for patient comfort.    Her  arm was splinted with a sugar-tong.  She tolerated it.  The patient is currently sleeping, her vitals are stable.  On reassessment, the patient reported that her pain has been controlled.  Her pulses are 2+ bilaterally, fingers are warm, due to her exam, consultation to vascular surgery was deferred.    [place] [facility] [place] and oxygen for orthopedic repair of her fractured humerus.  The patient was not started on antibiotics because the fracture is closed.    Patient was signed out to Dr. Ho at 8:45 PM pending completion of their work-up.  Please see the next provider's transition of care note for the remainder of the patient's care.       ED Course as of 02/14/25 2048 Fri Feb 14, 2025 1745 Son Doin Rosado  [phone]  Given an update  [FN]   1830 Discussed with orthopedics recommend sugar-tong of the upper arm.  [FN]   1831 SHEILA.  Will give IV fluids [FN]   1831 XR humerus right  On my interpretation x-ray of the humerus shows a displaced humeral shaft Fracture [FN]   1832 CT head W O contrast trauma protocol  On my interpretation CT head shows no intracranial bleeding [FN]   1837 Our Ortho Dr. Chen spoke [place] Connors [place] [facility] [place]. There is cf nerve injury so will need repair urgently   [place] [facility] [place] [FN]   1931 CT lumbar spine wo IV contrast [GV]   1948 Patient did have some mild hypoxia after receiving Dilaudid for pain control.  She is placed on 2 L nasal cannula [FN]   1955 Patient has radial pulse 1+ after receiving [FN]   2014 Discussed with neurosurgery.  No acute intervention required for T12 fracture.  Can give a brace for comfort.  This is a stable fracture pattern. [FN]   2043 Patient sonaditi Marion was updated on her transfer and plan for surgery. [GV]      ED Course User Index  [FN] Irais Gant MD  [GV] Elian Schmitt MD         Diagnoses as of 02/14/25 2048   Closed fracture of twelfth thoracic vertebra, unspecified fracture morphology,  initial encounter (Multi)   Closed displaced transverse fracture of shaft of right humerus, initial encounter       Consultant discussions: Trauma surgery was consulted, Dr. Carvalho deferred the patient's management to orthopedics because she did not have loss of consciousness, did not hit her head, did not have any focal deficits.  Orthopedics was consulted, recommended putting the patient in a sugar-tong with her elbow at 90 degrees.  Recommended [place] [facility] [place]    External Records Reviewed: I reviewed recent and relevant outside records including inpatient notes, outpatient records      Shared decision making for disposition  Patient and/or patient´s representative was counseled regarding labs, imaging, likely diagnosis. All questions were answered. Recommendation was made   for Transfer given the need for further escalation of care to inpatient management. Patient agreed and was transferred in stable condition. Accepting team was notified of any pending labs or imaging to ensure continuity of care.     ED Medications administered this visit:    Medications   oxygen (O2) therapy (2 L/min inhalation Rate Verify Medical Gas 2/14/25 1955)   HYDROmorphone (Dilaudid) injection 1 mg (1 mg intravenous Given 2/14/25 1759)   ondansetron (Zofran) injection 4 mg (4 mg intravenous Given 2/14/25 1759)   iohexol (OMNIPaque) 350 mg iodine/mL solution 100 mL (100 mL intravenous Given 2/14/25 1831)   sodium chloride 0.9 % bolus 1,000 mL (1,000 mL intravenous New Bag 2/14/25 1837)       New Prescriptions from this visit:    New Prescriptions    No medications on file       Follow-up:  No follow-up provider specified.      Final Impression:   1. Closed fracture of twelfth thoracic vertebra, unspecified fracture morphology, initial encounter (Multi)    2. Closed displaced transverse fracture of shaft of right humerus, initial encounter          Please excuse any misspellings or unintended errors related to the  Dragon speech recognition software used to dictate this note.    I reviewed the case with the attending ED physician. The attending ED physician agrees with the plan.      Elian Schmitt MD  Resident  02/14/25 2040     The attending ED physician agrees with the plan.      Elian Schmitt MD  Resident  02/14/25 2047       Elian Schmitt MD  Resident  02/18/25 0805

## 2025-02-15 ENCOUNTER — APPOINTMENT (OUTPATIENT)
Dept: CARDIOLOGY | Facility: HOSPITAL | Age: 85
DRG: 493 | End: 2025-02-15
Payer: MEDICARE

## 2025-02-15 ENCOUNTER — APPOINTMENT (OUTPATIENT)
Dept: RADIOLOGY | Facility: HOSPITAL | Age: 85
DRG: 493 | End: 2025-02-15
Payer: MEDICARE

## 2025-02-15 ENCOUNTER — CLINICAL SUPPORT (OUTPATIENT)
Dept: EMERGENCY MEDICINE | Facility: HOSPITAL | Age: 85
DRG: 493 | End: 2025-02-15
Payer: MEDICARE

## 2025-02-15 ENCOUNTER — ANESTHESIA (OUTPATIENT)
Dept: OPERATING ROOM | Facility: HOSPITAL | Age: 85
End: 2025-02-15
Payer: MEDICARE

## 2025-02-15 ENCOUNTER — ANESTHESIA EVENT (OUTPATIENT)
Dept: OPERATING ROOM | Facility: HOSPITAL | Age: 85
End: 2025-02-15
Payer: MEDICARE

## 2025-02-15 LAB
ABO GROUP (TYPE) IN BLOOD: NORMAL
ABO GROUP (TYPE) IN BLOOD: NORMAL
ANTIBODY SCREEN: NORMAL
ATRIAL RATE: 67 BPM
BNP SERPL-MCNC: 42 PG/ML (ref 0–99)
CARDIAC TROPONIN I PNL SERPL HS: 5 NG/L (ref 0–13)
GLUCOSE BLD MANUAL STRIP-MCNC: 101 MG/DL (ref 74–99)
GLUCOSE BLD MANUAL STRIP-MCNC: 127 MG/DL (ref 74–99)
GLUCOSE BLD MANUAL STRIP-MCNC: 135 MG/DL (ref 74–99)
GLUCOSE BLD MANUAL STRIP-MCNC: 73 MG/DL (ref 74–99)
P AXIS: 73 DEGREES
P OFFSET: 195 MS
P ONSET: 133 MS
PR INTERVAL: 172 MS
Q ONSET: 219 MS
QRS COUNT: 11 BEATS
QRS DURATION: 122 MS
QT INTERVAL: 460 MS
QTC CALCULATION(BAZETT): 486 MS
QTC FREDERICIA: 477 MS
R AXIS: 34 DEGREES
RH FACTOR (ANTIGEN D): NORMAL
RH FACTOR (ANTIGEN D): NORMAL
T AXIS: 1 DEGREES
T OFFSET: 449 MS
VENTRICULAR RATE: 67 BPM

## 2025-02-15 PROCEDURE — 99153 MOD SED SAME PHYS/QHP EA: CPT

## 2025-02-15 PROCEDURE — 2500000001 HC RX 250 WO HCPCS SELF ADMINISTERED DRUGS (ALT 637 FOR MEDICARE OP)

## 2025-02-15 PROCEDURE — C1769 GUIDE WIRE: HCPCS | Performed by: ORTHOPAEDIC SURGERY

## 2025-02-15 PROCEDURE — 82947 ASSAY GLUCOSE BLOOD QUANT: CPT

## 2025-02-15 PROCEDURE — 2500000005 HC RX 250 GENERAL PHARMACY W/O HCPCS: Performed by: ORTHOPAEDIC SURGERY

## 2025-02-15 PROCEDURE — 2500000004 HC RX 250 GENERAL PHARMACY W/ HCPCS (ALT 636 FOR OP/ED): Mod: JZ,TB | Performed by: NURSE ANESTHETIST, CERTIFIED REGISTERED

## 2025-02-15 PROCEDURE — 73060 X-RAY EXAM OF HUMERUS: CPT | Mod: RIGHT SIDE | Performed by: RADIOLOGY

## 2025-02-15 PROCEDURE — 2500000005 HC RX 250 GENERAL PHARMACY W/O HCPCS: Performed by: NURSE ANESTHETIST, CERTIFIED REGISTERED

## 2025-02-15 PROCEDURE — 2500000004 HC RX 250 GENERAL PHARMACY W/ HCPCS (ALT 636 FOR OP/ED): Performed by: ORTHOPAEDIC SURGERY

## 2025-02-15 PROCEDURE — 0PSF06Z REPOSITION RIGHT HUMERAL SHAFT WITH INTRAMEDULLARY INTERNAL FIXATION DEVICE, OPEN APPROACH: ICD-10-PCS | Performed by: STUDENT IN AN ORGANIZED HEALTH CARE EDUCATION/TRAINING PROGRAM

## 2025-02-15 PROCEDURE — C1713 ANCHOR/SCREW BN/BN,TIS/BN: HCPCS | Performed by: ORTHOPAEDIC SURGERY

## 2025-02-15 PROCEDURE — 36415 COLL VENOUS BLD VENIPUNCTURE: CPT

## 2025-02-15 PROCEDURE — 2500000004 HC RX 250 GENERAL PHARMACY W/ HCPCS (ALT 636 FOR OP/ED)

## 2025-02-15 PROCEDURE — 72100 X-RAY EXAM L-S SPINE 2/3 VWS: CPT

## 2025-02-15 PROCEDURE — 72070 X-RAY EXAM THORAC SPINE 2VWS: CPT | Mod: FOREIGN READ | Performed by: RADIOLOGY

## 2025-02-15 PROCEDURE — 73060 X-RAY EXAM OF HUMERUS: CPT | Mod: RT

## 2025-02-15 PROCEDURE — 2500000005 HC RX 250 GENERAL PHARMACY W/O HCPCS

## 2025-02-15 PROCEDURE — 96376 TX/PRO/DX INJ SAME DRUG ADON: CPT | Mod: 59

## 2025-02-15 PROCEDURE — P9045 ALBUMIN (HUMAN), 5%, 250 ML: HCPCS | Mod: JZ,TB | Performed by: NURSE ANESTHETIST, CERTIFIED REGISTERED

## 2025-02-15 PROCEDURE — 1200000002 HC GENERAL ROOM WITH TELEMETRY DAILY

## 2025-02-15 PROCEDURE — 2500000004 HC RX 250 GENERAL PHARMACY W/ HCPCS (ALT 636 FOR OP/ED): Performed by: EMERGENCY MEDICINE

## 2025-02-15 PROCEDURE — 93005 ELECTROCARDIOGRAM TRACING: CPT

## 2025-02-15 PROCEDURE — 2720000007 HC OR 272 NO HCPCS: Performed by: ORTHOPAEDIC SURGERY

## 2025-02-15 PROCEDURE — 29105 APPLICATION LONG ARM SPLINT: CPT

## 2025-02-15 PROCEDURE — 0RPL0JZ REMOVAL OF SYNTHETIC SUBSTITUTE FROM RIGHT ELBOW JOINT, OPEN APPROACH: ICD-10-PCS | Performed by: STUDENT IN AN ORGANIZED HEALTH CARE EDUCATION/TRAINING PROGRAM

## 2025-02-15 PROCEDURE — 24535 CLTX SPRCNDYLR HUMERAL FX W/: CPT | Mod: RT

## 2025-02-15 PROCEDURE — 3700000001 HC GENERAL ANESTHESIA TIME - INITIAL BASE CHARGE: Performed by: ORTHOPAEDIC SURGERY

## 2025-02-15 PROCEDURE — 86901 BLOOD TYPING SEROLOGIC RH(D): CPT

## 2025-02-15 PROCEDURE — A6213 FOAM DRG >16<=48 SQ IN W/BDR: HCPCS | Performed by: ORTHOPAEDIC SURGERY

## 2025-02-15 PROCEDURE — 7100000001 HC RECOVERY ROOM TIME - INITIAL BASE CHARGE: Performed by: ORTHOPAEDIC SURGERY

## 2025-02-15 PROCEDURE — 3700000002 HC GENERAL ANESTHESIA TIME - EACH INCREMENTAL 1 MINUTE: Performed by: ORTHOPAEDIC SURGERY

## 2025-02-15 PROCEDURE — 3600000009 HC OR TIME - EACH INCREMENTAL 1 MINUTE - PROCEDURE LEVEL FOUR: Performed by: ORTHOPAEDIC SURGERY

## 2025-02-15 PROCEDURE — 72100 X-RAY EXAM L-S SPINE 2/3 VWS: CPT | Mod: FOREIGN READ | Performed by: RADIOLOGY

## 2025-02-15 PROCEDURE — 3600000004 HC OR TIME - INITIAL BASE CHARGE - PROCEDURE LEVEL FOUR: Performed by: ORTHOPAEDIC SURGERY

## 2025-02-15 PROCEDURE — 2780000003 HC OR 278 NO HCPCS: Performed by: ORTHOPAEDIC SURGERY

## 2025-02-15 PROCEDURE — 7100000002 HC RECOVERY ROOM TIME - EACH INCREMENTAL 1 MINUTE: Performed by: ORTHOPAEDIC SURGERY

## 2025-02-15 PROCEDURE — 2500000004 HC RX 250 GENERAL PHARMACY W/ HCPCS (ALT 636 FOR OP/ED): Mod: JZ,TB | Performed by: STUDENT IN AN ORGANIZED HEALTH CARE EDUCATION/TRAINING PROGRAM

## 2025-02-15 PROCEDURE — 72070 X-RAY EXAM THORAC SPINE 2VWS: CPT

## 2025-02-15 DEVICE — K-WIRE, STERILE: Type: IMPLANTABLE DEVICE | Site: ARM | Status: NON-FUNCTIONAL

## 2025-02-15 DEVICE — IMPLANTABLE DEVICE: Type: IMPLANTABLE DEVICE | Site: ARM | Status: NON-FUNCTIONAL

## 2025-02-15 DEVICE — NAIL, HUMERAL, AEQUALIS, PROXIMAL SCREW, 5 X 36MM: Type: IMPLANTABLE DEVICE | Site: ARM | Status: FUNCTIONAL

## 2025-02-15 DEVICE — IMPLANTABLE DEVICE
Type: IMPLANTABLE DEVICE | Site: ARM | Status: FUNCTIONAL
Brand: TORNIER LONG HUMERAL NAIL

## 2025-02-15 DEVICE — NAIL, HUMERAL, AEQUALIS, PROXIMAL SCREW, 5 X 40MM: Type: IMPLANTABLE DEVICE | Site: ARM | Status: FUNCTIONAL

## 2025-02-15 DEVICE — IMPLANTABLE DEVICE: Type: IMPLANTABLE DEVICE | Site: ARM | Status: FUNCTIONAL

## 2025-02-15 DEVICE — NAIL, HUMERAL, AEQUALIS, DISTAL SCREW, DIA 4, 3MM X 22MM, LONG: Type: IMPLANTABLE DEVICE | Site: ARM | Status: FUNCTIONAL

## 2025-02-15 RX ORDER — HYDROMORPHONE HYDROCHLORIDE 1 MG/ML
INJECTION, SOLUTION INTRAMUSCULAR; INTRAVENOUS; SUBCUTANEOUS AS NEEDED
Status: DISCONTINUED | OUTPATIENT
Start: 2025-02-15 | End: 2025-02-15

## 2025-02-15 RX ORDER — ACETAMINOPHEN 325 MG/1
975 TABLET ORAL EVERY 8 HOURS SCHEDULED
Status: DISCONTINUED | OUTPATIENT
Start: 2025-02-15 | End: 2025-02-18 | Stop reason: HOSPADM

## 2025-02-15 RX ORDER — PROPOFOL 10 MG/ML
50 INJECTION, EMULSION INTRAVENOUS ONCE
Status: COMPLETED | OUTPATIENT
Start: 2025-02-15 | End: 2025-02-15

## 2025-02-15 RX ORDER — SODIUM CHLORIDE 0.9 G/100ML
INJECTION, SOLUTION IRRIGATION AS NEEDED
Status: DISCONTINUED | OUTPATIENT
Start: 2025-02-15 | End: 2025-02-15 | Stop reason: HOSPADM

## 2025-02-15 RX ORDER — LABETALOL HYDROCHLORIDE 5 MG/ML
5 INJECTION, SOLUTION INTRAVENOUS ONCE AS NEEDED
Status: DISCONTINUED | OUTPATIENT
Start: 2025-02-15 | End: 2025-02-15 | Stop reason: HOSPADM

## 2025-02-15 RX ORDER — HYDROMORPHONE HYDROCHLORIDE 0.2 MG/ML
0.2 INJECTION INTRAMUSCULAR; INTRAVENOUS; SUBCUTANEOUS EVERY 5 MIN PRN
Status: DISCONTINUED | OUTPATIENT
Start: 2025-02-15 | End: 2025-02-15 | Stop reason: HOSPADM

## 2025-02-15 RX ORDER — ONDANSETRON HYDROCHLORIDE 2 MG/ML
4 INJECTION, SOLUTION INTRAVENOUS ONCE AS NEEDED
Status: DISCONTINUED | OUTPATIENT
Start: 2025-02-15 | End: 2025-02-15 | Stop reason: HOSPADM

## 2025-02-15 RX ORDER — ROCURONIUM BROMIDE 10 MG/ML
INJECTION, SOLUTION INTRAVENOUS AS NEEDED
Status: DISCONTINUED | OUTPATIENT
Start: 2025-02-15 | End: 2025-02-15

## 2025-02-15 RX ORDER — LOSARTAN POTASSIUM 100 MG/1
100 TABLET ORAL DAILY
COMMUNITY

## 2025-02-15 RX ORDER — NAPROXEN SODIUM 220 MG/1
81 TABLET, FILM COATED ORAL
Status: DISCONTINUED | OUTPATIENT
Start: 2025-02-15 | End: 2025-02-18 | Stop reason: HOSPADM

## 2025-02-15 RX ORDER — ACETAMINOPHEN 325 MG/1
650 TABLET ORAL EVERY 8 HOURS PRN
COMMUNITY

## 2025-02-15 RX ORDER — POLYETHYLENE GLYCOL 3350 17 G/17G
17 POWDER, FOR SOLUTION ORAL DAILY
Status: DISCONTINUED | OUTPATIENT
Start: 2025-02-15 | End: 2025-02-18 | Stop reason: HOSPADM

## 2025-02-15 RX ORDER — ONDANSETRON HYDROCHLORIDE 2 MG/ML
INJECTION, SOLUTION INTRAVENOUS CODE/TRAUMA/SEDATION MEDICATION
Status: COMPLETED | OUTPATIENT
Start: 2025-02-15 | End: 2025-02-15

## 2025-02-15 RX ORDER — PROPOFOL 10 MG/ML
INJECTION, EMULSION INTRAVENOUS CODE/TRAUMA/SEDATION MEDICATION
Status: COMPLETED | OUTPATIENT
Start: 2025-02-15 | End: 2025-02-15

## 2025-02-15 RX ORDER — METOPROLOL TARTRATE 1 MG/ML
INJECTION, SOLUTION INTRAVENOUS AS NEEDED
Status: DISCONTINUED | OUTPATIENT
Start: 2025-02-15 | End: 2025-02-15

## 2025-02-15 RX ORDER — OXYCODONE HYDROCHLORIDE 5 MG/1
5 TABLET ORAL EVERY 4 HOURS PRN
Status: DISCONTINUED | OUTPATIENT
Start: 2025-02-15 | End: 2025-02-17

## 2025-02-15 RX ORDER — METOCLOPRAMIDE HYDROCHLORIDE 5 MG/ML
10 INJECTION INTRAMUSCULAR; INTRAVENOUS ONCE AS NEEDED
Status: DISCONTINUED | OUTPATIENT
Start: 2025-02-15 | End: 2025-02-15 | Stop reason: HOSPADM

## 2025-02-15 RX ORDER — DIPHENHYDRAMINE HCL 25 MG
25 CAPSULE ORAL EVERY 6 HOURS PRN
COMMUNITY

## 2025-02-15 RX ORDER — OXYCODONE HYDROCHLORIDE 5 MG/1
2.5 TABLET ORAL EVERY 4 HOURS PRN
Status: DISCONTINUED | OUTPATIENT
Start: 2025-02-15 | End: 2025-02-17

## 2025-02-15 RX ORDER — TRANEXAMIC ACID 100 MG/ML
INJECTION, SOLUTION INTRAVENOUS AS NEEDED
Status: DISCONTINUED | OUTPATIENT
Start: 2025-02-15 | End: 2025-02-15

## 2025-02-15 RX ORDER — SODIUM CHLORIDE, SODIUM LACTATE, POTASSIUM CHLORIDE, CALCIUM CHLORIDE 600; 310; 30; 20 MG/100ML; MG/100ML; MG/100ML; MG/100ML
50 INJECTION, SOLUTION INTRAVENOUS CONTINUOUS
Status: DISCONTINUED | OUTPATIENT
Start: 2025-02-15 | End: 2025-02-15 | Stop reason: HOSPADM

## 2025-02-15 RX ORDER — ADHESIVE BANDAGE
30 BANDAGE TOPICAL DAILY PRN
COMMUNITY

## 2025-02-15 RX ORDER — SODIUM CHLORIDE, SODIUM LACTATE, POTASSIUM CHLORIDE, CALCIUM CHLORIDE 600; 310; 30; 20 MG/100ML; MG/100ML; MG/100ML; MG/100ML
75 INJECTION, SOLUTION INTRAVENOUS CONTINUOUS
Status: ACTIVE | OUTPATIENT
Start: 2025-02-15 | End: 2025-02-16

## 2025-02-15 RX ORDER — CEFAZOLIN 1 G/1
INJECTION, POWDER, FOR SOLUTION INTRAVENOUS AS NEEDED
Status: DISCONTINUED | OUTPATIENT
Start: 2025-02-15 | End: 2025-02-15

## 2025-02-15 RX ORDER — LEVOTHYROXINE SODIUM 88 UG/1
88 TABLET ORAL
Status: DISCONTINUED | OUTPATIENT
Start: 2025-02-16 | End: 2025-02-18 | Stop reason: HOSPADM

## 2025-02-15 RX ORDER — LIDOCAINE HYDROCHLORIDE 10 MG/ML
0.1 INJECTION, SOLUTION INFILTRATION; PERINEURAL ONCE
Status: DISCONTINUED | OUTPATIENT
Start: 2025-02-15 | End: 2025-02-15 | Stop reason: HOSPADM

## 2025-02-15 RX ORDER — ATORVASTATIN CALCIUM 10 MG/1
10 TABLET, FILM COATED ORAL DAILY
Status: DISCONTINUED | OUTPATIENT
Start: 2025-02-15 | End: 2025-02-18 | Stop reason: HOSPADM

## 2025-02-15 RX ORDER — ONDANSETRON 4 MG/1
4 TABLET, FILM COATED ORAL EVERY 8 HOURS PRN
COMMUNITY

## 2025-02-15 RX ORDER — ALBUTEROL SULFATE 0.83 MG/ML
2.5 SOLUTION RESPIRATORY (INHALATION) ONCE AS NEEDED
Status: DISCONTINUED | OUTPATIENT
Start: 2025-02-15 | End: 2025-02-15 | Stop reason: HOSPADM

## 2025-02-15 RX ORDER — BISACODYL 10 MG/1
10 SUPPOSITORY RECTAL DAILY
COMMUNITY

## 2025-02-15 RX ORDER — NORETHINDRONE AND ETHINYL ESTRADIOL 0.5-0.035
KIT ORAL AS NEEDED
Status: DISCONTINUED | OUTPATIENT
Start: 2025-02-15 | End: 2025-02-15

## 2025-02-15 RX ORDER — ONDANSETRON HYDROCHLORIDE 2 MG/ML
INJECTION, SOLUTION INTRAVENOUS AS NEEDED
Status: DISCONTINUED | OUTPATIENT
Start: 2025-02-15 | End: 2025-02-15

## 2025-02-15 RX ORDER — OXYCODONE HYDROCHLORIDE 5 MG/1
2.5 TABLET ORAL EVERY 4 HOURS PRN
Status: DISCONTINUED | OUTPATIENT
Start: 2025-02-15 | End: 2025-02-15 | Stop reason: HOSPADM

## 2025-02-15 RX ORDER — ENEMA 19; 7 G/133ML; G/133ML
1 ENEMA RECTAL DAILY PRN
COMMUNITY

## 2025-02-15 RX ORDER — NAPROXEN SODIUM 220 MG/1
81 TABLET, FILM COATED ORAL
Status: ON HOLD | COMMUNITY
End: 2025-02-18

## 2025-02-15 RX ORDER — FENTANYL CITRATE 50 UG/ML
INJECTION, SOLUTION INTRAMUSCULAR; INTRAVENOUS AS NEEDED
Status: DISCONTINUED | OUTPATIENT
Start: 2025-02-15 | End: 2025-02-15

## 2025-02-15 RX ORDER — TOBRAMYCIN 1.2 G/30ML
INJECTION, POWDER, LYOPHILIZED, FOR SOLUTION INTRAVENOUS AS NEEDED
Status: DISCONTINUED | OUTPATIENT
Start: 2025-02-15 | End: 2025-02-15 | Stop reason: HOSPADM

## 2025-02-15 RX ORDER — HYDRALAZINE HYDROCHLORIDE 20 MG/ML
5 INJECTION INTRAMUSCULAR; INTRAVENOUS EVERY 30 MIN PRN
Status: DISCONTINUED | OUTPATIENT
Start: 2025-02-15 | End: 2025-02-15 | Stop reason: HOSPADM

## 2025-02-15 RX ORDER — OXYCODONE HYDROCHLORIDE 5 MG/1
5 TABLET ORAL EVERY 4 HOURS PRN
Status: DISCONTINUED | OUTPATIENT
Start: 2025-02-15 | End: 2025-02-15 | Stop reason: HOSPADM

## 2025-02-15 RX ORDER — MIDAZOLAM HYDROCHLORIDE 1 MG/ML
1 INJECTION INTRAMUSCULAR; INTRAVENOUS ONCE
Status: COMPLETED | OUTPATIENT
Start: 2025-02-15 | End: 2025-02-15

## 2025-02-15 RX ORDER — TALC
3 POWDER (GRAM) TOPICAL NIGHTLY PRN
Status: DISCONTINUED | OUTPATIENT
Start: 2025-02-15 | End: 2025-02-17

## 2025-02-15 RX ORDER — DOCUSATE SODIUM 100 MG/1
100 CAPSULE, LIQUID FILLED ORAL 2 TIMES DAILY PRN
COMMUNITY

## 2025-02-15 RX ORDER — ALBUMIN HUMAN 50 G/1000ML
SOLUTION INTRAVENOUS AS NEEDED
Status: DISCONTINUED | OUTPATIENT
Start: 2025-02-15 | End: 2025-02-15

## 2025-02-15 RX ORDER — VANCOMYCIN HYDROCHLORIDE 1 G/20ML
INJECTION, POWDER, LYOPHILIZED, FOR SOLUTION INTRAVENOUS AS NEEDED
Status: DISCONTINUED | OUTPATIENT
Start: 2025-02-15 | End: 2025-02-15 | Stop reason: HOSPADM

## 2025-02-15 RX ORDER — AMOXICILLIN 250 MG
2 CAPSULE ORAL 2 TIMES DAILY
Status: DISCONTINUED | OUTPATIENT
Start: 2025-02-15 | End: 2025-02-18 | Stop reason: HOSPADM

## 2025-02-15 RX ORDER — ACETAMINOPHEN 650 MG/1
650 SUPPOSITORY RECTAL EVERY 8 HOURS PRN
COMMUNITY
End: 2025-02-15 | Stop reason: ENTERED-IN-ERROR

## 2025-02-15 RX ORDER — ONDANSETRON HYDROCHLORIDE 2 MG/ML
INJECTION, SOLUTION INTRAVENOUS
Status: DISPENSED
Start: 2025-02-15 | End: 2025-02-15

## 2025-02-15 RX ORDER — HEPARIN SODIUM 5000 [USP'U]/ML
5000 INJECTION, SOLUTION INTRAVENOUS; SUBCUTANEOUS EVERY 8 HOURS SCHEDULED
Status: DISCONTINUED | OUTPATIENT
Start: 2025-02-15 | End: 2025-02-17

## 2025-02-15 RX ORDER — LIDOCAINE HYDROCHLORIDE 20 MG/ML
INJECTION, SOLUTION INFILTRATION; PERINEURAL AS NEEDED
Status: DISCONTINUED | OUTPATIENT
Start: 2025-02-15 | End: 2025-02-15

## 2025-02-15 RX ORDER — LOSARTAN POTASSIUM 100 MG/1
100 TABLET ORAL DAILY
Status: DISCONTINUED | OUTPATIENT
Start: 2025-02-15 | End: 2025-02-18 | Stop reason: HOSPADM

## 2025-02-15 RX ADMIN — ONDANSETRON 4 MG: 2 INJECTION INTRAMUSCULAR; INTRAVENOUS at 10:00

## 2025-02-15 RX ADMIN — DEXAMETHASONE SODIUM PHOSPHATE 8 MG: 4 INJECTION INTRA-ARTICULAR; INTRALESIONAL; INTRAMUSCULAR; INTRAVENOUS; SOFT TISSUE at 08:37

## 2025-02-15 RX ADMIN — ONDANSETRON 4 MG: 2 INJECTION, SOLUTION INTRAMUSCULAR; INTRAVENOUS at 02:03

## 2025-02-15 RX ADMIN — SODIUM CHLORIDE 1000 ML: 0.9 INJECTION, SOLUTION INTRAVENOUS at 01:47

## 2025-02-15 RX ADMIN — HEPARIN SODIUM 5000 UNITS: 5000 INJECTION, SOLUTION INTRAVENOUS; SUBCUTANEOUS at 21:02

## 2025-02-15 RX ADMIN — METOPROLOL TARTRATE 1 MG: 1 INJECTION, SOLUTION INTRAVENOUS at 09:19

## 2025-02-15 RX ADMIN — PROPOFOL 30 MG: 10 INJECTION, EMULSION INTRAVENOUS at 08:39

## 2025-02-15 RX ADMIN — MIDAZOLAM HYDROCHLORIDE 1 MG: 1 INJECTION, SOLUTION INTRAMUSCULAR; INTRAVENOUS at 00:52

## 2025-02-15 RX ADMIN — PROPOFOL 50 MG: 10 INJECTION, EMULSION INTRAVENOUS at 08:06

## 2025-02-15 RX ADMIN — MELATONIN 3 MG: 3 TAB ORAL at 04:36

## 2025-02-15 RX ADMIN — SODIUM CHLORIDE, POTASSIUM CHLORIDE, SODIUM LACTATE AND CALCIUM CHLORIDE 75 ML/HR: 600; 310; 30; 20 INJECTION, SOLUTION INTRAVENOUS at 04:39

## 2025-02-15 RX ADMIN — SUGAMMADEX 200 MG: 100 INJECTION, SOLUTION INTRAVENOUS at 10:13

## 2025-02-15 RX ADMIN — OXYCODONE 5 MG: 5 TABLET ORAL at 04:36

## 2025-02-15 RX ADMIN — METOPROLOL TARTRATE 1 MG: 1 INJECTION, SOLUTION INTRAVENOUS at 09:13

## 2025-02-15 RX ADMIN — OXYCODONE 5 MG: 5 TABLET ORAL at 14:24

## 2025-02-15 RX ADMIN — FENTANYL CITRATE 25 MCG: 50 INJECTION, SOLUTION INTRAMUSCULAR; INTRAVENOUS at 08:15

## 2025-02-15 RX ADMIN — FENTANYL CITRATE 50 MCG: 50 INJECTION, SOLUTION INTRAMUSCULAR; INTRAVENOUS at 08:22

## 2025-02-15 RX ADMIN — PROPOFOL 10 MG: 10 INJECTION, EMULSION INTRAVENOUS at 10:31

## 2025-02-15 RX ADMIN — EPHEDRINE SULFATE 5 MG: 50 INJECTION, SOLUTION INTRAVENOUS at 12:25

## 2025-02-15 RX ADMIN — HYDROMORPHONE HYDROCHLORIDE 0.3 MG: 1 INJECTION, SOLUTION INTRAMUSCULAR; INTRAVENOUS; SUBCUTANEOUS at 10:56

## 2025-02-15 RX ADMIN — PROPOFOL 20 MG: 10 INJECTION, EMULSION INTRAVENOUS at 09:17

## 2025-02-15 RX ADMIN — ACETAMINOPHEN 975 MG: 325 TABLET, FILM COATED ORAL at 14:25

## 2025-02-15 RX ADMIN — Medication 2 L/MIN: at 12:00

## 2025-02-15 RX ADMIN — HYDROMORPHONE HYDROCHLORIDE 0.2 MG: 1 INJECTION, SOLUTION INTRAMUSCULAR; INTRAVENOUS; SUBCUTANEOUS at 10:22

## 2025-02-15 RX ADMIN — PROPOFOL 40 MG: 10 INJECTION, EMULSION INTRAVENOUS at 01:51

## 2025-02-15 RX ADMIN — METOPROLOL TARTRATE 1 MG: 1 INJECTION, SOLUTION INTRAVENOUS at 09:16

## 2025-02-15 RX ADMIN — Medication 2 L/MIN: at 01:47

## 2025-02-15 RX ADMIN — ROCURONIUM BROMIDE 10 MG: 10 INJECTION INTRAVENOUS at 09:21

## 2025-02-15 RX ADMIN — PROPOFOL 20 MG: 10 INJECTION, EMULSION INTRAVENOUS at 02:01

## 2025-02-15 RX ADMIN — FENTANYL CITRATE 25 MCG: 50 INJECTION, SOLUTION INTRAMUSCULAR; INTRAVENOUS at 08:06

## 2025-02-15 RX ADMIN — ACETAMINOPHEN 975 MG: 325 TABLET, FILM COATED ORAL at 21:02

## 2025-02-15 RX ADMIN — SODIUM CHLORIDE, POTASSIUM CHLORIDE, SODIUM LACTATE AND CALCIUM CHLORIDE 500 ML: 600; 310; 30; 20 INJECTION, SOLUTION INTRAVENOUS at 12:45

## 2025-02-15 RX ADMIN — OXYCODONE 5 MG: 5 TABLET ORAL at 21:02

## 2025-02-15 RX ADMIN — ATORVASTATIN CALCIUM 10 MG: 10 TABLET, FILM COATED ORAL at 18:58

## 2025-02-15 RX ADMIN — ROCURONIUM BROMIDE 40 MG: 10 INJECTION INTRAVENOUS at 08:06

## 2025-02-15 RX ADMIN — CEFAZOLIN 2 G: 1 INJECTION, POWDER, FOR SOLUTION INTRAMUSCULAR; INTRAVENOUS at 08:32

## 2025-02-15 RX ADMIN — LIDOCAINE HYDROCHLORIDE 100 MG: 20 INJECTION, SOLUTION INFILTRATION; PERINEURAL at 08:06

## 2025-02-15 RX ADMIN — HYDROMORPHONE HYDROCHLORIDE 0.2 MG: 0.2 INJECTION, SOLUTION INTRAMUSCULAR; INTRAVENOUS; SUBCUTANEOUS at 11:23

## 2025-02-15 RX ADMIN — TRANEXAMIC ACID 1000 MG: 100 INJECTION INTRAVENOUS at 08:32

## 2025-02-15 RX ADMIN — ROCURONIUM BROMIDE 10 MG: 10 INJECTION INTRAVENOUS at 08:52

## 2025-02-15 RX ADMIN — ALBUMIN HUMAN 250 ML: 0.05 INJECTION, SOLUTION INTRAVENOUS at 09:56

## 2025-02-15 SDOH — HEALTH STABILITY: MENTAL HEALTH: CURRENT SMOKER: 0

## 2025-02-15 ASSESSMENT — COGNITIVE AND FUNCTIONAL STATUS - GENERAL
TURNING FROM BACK TO SIDE WHILE IN FLAT BAD: A LOT
MOVING TO AND FROM BED TO CHAIR: A LOT
TOILETING: A LOT
STANDING UP FROM CHAIR USING ARMS: A LOT
MOBILITY SCORE: 11
DRESSING REGULAR LOWER BODY CLOTHING: A LOT
CLIMB 3 TO 5 STEPS WITH RAILING: TOTAL
MOVING FROM LYING ON BACK TO SITTING ON SIDE OF FLAT BED WITH BEDRAILS: A LOT
EATING MEALS: A LOT
PERSONAL GROOMING: A LOT
DAILY ACTIVITIY SCORE: 12
WALKING IN HOSPITAL ROOM: A LOT
DRESSING REGULAR UPPER BODY CLOTHING: A LOT
HELP NEEDED FOR BATHING: A LOT

## 2025-02-15 ASSESSMENT — ENCOUNTER SYMPTOMS
RHINORRHEA: 0
CONFUSION: 0
COUGH: 0
FEVER: 0
NUMBNESS: 0
DIARRHEA: 0
LIGHT-HEADEDNESS: 0
WOUND: 0
VOMITING: 0
CHILLS: 0
NECK PAIN: 0
SHORTNESS OF BREATH: 0
DIZZINESS: 0
ABDOMINAL PAIN: 0
AGITATION: 0

## 2025-02-15 ASSESSMENT — PAIN SCALES - GENERAL
PAINLEVEL_OUTOF10: 7
PAINLEVEL_OUTOF10: 6
PAINLEVEL_OUTOF10: 0 - NO PAIN
PAINLEVEL_OUTOF10: 8
PAINLEVEL_OUTOF10: 0 - NO PAIN
PAINLEVEL_OUTOF10: 0 - NO PAIN
PAINLEVEL_OUTOF10: 5 - MODERATE PAIN

## 2025-02-15 ASSESSMENT — PAIN SCALES - WONG BAKER
WONGBAKER_NUMERICALRESPONSE: HURTS EVEN MORE
WONGBAKER_NUMERICALRESPONSE: HURTS EVEN MORE

## 2025-02-15 ASSESSMENT — PAIN - FUNCTIONAL ASSESSMENT
PAIN_FUNCTIONAL_ASSESSMENT: UNABLE TO SELF-REPORT
PAIN_FUNCTIONAL_ASSESSMENT: 0-10
PAIN_FUNCTIONAL_ASSESSMENT: 0-10
PAIN_FUNCTIONAL_ASSESSMENT: UNABLE TO SELF-REPORT
PAIN_FUNCTIONAL_ASSESSMENT: UNABLE TO SELF-REPORT
PAIN_FUNCTIONAL_ASSESSMENT: 0-10
PAIN_FUNCTIONAL_ASSESSMENT: WONG-BAKER FACES
PAIN_FUNCTIONAL_ASSESSMENT: 0-10
PAIN_FUNCTIONAL_ASSESSMENT: UNABLE TO SELF-REPORT
PAIN_FUNCTIONAL_ASSESSMENT: UNABLE TO SELF-REPORT
PAIN_FUNCTIONAL_ASSESSMENT: WONG-BAKER FACES
PAIN_FUNCTIONAL_ASSESSMENT: UNABLE TO SELF-REPORT

## 2025-02-15 ASSESSMENT — PAIN DESCRIPTION - ORIENTATION: ORIENTATION: RIGHT

## 2025-02-15 ASSESSMENT — PAIN DESCRIPTION - LOCATION: LOCATION: ARM

## 2025-02-15 NOTE — ANESTHESIA PROCEDURE NOTES
Airway  Date/Time: 2/15/2025 8:12 AM  Urgency: elective    Airway not difficult    Staffing  Performed: CRNA   Authorized by: Trino Baron MD    Performed by: GRANT Chavez  Patient location during procedure: OR    Indications and Patient Condition  Indications for airway management: anesthesia  Spontaneous Ventilation: absent  Sedation level: deep  Preoxygenated: yes  Patient position: sniffing  Mask difficulty assessment: 2 - vent by mask + OA or adjuvant +/- NMBA    Final Airway Details  Final airway type: endotracheal airway      Successful airway: ETT  Cuffed: yes   Successful intubation technique: video laryngoscopy  Facilitating devices/methods: intubating stylet  Blade: Oscar  Blade size: #4  ETT size (mm): 7.0  Cormack-Lehane Classification: grade I - full view of glottis  Placement verified by: chest auscultation and capnometry   Measured from: lips  ETT to lips (cm): 19  Number of attempts at approach: 1  Number of other approaches attempted: 0

## 2025-02-15 NOTE — PROGRESS NOTES
Physical Therapy                 Therapy Communication Note    Patient Name: Lyndsey Rosado  MRN: [id number]  Department: [place] [address]  Room: [facility]  Today's Date: 2/15/2025     Discipline: Physical Therapy    PT Missed Visit: Yes     Missed Visit Reason: Missed Visit Reason:  (Pending OR 2/15 per EMR; will reattempt post op as appropriate.)    Missed Time: Attempt 0639      02/15/25 at 6:40 AM - Macarena Govea, PT

## 2025-02-15 NOTE — SIGNIFICANT EVENT
Ok for OR with orthopedic surgery from neurosurgical standpoint. Please maintain strict TL precautions. Can obtain TLSO brace post op.     Janeth Mas MD  PGY-3 Neurosurgery  7:40 AM

## 2025-02-15 NOTE — H&P
[facility]  TRAUMA SERVICE - HISTORY AND PHYSICAL / CONSULT    Patient Name: Lyndsey Rosado  MRN: [id number]  Admit Date: 214  : 1940  AGE: 84 y.o.   GENDER: female  ==============================================================================  MECHANISM OF INJURY / CHIEF COMPLAINT:   84 F presents as a trauma consult, transfer from outside hospital after a fall at nursing facility. Patient states she turned, lost her balance, and fell onto her right side. Patient denies blood thinner use, headstrike, and loss of consciousness. Imaging obtained at outside hospital shows right humerus fracture and T12 compression fracture. Patient alert and oriented x 3 with intermittent confusion.     LOC (yes/no?): no  Anticoagulant / Anti-platelet Rx? (for what dx?): denies  Referring Facility Name (N/A for scene EMR run): [facility]    INJURIES:   R humerus fx  T12 compression fx    OTHER MEDICAL PROBLEMS:  SHEILA  HTN, HLD  Hypothyroidism  T2DM  CKD III (baseline Cr ~ 1.5)  Dementia    INCIDENTAL FINDINGS:  N/A    ==============================================================================  ADMISSION PLAN OF CARE:    # R humerus fx  - Ortho rec: R humerus hardware removal, IMN 15   - s/p splint   - NWB RUE  - Multimodal pain control  - PT/OT when appropriate    # T12 compression fx  - NSGY rec: no acute intervention   - T/L spine uprights when able - ordered   - soft brace for comfort in setting of back pain    # Pre-operative Clearance  - Pre-op studies: CBC, CMP, Mag, Phos, Coags, Updated T&S x2  - EKG: NSR w/ RBBB, HR 67, QTc 486, no ST-T abnormalities  - CXR: no acute cardiopulmonary process  - BNP: 42  - RCRI: 1 Points, 6.0% 30-day risk of death, MI, or cardiac arrest.   - Preoperative Creatinine >2mg/dL; CKD III   - Please see significant event note for further pre-op risk stratification.     # Syncope  - EKG: NSR w/ RBBB, similar to previous  - Troponin: negative  -  Telemetry  - Orthostatics when able  - TTE ordered    # Comorbidities: HTN, HLD, T2DM, hypothyroid, CKD3  - Formal med rec to be done by pharmacy, will continue home meds as clinically appropriate    # FEN/GI/  # SHEILA  - NPO at midnight (except clear liquids until 5A), pend OR with ortho   - SSI   - mIVF  - Strict I/Os  - Monitor and replete electrolytes as clinically indicated    # DVT Ppx  - SCDs  - SQH (CrCl < 30)    Dispo: admit to trauma floor with telemetry, pend OR with orthopedics    Patient and plan discussed with trauma fellow, Dr. Urban, and attending, Dr. Rivera.    Terra Leahy PA-C  Trauma, Critical Care, and Acute Care Surgery  Floor: [id number] TSICU: [id number]    Total time of 30 minutes spent reviewing PMH, ordering medications and/or diagnostic tests, examining the patient, and documenting in the EMR with with >50% of time spent face to face with patient/family discussing plan of care/management, counseling/educating on disease processes, explaining results of diagnostic testing.  ==============================================================================  PAST MEDICAL HISTORY:   PMH: T2DM, CKD3, HTN, HLD, Hypothyrodism  Past Medical History:   Diagnosis Date    Anemia     Arthritis     Diabetes mellitus (Multi)     Disease of thyroid gland     Diverticulitis     [place] (hard of hearing)     Hyperlipidemia     Hypertension     Hypothyroid     Renal disorder      PSH: R humerus repair  No past surgical history on file.  FH:   No family history on file.  SOCIAL HISTORY:    Smoking: denies   Social History     Tobacco Use   Smoking Status Never   Smokeless Tobacco Never       Alcohol: denies   Social History     Substance and Sexual Activity   Alcohol Use Never       Drug use: denies    MEDICATIONS: levothyroxine, atorvastatin  Prior to Admission medications    Medication Sig Start Date End Date Taking? Authorizing Provider   atorvastatin (Lipitor) 10 mg tablet Take 1 tablet (10 mg) by mouth once daily.     Historical Provider, MD   calcium carbonate-vitamin D3 600 mg-20 mcg (800 unit) tablet,chewable Chew 1 tablet once daily.    Historical Provider, MD   levothyroxine (Tirosint) 88 mcg capsule Take 1 capsule (88 mcg) by mouth once daily in the morning. Take before meals.    Historical Provider, MD   olmesartan (BENIcar) 20 mg tablet Take 1 tablet (20 mg) by mouth once daily.    Historical Provider, MD   pioglitazone (Actos) 15 mg tablet Take 1 tablet (15 mg) by mouth once daily.    Historical Provider, MD     ALLERGIES: Penicillin  Allergies   Allergen Reactions    Penicillin G Unknown     Other Reaction(s): Unknown    Penicillins Unknown       REVIEW OF SYSTEMS:  Review of Systems   Constitutional:  Negative for chills and fever.   HENT:  Negative for rhinorrhea.    Respiratory:  Negative for cough and shortness of breath.    Cardiovascular:  Negative for chest pain.   Gastrointestinal:  Negative for abdominal pain, diarrhea and vomiting.   Musculoskeletal:  Negative for neck pain.   Skin:  Negative for wound.   Neurological:  Negative for dizziness, syncope, light-headedness and numbness.   Psychiatric/Behavioral:  Negative for agitation and confusion.      PHYSICAL EXAM:  PRIMARY SURVEY:  Airway  Airway is patent.     Breathing  Breathing is normal. Right breath sounds are normal. Left breath sounds are normal.     Circulation  Cardiac rhythm is regular. Rate is regular.   Pulses  Radial: 2+ on the right; 2+ on the left.  Pedal: 2+ on the right; 2+ on the left.    Disability  David Coma Score  Eye:4   Verbal:4   Motor:6      14  Pupils  Right Pupil:   round and reactive        Left Pupil:   round and reactive           Motor Strength   strength:  3/5 on the right  5/5 on the left  Dorsiflex strength:  5/5 on the right  5/5 on the left  Plantarflex strength:  5/5 on the right  5/5 on the left  The patient does not have a sensory deficit.     SECONDARY SURVEY/PHYSICAL EXAM:  Physical Exam  Vitals reviewed.    Constitutional:       General: She is not in acute distress.     Appearance: She is not diaphoretic.   HENT:      Head: Normocephalic and atraumatic.      Comments: No bony stepoffs, no scalp hematomas. Midface stable.      Nose: No rhinorrhea.      Mouth/Throat:      Mouth: Mucous membranes are moist.      Pharynx: Oropharynx is clear.      Comments: No blood in the oropharynx. Dentition grossly intact. No malocclusion noted.  Eyes:      Extraocular Movements: Extraocular movements intact.      Pupils: Pupils are equal, round, and reactive to light.   Cardiovascular:      Rate and Rhythm: Normal rate.      Pulses: Normal pulses.   Pulmonary:      Effort: Pulmonary effort is normal. No respiratory distress.      Breath sounds: Normal breath sounds. No wheezing.   Chest:      Chest wall: No tenderness.   Abdominal:      General: There is no distension.      Palpations: Abdomen is soft.      Tenderness: There is no abdominal tenderness.   Musculoskeletal:         General: Tenderness and deformity present. No swelling.      Cervical back: No tenderness.      Comments: RUE in splint. Patient moves all extremities spontaneously. Bilateral lower extremities mildly edematous    Neurological:      Mental Status: She is alert.     IMAGING SUMMARY:  CT Head: no acute traumatic findings  CT C/T/L-Spine: findings as above  CT Chest/Abd/Pelvis: no acute traumatic findings  CXR/PXR: no acute findings or processes  RUE XR: findings as above    LABS:  Results from last 7 days   Lab Units 02/14/25  1746   WBC AUTO x10*3/uL 7.5   HEMOGLOBIN g/dL 10.4*   HEMATOCRIT % 33.8*   PLATELETS AUTO x10*3/uL 223   NEUTROS PCT AUTO % 74.8   LYMPHS PCT AUTO % 14.5   MONOS PCT AUTO % 8.9   EOS PCT AUTO % 0.9     Results from last 7 days   Lab Units 02/14/25  1746   INR  1.3*     Results from last 7 days   Lab Units 02/14/25  1746   SODIUM mmol/L 138   POTASSIUM mmol/L 4.9   CHLORIDE mmol/L 105   CO2 mmol/L 24   BUN mg/dL 38*   CREATININE mg/dL  2.16*   CALCIUM mg/dL 9.1   PROTEIN TOTAL g/dL 6.7   BILIRUBIN TOTAL mg/dL 0.7   ALK PHOS U/L 74   ALT U/L 8   AST U/L 13   GLUCOSE mg/dL 118*     Results from last 7 days   Lab Units 02/14/25  1746   BILIRUBIN TOTAL mg/dL 0.7       I have reviewed all laboratory and imaging results ordered/pertinent for this encounter.

## 2025-02-15 NOTE — ED PROCEDURE NOTE
Procedure  Moderate Sedation    Performed by: Chantale Parmar DO  Authorized by: Keyur Wagoner DO    Consent:     Consent obtained:  Written    Consent given by:  Patient    Risks, benefits, and alternatives were discussed: yes      Risks discussed:  Prolonged hypoxia resulting in organ damage, prolonged sedation necessitating reversal, inadequate sedation, respiratory compromise necessitating ventilatory assistance and intubation and nausea    Alternatives discussed:  Analgesia without sedation  Universal protocol:     Protocol observed: The universal protocol was observed before the procedure and is documented in the nursing flowsheets    Indications:     Procedure performed:  Fracture reduction    Procedure necessitating sedation performed by:  Physician performing sedation    Level of sedation:  Moderate  Pre-sedation assessment:     Mouth opening:  3 or more finger widths    Thyromental distance:  4 finger widths    Mallampati score:  I - soft palate, uvula, fauces, pillars visible    Neck mobility: normal      History of difficult intubation: no    Immediate pre-procedure details:     Monitoring: The patient is on appropriate monitoring (Including: 3 or 5 lead EKG, Pulse Oximetry, Capnography, and Blood Pressure monitoring), oxygenation has been addressed, and critical airway and emergency equipment is immediately available before the initiation of sedation      Reassessment: Patient reassessed immediately prior to procedure      Reviewed: vital signs, relevant labs/tests and NPO status    Procedure details (see MAR for exact dosages):     Sedation start time:  2/15/2025 1:50 AM    Sedation end time:  2/15/2025 2:24 AM    Total sedation time (minutes):  34  Post-procedure details:     Attendance: Constant attendance by certified staff until patient recovered      Procedure completion:  Tolerated               Chantale Parmar DO  Resident  02/15/25 0225

## 2025-02-15 NOTE — HOSPITAL COURSE
84 year old female presents as trauma consult transfer from outside hospital after a syncopal fall at [facility]. Imaging showed T12 compression fracture and right humerus fracture. Neuro-spine recommended T/L uprights and a soft brace for comfort. No acute intervention. Orthopedics recommended operative fixation of right humerus. Patient to OR with orthopedics on 2/15 for removal of hardware and IMN of right humerus. Patient admitted to trauma floor with telemetry. Syncopal workup performed. Troponin negative. EKG similar to previous. Echo ordered, showed ***. Patient with SHEILA, started maintenance IVF. Home medications were resumed as appropriate. PT/OT evaluated patient and recommended *** at discharge.

## 2025-02-15 NOTE — ED TRIAGE NOTES
Pt was transferred from [facility] for an ortho consult for a R humerus fraction and T12 fracture.

## 2025-02-15 NOTE — CONSULTS
ORTHOPAEDIC CONSULTATION     History Of Present Illness  Lyndsey Rosado is a 84 y.o. female presenting with acute onset of right arm pain after syncopal fall on 2/14/2025.  Patient denies any numbness or tingling in her right upper extremity.  She denies pain elsewhere.  Of note, she underwent a right humerus open reduction internal fixation in 2007.  She is unsure where this took place however it did take place in [place].    Review of Systems: 12 point ROS negative unless stated in HPI    Past Medical History  She has a past medical history of Anemia, Arthritis, Diabetes mellitus (Multi), Disease of thyroid gland, Diverticulitis, [place] (hard of hearing), Hyperlipidemia, Hypertension, Hypothyroid, and Renal disorder.    Surgical History  She has no past surgical history on file.     Social History  She reports that she has never smoked. She has never used smokeless tobacco. She reports that she does not drink alcohol. No history on file for drug use.    Family History  No family history on file.     Allergies  Penicillin g and Penicillins    Review of Systems  12 point ROS negative unless stated in HPI     Physical Exam  · Physical Exam:  - Constitutional: No acute distress, cooperative  - Eyes: EOM grossly intact  - Head/Neck: Trachea midline  - Respiratory/Thorax: Normal work of breathing  - Cardiovascular: RRR on peripheral palpation  - Gastrointestinal: Nondistended  - Psychological: Appropriate mood/behavior  - Skin: Warm and dry. Additional findings in musculoskeletal evaluation  - Musculoskeletal:  Right upper extremity:   -Skin intact, old surgical incision well healed over anterior arm. Mild ecchymosis of lateral upper arm  -Tender at site of injury with painful ROM.  -Fires axillary/AIN/PIN/ulnar distributions  -SILT axillary/radial/median/ulnar distributions  -Hand warm, well perfused  -Palpable but weak radial pulse, cap refill brisk  -Compartments soft and compressible      A full secondary survey of all  four extremities was performed and the significant orthopedic findings are noted above.    Last Recorded Vitals  There were no vitals taken for this visit.    Imaging:  AP and lateral radiographs of the right humerus display fernando-implant distal third radial shaft fracture. Improved alignment after splint application.    CT angio of the right upper extremity did not demonstrate any vascular injury.  No signs of intra-articular extension into the distal humerus.     Assessment/Plan   84-year-old female with a past medical history of hypertension, type 2 diabetes, chronic kidney disease stage III, ORIF right humerus in 2007 who presents with a right fernando-implant distal third humeral shaft fracture after syncopal ground-level fall.  Patient will require operative fixation.  Patient was placed into a coaptation and long-arm splint for temporary immobilization.    Plan:   - NPO for upcoming surgery with orthopedics.  - Admit to trauma, verbally clear per trauma; Appreciate documentation of clearance by primary team  - Consented and posted to OR schedule for LEIDY and IMN R humerus w/ Dr. Connors on 2/15/2025  - NWB to the right upper extremity in coaptation + LAS   - Pre-operative ABx: None indicated  - DVT PPx: SCDs, okay for chemoppx per primary  - Ortho trauma to follow    Consult seen and evaluated within 30 minutes of notification.    This consult was staffed with attending physician, Dr. Connors.    While admitted, this patient will be followed by the Ortho Trauma Team. Please contact below residents with any questions (available via Epic Chat).     First call: Gustavo Gilliland, PGY-1  Second call: Leana Almendarez, PGY-2  Third call: Akash Lambert, PGY-3      Grant Fernandez, PGY-2  Orthopaedic Surgery   Available via [employer]

## 2025-02-15 NOTE — SIGNIFICANT EVENT
TRAUMA PREOPERATIVE EVALUATION    Surgical Procedure Planned: R humerus removal of hardware and IMN  Anticipated Date of Surgery: 2/15/25    EKG interpretation:    NSR, RBBB. HR 67, . No ischemic changes. Similar to previous tracings.    EKG findings indicating need for echocardiogram:  Pre-operative echo not indicated. BNP 42.    Indications for perioperative medicine consultation:  1. Acute Ischemic Heart Disease: No  2. New or Uncontrolled Arrhythmia: No  3. Clinical Evidence of Worsening Heart Failure: No  4. Pulmonary Hypertension: No  5. Aortic Stenosis (noncardiac surgery within 30 days of TAVR is acceptable risk to proceed without delay): No    RCRI:  Elevated Risk Surgery: No  History of Ischemic Heart Disease: No  History of Congestive Heart Failure: No  History of Cerebrovascular Disease: No  Pre-Operative Treatment with Insulin: No  Preoperative Creatinine >2mg/dL: Yes; CKD 3    Patient score: 1  30 day risk of death, MI, or cardiac arrest: 6.0%    Impression:  Given the urgency of surgical intervention and review of the above risk factors, there is no additional work-up necessary from a trauma perspective.     Risks versus benefits of all surgical procedures are based on the surgical and the anesthesia teams' assessments. Patient is medically optimized for surgery from a trauma team perspective at this time.     The patient should be admitted to the trauma floor with telemetry.    Patient was discussed with trauma fellow, Dr. Urban, and attending, Dr. Rivera.    Terra Leahy PA-C  Trauma, Critical Care, and Acute Care Surgery  Floor: [id number] [facility]: [id number]

## 2025-02-15 NOTE — PROGRESS NOTES
[facility] [place]  TRAUMA SERVICE - PROGRESS NOTE    Patient Name: Lyndsey Rosado  MRN: [id number]  Admit Date: 214  : 1940  AGE: 84 y.o.   GENDER: female  ==============================================================================  MECHANISM OF INJURY:   84 F presents as a trauma consult, transfer from outside hospital after a fall at nursing facility. Patient states she turned, lost her balance, and fell onto her right side. Patient denies blood thinner use, headstrike, and loss of consciousness. Imaging obtained at outside hospital shows right humerus fracture and T12 compression fracture. Patient alert and oriented x 3 with intermittent confusion.      LOC (yes/no?): no  Anticoagulant / Anti-platelet Rx? (for what dx?): denies  Referring Facility Name (N/A for scene EMR run): [facility]    INJURIES:   R humerus fx  T12 compression fx    OTHER MEDICAL PROBLEMS:  SHEILA  HTN, HLD  Hypothyroidism  T2DM  CKD III (baseline Cr ~ 1.5)  Dementia    INCIDENTAL FINDINGS:  NA    PROCEDURES:  2/15: R humerus IMN    ==============================================================================  TODAY'S ASSESSMENT AND PLAN OF CARE:  R humerus fx  S/p IMN 2/15  Awaiting postop ortho recs  Multimodal pain control: Tylenol scheduled, Oxy 2.5/5 every 4 hours as needed  T12 compression fx  NSGY rec: no acute intervention   T/L spine uprights - stable  TLSO brace for comfort  Syncope   Geriatrics consulted  Troponin negative  EKG: NSR w/ RBBB, similar to previous   Will get orthostatic VS  SHEILA on CKDIII (baseline Cr ~1.5)   Cr in Ed 2.16  Maintenance fluids  Hx HTN, HLD  Holding home losartan 2/2 soft BP   Home atorvastatin  Takes eliquis (?), ASA- holding  Hx T2DM  SSI  Hx Hypothyroidism  Home levothyroxine 88mcg daily  Hx Dementia   Melatonin nightly    DVT ppx: SubQ heparin   WBS: pending updated recs, currently NWB RUE  Diet: regular, CCD  Pain regimen: as above  Bowel regimen: MiraLAX  "daily, Trinity-Colace twice daily  PT/OT: Pending   Dispo: pending    Tanna Zeng DO, PGY1  [id number] / Epic Chat    ==============================================================================  CHIEF COMPLAINT / OVERNIGHT EVENTS:   OVNE: none   Patient doing well after surgery. Pain is well controlled at this time.  Has no complaints at this time.     MEDICAL HISTORY / ROS:  Admission history and ROS reviewed.     PHYSICAL EXAM:  Heart Rate:  [54-90]   Temp:  [36.3 °C (97.3 °F)-36.8 °C (98.2 °F)]   Resp:  [4-26]   BP: ()/(37-73)   Height:  [157.5 cm (5' 2\")]   Weight:  [90.7 kg (200 lb)-95.3 kg (210 lb)]   SpO2:  [84 %-100 %]   Physical Exam  HENT:      Head: Normocephalic.      Mouth/Throat:      Mouth: Mucous membranes are moist.   Eyes:      Extraocular Movements: Extraocular movements intact.   Cardiovascular:      Rate and Rhythm: Normal rate.      Pulses: Normal pulses.   Pulmonary:      Effort: Pulmonary effort is normal. No respiratory distress.   Abdominal:      General: There is no distension.      Palpations: Abdomen is soft.   Musculoskeletal:      Comments: Full strength BUE, 5/5 dorsi/plantar flexion    Skin:     General: Skin is warm and dry.      Capillary Refill: Capillary refill takes less than 2 seconds.   Neurological:      Mental Status: She is alert and oriented to person, place, and time.      Sensory: No sensory deficit.   Psychiatric:         Mood and Affect: Mood normal.         IMAGING SUMMARY:  (summary of new imaging findings, not a copy of dictation)  XR T/L Spine: T12 compression fx      LABS:  Results from last 7 days   Lab Units 02/14/25  1746   WBC AUTO x10*3/uL 7.5   HEMOGLOBIN g/dL 10.4*   HEMATOCRIT % 33.8*   PLATELETS AUTO x10*3/uL 223   NEUTROS PCT AUTO % 74.8   LYMPHS PCT AUTO % 14.5   MONOS PCT AUTO % 8.9   EOS PCT AUTO % 0.9     Results from last 7 days   Lab Units 02/14/25  1746   INR  1.3*     Results from last 7 days   Lab Units 02/14/25  1746   SODIUM mmol/L " 138   POTASSIUM mmol/L 4.9   CHLORIDE mmol/L 105   CO2 mmol/L 24   BUN mg/dL 38*   CREATININE mg/dL 2.16*   CALCIUM mg/dL 9.1   PROTEIN TOTAL g/dL 6.7   BILIRUBIN TOTAL mg/dL 0.7   ALK PHOS U/L 74   ALT U/L 8   AST U/L 13   GLUCOSE mg/dL 118*     Results from last 7 days   Lab Units 02/14/25  1746   BILIRUBIN TOTAL mg/dL 0.7           I have reviewed all medications, laboratory results, and imaging pertinent for today's encounter.

## 2025-02-15 NOTE — H&P
[facility] Department of Orthopaedic Surgery   Surgical History & Physical <30 Days  02/15/25    History & Physical Reviewed:  I have reviewed the History and Physical for obtained within the last 30 days. Relevant findings and updates are noted below:  No significant changes.    Home medications were reviewed with significant updates noted below:  No significant changes.    ERAS patient?: No    COVID-19 Risk Consent:   Surgeon has reviewed the key risks related to alvaro COVID-19 and subsequent sequelae.     Geraldine Miranda MD   Orthopedic Surgery PGY1  [facility]     This patient will be followed by the Orthopaedic Trauma Service.     For urgent matters at any time, or for any needs between 6 PM and 6 AM Monday through Friday, on weekends, or on holidays, please page the Orthopaedic Surgery resident on call at [phone].    For non-urgent matters between 6 AM and 6 PM Monday through Friday, please contact the listed residents via Epic Chat:     First Call: Gustavo Gilliland MD    Second Call: Leana Almendarez MD   Third call: Akash Lambert MD

## 2025-02-15 NOTE — ANESTHESIA PREPROCEDURE EVALUATION
Patient: Lyndsey Rosado    Procedure Information       Date/Time: 02/15/25 0745    Procedures:       REMOVAL, HARDWARE, UPPER EXTREMITY (Right: Arm Upper)      INSERTION, INTRAMEDULLARY GALE, HUMERUS (Right: Arm Upper)    Location: [facility] OR 06 / [facility] OR    Surgeons: Eulogio Connors MD          84yF h/o dementia, HTN, HLD, T2DM, CKD3, anemia, hypothyroidism, osteoporosis, p/w GLF onto chair, CTH neg, CT CTL spine T12 burst w/ <50% loss of height, 4mm retropulsion. Presenting for R upper extremity hardware removal, R humerus IM gale.     ALLERGIES:  Allergies   Allergen Reactions    Penicillin G Unknown     Other Reaction(s): Unknown    Penicillins Unknown        MEDICAL HISTORY:  Past Medical History:   Diagnosis Date    Anemia     Arthritis     Diabetes mellitus (Multi)     Disease of thyroid gland     Diverticulitis     [place] (hard of hearing)     Hyperlipidemia     Hypertension     Hypothyroid     Renal disorder         Relevant Problems   Cardiac   (+) Cardiac arrhythmia   (+) Essential hypertension   (+) Mixed hyperlipidemia      Endocrine   (+) Hypothyroidism, unspecified      Hematology   (+) Anemia        SURGICAL HISTORY:  History reviewed. No pertinent surgical history.     MEDICATIONS:  Current Outpatient Medications   Medication Instructions    acetaminophen (TYLENOL) 650 mg, oral, Every 8 hours PRN    apixaban (ELIQUIS) 2.5 mg, oral, 2 times daily    aspirin 81 mg, oral, Daily RT    atorvastatin (LIPITOR) 10 mg, oral, Daily    bisacodyl (DULCOLAX) 10 mg, rectal, Daily    dextromethorphan-guaifenesin  mg/10 mL liquid in packet 10 mL, oral, Every 4 hours PRN    diphenhydrAMINE (ALLERGY (DIPHENHYDRAMINE)) 25 mg, oral, Every 6 hours PRN    docusate sodium (COLACE) 100 mg, oral, 2 times daily PRN    levothyroxine (TIROSINT) 88 mcg, oral, Daily before breakfast    losartan (COZAAR) 100 mg, oral, Daily    magnesium hydroxide (Milk of Magnesia) 400 mg/5 mL suspension 30 mL, oral, Daily  PRN    ondansetron (ZOFRAN) 4 mg, oral, Every 8 hours PRN    pioglitazone (ACTOS) 15 mg, oral, Daily    sodium phosphates (Fleet Enema) 19-7 gram/118 mL enema enema 1 enema, rectal, Daily PRN        VITALS:      2/15/2025     4:00 AM 2/15/2025     3:30 AM 2/15/2025     2:16 AM   Vitals   Systolic 127 128 107   Diastolic 49 61 50   Heart Rate 74 80 65   Resp 14 18 12       LABS:   BMP   Lab Results   Component Value Date    GLUCOSE 118 (H) 02/14/2025    CALCIUM 9.1 02/14/2025     02/14/2025    K 4.9 02/14/2025    CO2 24 02/14/2025     02/14/2025    BUN 38 (H) 02/14/2025    CREATININE 2.16 (H) 02/14/2025   , CBC  Lab Results   Component Value Date    WBC 7.5 02/14/2025    HGB 10.4 (L) 02/14/2025    HCT 33.8 (L) 02/14/2025    MCV 97 02/14/2025     02/14/2025          IMAGES:  EKG          Encounter Date: 12/08/24   ECG 12 lead   Result Value    Ventricular Rate 65    Atrial Rate 65    IN Interval 162    QRS Duration 116    QT Interval 446    QTC Calculation(Bazett) 463    P Axis 66    R Axis 24    T Axis 11    QRS Count 10    Q Onset 219    P Onset 138    P Offset 186    T Offset 442    QTC Fredericia 457    Narrative    Normal sinus rhythm  Incomplete right bundle branch block  When compared with ECG of 21-JUN-2024 16:03,  Sinus rhythm is no longer with 2nd degree AV block  Incomplete right bundle branch block has replaced Right bundle branch block  Reconfirmed by Skyler Blanchard ([id number]) on 12/10/2024 3:00:10 PM          SOCIAL:  Social History     Tobacco Use   Smoking Status Never   Smokeless Tobacco Never      Social History     Substance and Sexual Activity   Alcohol Use Never      Social History     Substance and Sexual Activity   Drug Use Not on file        NPO STATUS:  No data recorded    Clinical Areas Reviewed:   Tobacco  Allergies  Meds   Med Hx  Surg Hx   Fam Hx  Soc Hx        Anesthesia Assessment:    Physical Exam    Airway  Mallampati: III  TM distance: >3 FB     Cardiovascular    Rhythm: regular  Rate: normal     Dental    Pulmonary   Comments: Non labored resp   Abdominal            Anesthesia Plan    History of general anesthesia?: yes  History of complications of general anesthesia?: no    ASA 3     general     The patient is not a current smoker.    intravenous induction   Postoperative administration of opioids is intended.  Trial extubation is planned.  Anesthetic plan and risks discussed with patient.  Use of blood products discussed with patient who consented to blood products.    Plan discussed with CRNA and CAA.

## 2025-02-15 NOTE — PROGRESS NOTES
Pharmacy Medication History Review    Lyndsey Rosado is a 84 y.o. female admitted for Displaced transverse fracture of shaft of humerus, right arm, initial encounter for closed fracture. Pharmacy reviewed the patient's [id number] medications and allergies for accuracy.    The list below reflects the updated PTA list.   Prior to Admission Medications   Prescriptions Last Dose Informant Patient Reported? Taking?   acetaminophen (Tylenol) 325 mg tablet Unknown Other Yes Yes   Sig: Take 2 tablets (650 mg) by mouth every 8 hours if needed for mild pain (1 - 3) or fever (temp greater than 38.0 C).   apixaban (Eliquis) 2.5 mg tablet Unknown Other Yes Yes   Sig: Take 1 tablet (2.5 mg) by mouth 2 times a day.   aspirin 81 mg chewable tablet Unknown Other Yes Yes   Sig: Chew 1 tablet (81 mg) once daily.   atorvastatin (Lipitor) 10 mg tablet Unknown Other Yes Yes   Sig: Take 1 tablet (10 mg) by mouth once daily.   bisacodyl (Dulcolax) 10 mg suppository Unknown Other Yes Yes   Sig: Insert 1 suppository (10 mg) into the rectum once daily.   dextromethorphan-guaifenesin  mg/10 mL liquid in packet Unknown Other Yes Yes   Sig: Take 10 mL by mouth every 4 hours if needed (cough).   diphenhydrAMINE (Allergy, diphenhydrAMINE,) 25 mg capsule Unknown Other Yes Yes   Sig: Take 1 capsule (25 mg) by mouth every 6 hours if needed for itching.   docusate sodium (Colace) 100 mg capsule Unknown Other Yes Yes   Sig: Take 1 capsule (100 mg) by mouth 2 times a day as needed for constipation.   levothyroxine (Tirosint) 88 mcg capsule Unknown Other Yes Yes   Sig: Take 1 capsule (88 mcg) by mouth once daily in the morning. Take before meals.   losartan (Cozaar) 100 mg tablet Unknown Other Yes Yes   Sig: Take 1 tablet (100 mg) by mouth once daily.   magnesium hydroxide (Milk of Magnesia) 400 mg/5 mL suspension Unknown Other Yes Yes   Sig: Take 30 mL by mouth once daily as needed for constipation.   ondansetron (Zofran) 4 mg tablet  "Unknown Other Yes Yes   Sig: Take 1 tablet (4 mg) by mouth every 8 hours if needed for nausea or vomiting.   pioglitazone (Actos) 15 mg tablet Unknown Other Yes Yes   Sig: Take 1 tablet (15 mg) by mouth once daily.   sodium phosphates (Fleet Enema) 19-7 gram/118 mL enema enema Unknown Other Yes Yes   Sig: Insert 118 mL (1 enema) into the rectum once daily as needed for constipation.      Facility-Administered Medications: None        The list below reflects the updated allergy list. Please review each documented allergy for additional clarification and justification.  Allergies  Reviewed by María Rodriguez RN on 2/15/2025        Severity Reactions Comments    Penicillin G Not Specified Unknown Other Reaction(s): Unknown    Penicillins Not Specified Unknown             Patient declines M2B at discharge.     Sources:   [facility] Order Summary Report printed at facility 2/15/2 04:21:32 ET, faxec from facility - uploaded to media tab    Medications ADDED:  Acetaminophen 325mg  Aspirin 81mg  Apixaban 2.5mg  Bisacodyl supp 10mg  Losartan 100mg  Milk of magnesia   Dextromethorphan-guaifenesin 10-100mg/5ml liquid  Diphenhydramine 25mg  Docusate 100mg  Fleet enema 7-19gm/118ml  Ondansetron 4mg  Medications CHANGED:  None  Medications REMOVED/MARKED NOT TAKING:   Olmesartan 40mg  Calcium carbonate-vitamin D3 600mg-20mcg     Additional Comments:  Losartan at SNF replaces patient's outpatient olmesartan    Nisha Peguero, PharmD  Transitions of Care Pharmacist  02/15/25     Secure Chat preferred   If no response call [phone] or Vocera \"Med Rec\"    "

## 2025-02-15 NOTE — CONSULTS
Consults  Date of Service:  2/14/2025 Attending Provider:  Esteban Hernandez MD     Reason for Consultation:  Lyndsey Rosado is being seen today for a consult requested by Esteban Hernandez MD for T12 fracture.    Subjective   History of Present Illness:  Lyndsey is a 84 y.o. female with Displaced transverse fracture of shaft of humerus, right arm, initial encounter for closed fracture    Patient presenting from outside hospital after a fall. Patient found to have T12 compression for which neurosurgery was consulted. In ED patient unable to provide history, but does nod yes when asked about back pain. Denied any new leg weakness.     Review of Systems negative other than listed in HPI.    Objective   Vitals:  There were no vitals filed for this visit.      Exam:  Constitutional: No acute distress  Resp: breathing comfortably  Cardio: well perfused  GI: nondistended  MSK: full range of motion  Neuro: awake  RUE casted  LUE AG  BLE bends knees, wiggles toes  Psych: appropriate  Skin: no obvious lesions    Medical History  Past Medical History:   Diagnosis Date    Anemia     Arthritis     Diabetes mellitus (Multi)     Disease of thyroid gland     Diverticulitis     [place] (hard of hearing)     Hyperlipidemia     Hypertension     Hypothyroid     Renal disorder        Surgical History  No past surgical history on file.     Medications  Current Outpatient Medications   Medication Instructions    atorvastatin (LIPITOR) 10 mg, Daily    calcium carbonate-vitamin D3 600 mg-20 mcg (800 unit) tablet,chewable 1 tablet, Daily    levothyroxine (TIROSINT) 88 mcg, Daily before breakfast    olmesartan (BENICAR) 20 mg, Daily    pioglitazone (ACTOS) 15 mg, Daily        Diagnostic Results:    Lab Results   Component Value Date    WBC 7.5 02/14/2025    HGB 10.4 (L) 02/14/2025    HCT 33.8 (L) 02/14/2025    MCV 97 02/14/2025     02/14/2025     Lab Results   Component Value Date    CREATININE 2.16 (H) 02/14/2025    BUN 38 (H) 02/14/2025      02/14/2025    K 4.9 02/14/2025     02/14/2025    CO2 24 02/14/2025     Lab Results   Component Value Date    INR 1.3 (H) 02/14/2025    INR 1.0 06/21/2024    INR 1.1 08/01/2023    PROTIME 14.2 (H) 02/14/2025    PROTIME 11.4 06/21/2024    PROTIME 12.3 08/01/2023       === 02/14/25 ===    CT UPPER EXTREMITY RIGHT ANGIOGRAM W AND/OR WO IV CONTRAST    - Impression -  Overall suboptimal evaluation of the arteries due to contrast bolus  timing and streak artifact from the patient's orthopedic hardware and  metallic objects overlying the patient's skin.  The radial artery does  opacify down to the palmar arch.  The remaining arteries are poorly  imaged at the level of the orthopedic hardware and the fracture site  in particular.  Injury to the brachial artery cannot be excluded on  this exam.  If desired a more detailed evaluation could likely be  obtained with Doppler ultrasound.  No evidence of arterial extravasation or large hematoma on this exam.  Signed by Joaquin Keller MD      Assessment/Plan   Assessment:  84yF h/o dementia, HTN, HLD, T2DM, CKD3, anemia, hypothyroidism, osteoporosis, p/w GLF onto chair, CTH neg, CT CTL spine T12 burst w/ <50% loss of height, 4mm retropulsion    Plan:  Trauma primary  No acute neurosurgical intervention at this time  Recommend TLSO brace   Recommend upright xrays of T spine AP and Lateral views when able      Janeth Mas MD

## 2025-02-15 NOTE — ED PROVIDER NOTES
Emergency Department Provider Note        History of Present Illness     History provided by: Patient  Limitations to History: None  External Records Reviewed with Brief Summary: I reviewed prior ED visits, Care Everywhere, discharge summaries and outpatient records as appropriate.       HPI:  Lyndsey Rosado is a 84 y.o. female past medical history significant for hypertension, hyperlipidemia, dementia, type 2 diabetes, CKD 3, anemia, hypothyroidism, osteoporosis presenting to the emergency department as a transfer from outside hospital after a fall.  Patient sustained a mechanical fall resulting in injuries including an acute compression fracture of T12 with retropulsion and a significantly displaced right humeral head fracture of the diaphysis that has previous surgical hardware.  Patient arrived hemodynamically stable.  Patient has been accepted for evaluation by trauma surgery and orthopedic surgery.    Physical Exam   Triage vitals:  T    HR 66  BP (!) 108/37  RR 12  O2 100 % Supplemental oxygen    General: Awake, alert, in no acute distress  Eyes: Gaze conjugate.  No scleral icterus or injection  HENT: Normo-cephalic, atraumatic. No stridor  CV: Regular rate, regular rhythm. Radial pulses 2+ bilaterally  Resp: Breathing non-labored, speaking in full sentences.  Clear to auscultation bilaterally  GI: Soft, non-distended, non-tender. No rebound or guarding.  MSK/Extremities: Right shoulder is bandaged.  Palpable 2+ radial pulses on the right.  Sensation intact, range of motion present in all 5 fingers.  Skin: Warm. Appropriate color  Neuro: Alert. Oriented. Face symmetric. Speech is fluent.  Gross strength and sensation intact in b/l UE and LEs  Psych: Appropriate mood and affect    Medical Decision Making & ED Course   Medical Decision Makin y.o. female presenting to the emergency department to be evaluated after a fall.  Patient is a transfer from outside hospital and was excepted as a trauma  consult.  Patient's injuries are notable for a right humerus fracture and a T12 burst fracture.  I consulted orthopedic surgery for the humerus fracture, neurosurgery for the T12 fracture and trauma surgery for the mechanism of injury.  Patient given pain medication and IV fluids, was consented for procedural sedation for reduction and splinting of her right upper extremity fracture.  See procedure note for full detail.  Patient will be admitted to trauma surgery with additional surgical consults following for multiple injuries.  She is a GCS of 15 and otherwise hemodynamically stable, neurovascularly intact in that right upper extremity pre and post splinting.  Patient admitted to trauma surgery in stable condition.  ----  Social Determinants of Health which Significantly Impact Care: None identified     EKG Independent Interpretation: EKG interpreted by myself. Please see ED Course for full interpretation.    Independent Result Review and Interpretation: Relevant laboratory and radiographic results were reviewed and independently interpreted by myself.  As necessary, they are commented on in the ED Course.    Chronic conditions affecting the patient's care: As documented above in [facility]    The patient was discussed with the following consultants/services:  Orthopedic surgery, neurosurgery and trauma surgery    Care Considerations: As documented above in [facility]    ED Course:  ED Course as of 02/15/25 0347   Sat Feb 15, 2025   0112 EKG performed at 1258, normal sinus rhythm with rate of 67.  Right bundle branch block.  No ST elevation or pression, no T wave abnormalities. [PW]      ED Course User Index  [PW] Chantale Parmar DO         Diagnoses as of 02/15/25 0347   Syncope, near   Fall in elderly patient   Closed comminuted fracture of right humerus, initial encounter   T12 burst fracture (Multi)     Disposition   As a result of their workup, the patient will require admission to the hospital.  The patient was informed of  her diagnosis.  The patient was given the opportunity to ask questions and I answered them. The patient agreed to be admitted to the hospital.    Procedures   Procedures    Patient seen and discussed with ED attending physician.    Chantale Parmar DO  Emergency Medicine       Chantale Parmar DO  Resident  02/15/25 0837

## 2025-02-15 NOTE — ANESTHESIA POSTPROCEDURE EVALUATION
Patient: Lyndsey Rosado    Procedure Summary       Date: 02/15/25 Room / Location: [facility] OR 06 / [facility] OR    Anesthesia Start: 0805 Anesthesia Stop: 1100    Procedures:       REMOVAL, HARDWARE, UPPER EXTREMITY (Right: Arm Upper)      INSERTION, INTRAMEDULLARY GALE, HUMERUS (Right: Arm Upper) Diagnosis:       Displaced transverse fracture of shaft of humerus, right arm, initial encounter for closed fracture      (Displaced transverse fracture of shaft of humerus, right arm, initial encounter for closed fracture [S42.321A])    Surgeons: Eulogio Connors MD Responsible Provider: Trino Baron MD    Anesthesia Type: general ASA Status: 3            Anesthesia Type: general    Vitals Value Taken Time   /72 02/15/25 1355   Temp 36.3 °C (97.3 °F) 02/15/25 1355   Pulse 71 02/15/25 1355   Resp 15 02/15/25 1355   SpO2 100 % 02/15/25 1355       Anesthesia Post Evaluation    Patient location during evaluation: PACU  Patient participation: complete - patient participated  Level of consciousness: awake  Pain management: adequate  Airway patency: patent  Cardiovascular status: acceptable  Respiratory status: acceptable  Hydration status: acceptable  Postoperative Nausea and Vomiting: none        No notable events documented.

## 2025-02-15 NOTE — OP NOTE
REMOVAL, HARDWARE, UPPER EXTREMITY (R), INSERTION, INTRAMEDULLARY GALE, HUMERUS (R) Operative Note     Date: 2/15/2025  OR Location: [facility] OR    Name: Lyndsey Rosado, : 1940, Age: 84 y.o., MRN: [id number], Sex: female    Diagnosis  Pre-op Diagnosis      * Displaced transverse fracture of shaft of humerus, right arm, initial encounter for closed fracture [S42.321A] Post-op Diagnosis     * Displaced transverse fracture of shaft of humerus, right arm, initial encounter for closed fracture [S42.321A]     Procedures  REMOVAL, HARDWARE, UPPER EXTREMITY  92774 - [place] REMOVAL IMPLANT DEEP    INSERTION, INTRAMEDULLARY GALE, HUMERUS  10160 - [place] HUMRAL SHAFT FX W/INSJ IMED IMPLT W/W CERCLGE    Radial nerve neurolysis    Surgeons      * Eulogio Connors - Primary    Resident/Fellow/Other Assistant:  Surgeons and Role:     * Bethel Salamanca MD - Fellow    Dr. Salamanca participated in this case as the assistant, performing components of the positioning, approach, debridement, reduction, fixation, and closure. Due to the nature and complexity of the case, no qualified resident of an appropriate level was available to assist.    Staff:   Circulator: Ermelinda Ellison Person: Lynn    Anesthesia Staff: Anesthesiologist: Trino Baron MD  CRNA: MAGGIE Chavez-CRNA  Anesthesia Resident: Frances Alvarez MD    Procedure Summary  Anesthesia: General  ASA: III  Estimated Blood Loss: 150mL  Intra-op Medications:   Administrations occurring from 0745 to 1105 on 02/15/25:   Medication Name Total Dose   sodium chloride 0.9 % irrigation solution 2,000 mL   vancomycin (Vancocin) vial for injection 1 g   tobramycin (Nebcin) injection 1,200 mg   perflutren lipid microspheres (Definity) injection 0.5-10 mL of dilution Cannot be calculated   perflutren protein A microsphere (Optison) injection 0.5 mL Cannot be calculated   sulfur hexafluoride microsphr (Lumason) injection 24.28 mg Cannot be calculated   propofol  (Diprivan) injection 50 mg 110 mg   albumin human 5 % 250 mL   ceFAZolin (Ancef) vial 1 g 2 g   dexAMETHasone (Decadron) injection 4 mg/mL 8 mg   fentaNYL (Sublimaze) injection 50 mcg/mL 100 mcg   HYDROmorphone (Dilaudid) injection 1 mg/mL 0.5 mg   lidocaine (Xylocaine) injection 2 % 100 mg   metoprolol tartrate (Lopressor) injection 3 mg   ondansetron (Zofran) 2 mg/mL injection 4 mg   rocuronium (ZeMuron) 50 mg/5 mL injection 60 mg   sugammadex (Bridion) 200 mg/2 mL injection 200 mg   tranexamic acid (Cyklokapron) injection 1,000 mg              Anesthesia Record               Intraprocedure I/O Totals          Intake    Tranexamic Acid 0.00 mL    The total shown is the total volume documented since Anesthesia Start was filed.    Total Intake 0 mL       Output    Est. Blood Loss 150 mL    Total Output 150 mL       Net    Net Volume -150 mL          Specimen: No specimens collected              Drains and/or Catheters:   External Urinary Catheter Female (Active)       Tourniquet Times:         Implants:  Implants       Type Name Action Serial No.      Screw WIRE, MONSE 3 X 285 - [id number] Used, Not Implanted      Nail TORNIER LONG HUMERAL NAIL, [id number]- RIGHT Implanted [id number]     Screw PLATE, 2.4 X 52 6H LCP - [id number] Used, Not Implanted      Screw SCREW, CORTEX SELF TAPPING T8 SDR 2.4MM X 8MM - [id number] Used, Not Implanted      Screw SCREW, LOCKING SELF TAP W/RECESS 2.4MM X 8MM - [id number] Used, Not Implanted      Joint NAIL, HUMERAL, AEQUALIS, PROXIMAL SCREW, 5 X 40MM - [id number] Implanted      Joint NAIL, HUMERAL, AEQUALIS, PROXIMAL SCREW, 5 X 36MM - [id number] Implanted      Joint NAIL, HUMERAL, AEQUALIS, DISTAL SCREW, SHERRON 4, 3MM X 22MM, LONG - [id number] Implanted      Joint NAIL, HUMERAL, AEQUALIS, DISTAL SCREW, SHERRON 4, 3MM X 20MM LONG - [id number] Implanted               Findings: Closed, displaced, perimplant fracture of the right distal third humerus.  Adequate reduction and appropriately  placed hardware.    Indications: Lyndsey Rosado is an 84 y.o. female who is having surgery for Displaced transverse fracture of shaft of humerus, right arm, initial encounter for closed fracture [S42.967A].  Patient has a history of anemia, arthritis, type 2 diabetes and chronic kidney disease who sustained a syncopal fall resulting in the injury noted above.  Her prior surgical intervention occurred in 2007.  She has had no issues with her arm since her previous surgical intervention.  Operative versus nonoperative management was discussed with the patient and her family who are in agreement with surgical intervention.    The patient was seen in the preoperative area. The risks, benefits, complications, treatment options, non-operative alternatives, expected recovery and outcomes were discussed with the patient. The possibilities of reaction to medication, pulmonary aspiration, injury to surrounding structures, bleeding, recurrent infection, the need for additional procedures, failure to diagnose a condition, and creating a complication requiring transfusion or operation were discussed with the patient. The patient concurred with the proposed plan, giving informed consent.  The site of surgery was properly noted/marked if necessary per policy. The patient has been actively warmed in preoperative area. Preoperative antibiotics have been ordered and given within 1 hours of incision. Venous thrombosis prophylaxis have been ordered including bilateral sequential compression devices    Procedure Details:   The patient was subsequently taken back to the operating room where initial timeout was performed including the patient's name, date of birth, MRN, procedure to be performed, correct laterality which agreed upon by the entire surgical staff.  The patient was then intubated in the supine position as per anesthesia protocol.  She was transitioned to the operating table she was maintained in supine position with all  bony prominences well-padded.  Bump was placed under the ipsilateral scapula.  She was then prepped and draped in usual sterile fashion.  The bed was placed in approximately 20 degrees of reverse Trendelenburg.      A preincision surgical pause performed ensuring administration of IV antibiotics and TXA as well as the correct laterality and procedure.  We identified her previous surgical site and utilized this entire incision which was approximately 8 cm in length over the anterolateral aspect of the upper extremity.  Deeper dissection was performed utilizing electrocautery.  The brachialis was split.  At this point we were able to identify the in situ hardware.  All 6 screw heads were exposed.  The screw heads were debrided utilizing a curette and electrocautery.  They were all then removed en bloc without issue.  The plate was then removed.  We identified the radial nerve which was noted to be intact at the fracture site.  At this point, we identified the fracture itself and performed an open reduction utilizing a 2.4 mm LCDC type plate which was secured utilizing locking nonlocking screw fixation.  All screws were unicortical as this was utilized as a reduction aid.  We then made an approximately 4 cm incision over the deltoid utilizing a 10 blade.  Deeper dissection was performed utilizing electrocautery.  We were able to visualize the raphae between the anterior middle heads of the deltoid and exploit this for intramuscular plane.  The rotator cuff was noted to be atrophic at the site.  We then selected the guidepin and were able to obtain an appropriate pin position which was confirmed with both Grashey and Scap Y fluoroscopic views.  This was then advanced with power.  Opening reamer was inserted and utilized over the guidewire.  The reamer and guidepin were removed and a smooth long guidewire was inserted into the humeral canal.  This was advanced to the level of the fracture site.  The guidewire was able to  be smoothly passed into the distal aspect of the humeral canal.  This measured approximately 280mm and we elected to use a 270 mm nail.  We sequentially reamed up to a 9.5 mm reamer and elected to use an 8 mm nail.  We selected 8.0mm x 270mm proximal humeral nail and inserted this over our guidewire.  This was malleted to an appropriate depth.  Appropriate nail position was confirmed with fluoroscopy.  We used the aiming jig to place 2 proximal locking screws within the humeral head.  Fluoroscopy obtained demonstrated that none of the screws violated the articular surface.  We turned our attention distally and made a longitudinal incision at the level of the distal interlock holes.  Blunt dissection was taken down until the humerus was palpable.  We then placed 2 anterior to posterior distal interlock screws using freehand perfect [place] technique.  These were placed bicortically and had excellent purchase.  We obtained our final fluoroscopic images which demonstrated maintained fracture reduction as well as safe extra-articular hardware placement.  We copiously irrigated all wounds with normal saline.  The fascial layer of the anterolateral incision as well as of the deltoid were closed utilizing an 0 PDS in a running fashion.  The deep dermal layer of all incisions was closed with 2-0 Monocryl suture and the skin closed with 2-0 nylon in an interrupted vertical mattress fashion.  All counts were correct x2 at this time.  Sterile dressings were applied.  The drapes were taken down and the patient was awoken from anesthesia without complication. They were transferred back to their hospital bed and taken to PACU in stable condition.    Patient will be weightbearing as tolerated and range of motion as tolerated on the operative extremity.  She is to receive IV antibiotics for 24 hours while in house.  We recommend 81 mg of oral aspirin twice daily for DVT prophylaxis unless the patient is on underlying anticoagulation  which she may resume 24 hours postoperatively.  She is to return to our clinic in 2 to 3 weeks for repeat evaluation, suture removal, and repeat radiographs of the right shoulder including an AP, scapular Y, axillary lateral, and grashey as well as AP and lateral radiographs of the humerus.    Complications:  None; patient tolerated the procedure well.    Disposition: PACU - hemodynamically stable.  Condition: stable       Attending Attestation: I was present for the entire procedure.    Eulogio Connors  Phone Number: [phone]

## 2025-02-16 VITALS
HEART RATE: 95 BPM | DIASTOLIC BLOOD PRESSURE: 67 MMHG | RESPIRATION RATE: 16 BRPM | SYSTOLIC BLOOD PRESSURE: 155 MMHG | OXYGEN SATURATION: 90 % | TEMPERATURE: 97.9 F

## 2025-02-16 PROCEDURE — 97161 PT EVAL LOW COMPLEX 20 MIN: CPT | Mod: GP

## 2025-02-16 PROCEDURE — 97165 OT EVAL LOW COMPLEX 30 MIN: CPT | Mod: GO

## 2025-02-16 PROCEDURE — 1200000002 HC GENERAL ROOM WITH TELEMETRY DAILY

## 2025-02-16 PROCEDURE — 99232 SBSQ HOSP IP/OBS MODERATE 35: CPT | Performed by: STUDENT IN AN ORGANIZED HEALTH CARE EDUCATION/TRAINING PROGRAM

## 2025-02-16 PROCEDURE — 2500000001 HC RX 250 WO HCPCS SELF ADMINISTERED DRUGS (ALT 637 FOR MEDICARE OP)

## 2025-02-16 PROCEDURE — 2500000004 HC RX 250 GENERAL PHARMACY W/ HCPCS (ALT 636 FOR OP/ED)

## 2025-02-16 RX ORDER — LYSINE HCL 500 MG
1 TABLET ORAL 2 TIMES DAILY
Qty: 30 EACH | Refills: 0 | Status: SHIPPED | OUTPATIENT
Start: 2025-02-16 | End: 2025-02-18

## 2025-02-16 RX ORDER — CEFAZOLIN SODIUM 2 G/100ML
2 INJECTION, SOLUTION INTRAVENOUS EVERY 12 HOURS
Status: COMPLETED | OUTPATIENT
Start: 2025-02-16 | End: 2025-02-16

## 2025-02-16 RX ADMIN — OXYCODONE 5 MG: 5 TABLET ORAL at 13:35

## 2025-02-16 RX ADMIN — CEFAZOLIN SODIUM 2 G: 2 INJECTION, SOLUTION INTRAVENOUS at 20:45

## 2025-02-16 RX ADMIN — ACETAMINOPHEN 975 MG: 325 TABLET, FILM COATED ORAL at 13:35

## 2025-02-16 RX ADMIN — CEFAZOLIN SODIUM 2 G: 2 INJECTION, SOLUTION INTRAVENOUS at 13:17

## 2025-02-16 ASSESSMENT — COGNITIVE AND FUNCTIONAL STATUS - GENERAL
PERSONAL GROOMING: A LOT
MOVING TO AND FROM BED TO CHAIR: A LOT
MOBILITY SCORE: 6
CLIMB 3 TO 5 STEPS WITH RAILING: TOTAL
MOVING TO AND FROM BED TO CHAIR: TOTAL
DAILY ACTIVITIY SCORE: 12
HELP NEEDED FOR BATHING: TOTAL
MOVING FROM LYING ON BACK TO SITTING ON SIDE OF FLAT BED WITH BEDRAILS: TOTAL
PERSONAL GROOMING: A LOT
DRESSING REGULAR LOWER BODY CLOTHING: TOTAL
MOVING FROM LYING ON BACK TO SITTING ON SIDE OF FLAT BED WITH BEDRAILS: A LOT
EATING MEALS: A LOT
TOILETING: TOTAL
TURNING FROM BACK TO SIDE WHILE IN FLAT BAD: A LOT
HELP NEEDED FOR BATHING: A LOT
DRESSING REGULAR UPPER BODY CLOTHING: TOTAL
TURNING FROM BACK TO SIDE WHILE IN FLAT BAD: TOTAL
STANDING UP FROM CHAIR USING ARMS: TOTAL
WALKING IN HOSPITAL ROOM: A LOT
DRESSING REGULAR LOWER BODY CLOTHING: A LOT
STANDING UP FROM CHAIR USING ARMS: A LOT
WALKING IN HOSPITAL ROOM: TOTAL
CLIMB 3 TO 5 STEPS WITH RAILING: TOTAL
DRESSING REGULAR UPPER BODY CLOTHING: A LOT
DAILY ACTIVITIY SCORE: 8
TOILETING: A LOT
MOBILITY SCORE: 11
EATING MEALS: A LOT

## 2025-02-16 ASSESSMENT — ACTIVITIES OF DAILY LIVING (ADL)
BATHING_ASSISTANCE: TOTAL
BATHING_ASSISTANCE: TOTAL

## 2025-02-16 ASSESSMENT — PAIN SCALES - GENERAL
PAINLEVEL_OUTOF10: 0 - NO PAIN
PAINLEVEL_OUTOF10: 8

## 2025-02-16 NOTE — NURSING NOTE
Pt refused morning medications and screamed NO and to leave her alone. RN attempted blood sugar check, pt refused and screamed at RN to leave her alone. RN educated pt on refusal of both. Care ongoing.

## 2025-02-16 NOTE — PROGRESS NOTES
Orthopaedic Surgery Progress Note    Subjective:  Recovering on floor. Refusing exam/blood.     Objective:  Vitals:    02/16/25 0531   BP: 143/60   Pulse: 96   Resp: 16   Temp: 35.6 °C (96 °F)   SpO2: 100%       Physical Exam    - Constitutional: Noncooperative   - Eyes: EOM grossly intact  - Head/Neck: Trachea midline  - Respiratory/Thorax: NWOB  - Cardiovascular: RRR on peripheral palpation  - Gastrointestinal: Nondistended  - Psychological: Dementia baseline  - Skin: Warm and dry. Additional findings in musculoskeletal evaluation  - Musculoskeletal:  ---      Right upper extremity:  -  dressings cdi   - not participating fully exam but extending thumb, wiggling fingers  - hand wwp     Results for orders placed or performed during the hospital encounter of 02/14/25 (from the past 24 hours)   POCT GLUCOSE   Result Value Ref Range    POCT Glucose 101 (H) 74 - 99 mg/dL   POCT GLUCOSE   Result Value Ref Range    POCT Glucose 127 (H) 74 - 99 mg/dL   POCT GLUCOSE   Result Value Ref Range    POCT Glucose 135 (H) 74 - 99 mg/dL       FL fluoro images no charge   Final Result      FL less than 1 hour   Final Result      XR lumbar spine 2-3 views   Final Result   1.Compression fracture of T12 of undetermined age.   2.Multilevel degenerative disc changes are noted in the thoracic   and lumbar spine more prominent at L5-S1 .   Signed by Ganga Andrews MD      XR humerus right   Final Result   Acute transverse fracture through the distal third of the right   humeral diaphysis as demonstrated on earlier elbow radiographs.   Signed by Roderick Rivera MD      XR thoracic spine 2 views   Final Result   1.Compression fracture of T12 of undetermined age.   2.Multilevel degenerative disc changes are noted in the thoracic   and lumbar spine more prominent at L5-S1 .   Signed by Ganga Andrews MD      XR shoulder right 2+ views   Final Result   No acute osseous abnormality of the shoulder girdle.  Partially imaged   humerus fracture  noted.   Signed by Joaquin Keller MD      XR elbow right 1-2 views   Final Result   Acute fracture of the humerus just below the level of the orthopedic   hardware.  Approximately 45 degrees of posterior angulation and 2   shaft widths of lateral displacement.   Signed by Joaquin Keller MD      FL less than 1 hour    (Results Pending)         Assessment:  84-year-old female with a past medical history of hypertension, type 2 diabetes, chronic kidney disease stage III, ORIF right humerus in 2007 who presents with a right fernando-implant distal third humeral shaft fracture after syncopal ground-level fall.     Now s/p ORIF R humerus w Dr. Connors 2/15    Plan:  - Weight-bearing status: WBAT ROMAT RUE  -No plan for RTOR   - PT/OT  - Follow up with Dr. Connors in 2 weeks    D/w Dr. Connors      While admitted, this patient will be followed by the Ortho Trauma Team. Please contact the residents listed below with any questions (available via Epic Chat weekdays). Please page [phone] (ortho on-call) after 6pm and on weekends.    Ortho Trauma  First Call: Gustavo Gilliland, PGY-1  Second Call: Leana Almendarez, PGY-2  Third Call: Akash Lambert, PGY-3      6pm-6am M-F, holidays, weekends please contact on-call resident @ [phone] w/ urgent questions/concerns.    Akash Lambert MD  Orthopedic Surgery, PGY-3  On-call Resident (on call pager [phone])

## 2025-02-16 NOTE — DISCHARGE INSTRUCTIONS
Orthopaedic Surgery Discharge Instructions    Weight bearing status: You may bear weight as tolerated on your right arm. You may range your arm as tolerated.     Resume normal diet     Leave operative dressing in place until 2/22/2025. Then remove and leave incision open to air. Let water run freely over incision when showering, do not scrub. Do not soak in pool or tub. Do not swim in pools or ponds until 3 months after surgery.    Call if any drainage after 7 days, increased redness/warmth/swelling at incision site, pain/tenderness of calf, swelling of calf that does not respond to elevation, SOB/chest pain.    Call for any questions or concerns.     Follow up with Dr. Connors in 2 weeks. Call [phone] to schedule/confirm appointment.

## 2025-02-16 NOTE — PROGRESS NOTES
"   02/16/25 1037   Discharge Planning   Living Arrangements Alone   Support Systems Children   Assistance Needed walker   Type of Residence Private residence  (assisted living)   Expected Discharge Disposition SNF       Spoke with son Tyree (youngest son) (also has son Timoteo). Patient with multiple falls over past 6 months, with recent admit to [facility], she did discharge to a new environment ; [facility], where she subsequently had a fall.     R Humeras FX; s/p Ortho  T12 Compression, TSLO brace, no surgery    Awiating PT OT    Son has concerns as patient seems more \"hostile\" towards even son himself, he is questioning if dementia is worsening; this writer did discuss multiple admits, and living changes over course of short time, and with new admit, pain medications/pain and likely lack of sleep, that delirium could be an issue over the dementia as well. Explained that advancing dementia dx is not done in hospital, but if there are concerns will discuss with Attending team adding geriatrics if indicated.     Son prefers trying to go to [facility] again, secondary if unable [facility] does have skilled locations he would be open to as well.   "

## 2025-02-16 NOTE — PROGRESS NOTES
[facility]  TRAUMA SERVICE - PROGRESS NOTE    Patient Name: Lyndsey Rosado  MRN: [id number]  Admit Date: 214  : 1940  AGE: 84 y.o.   GENDER: female  ==============================================================================  MECHANISM OF INJURY:   84 F presents as a trauma consult, transfer from outside hospital after a fall at nursing facility. Patient states she turned, lost her balance, and fell onto her right side. Patient denies blood thinner use, headstrike, and loss of consciousness. Imaging obtained at outside hospital shows right humerus fracture and T12 compression fracture. Patient alert and oriented x 3 with intermittent confusion.      LOC (yes/no?): no  Anticoagulant / Anti-platelet Rx? (for what dx?): denies  Referring Facility Name (N/A for scene EMR run): [facility]    INJURIES:   R humerus fx  T12 compression fx    OTHER MEDICAL PROBLEMS:  SHEILA  HTN, HLD  Hypothyroidism  T2DM  CKD III (baseline Cr ~ 1.5)  Dementia    INCIDENTAL FINDINGS:  NA    PROCEDURES:  2/15: R humerus IMN    ==============================================================================  TODAY'S ASSESSMENT AND PLAN OF CARE:  R humerus fx  S/p IMN 2/15  WBAT ROMAT RUE   Follow up with Dr. Connors in 2 weeks   Multimodal pain control: Tylenol scheduled, Oxy 2.5/5 every 4 hours as needed  T12 compression fx  NSGY rec: no acute intervention   T/L spine uprights - stable  TLSO brace while out of bed  Follow up in 2 and 6 weeks  Syncope   Geriatrics consulted  Troponin negative  EKG: NSR w/ RBBB, similar to previous   Will get orthostatic VS (if patient cooperative)  SHEILA on CKDIII (baseline Cr ~1.5)   Cr in Ed 2.16  Maintenance fluids  Delirium  Hx Dementia   Melatonin nightly  Cooperative with physical exams, but otherwise not cooperating with blood draws or vitals; is refusing meds  A&Ox2 at baseline with poor short term memory   Hx HTN, HLD  Holding home losartan 2/2 soft BP   Home  Script sent to updated pharmacy   atorvastatin  Takes eliquis (?), ASA- holding  Hx T2DM  SSI  Hx Hypothyroidism  Home levothyroxine 88mcg daily    DVT ppx: SubQ heparin   WBS: WBAT ROMAT RUE   Diet: regular, CCD  Pain regimen: as above  Bowel regimen: MiraLAX daily, Trinity-Colace twice daily  PT/OT: moderate   Dispo: SNF placement    Tanna ZengDO, PGY1  [id number] / Epic Chat    ==============================================================================  CHIEF COMPLAINT / OVERNIGHT EVENTS:   OVNE: none   Patient doing well, has some abdominal discomfort and states she is not hungry and does not want to eat. She seems confused. Is cooperative with physical exam but adamant that she does not want her blood drawn or vitals taken. She is also refusing to take her meds.   Nursing spoke with family, who report patient has moderate dementia with poor short term memory. Last MoCA from 2 years ago 11/30. They report she is A&Ox2 at baseline.     MEDICAL HISTORY / ROS:  Admission history and ROS reviewed.     PHYSICAL EXAM:  Heart Rate:  [54-96]   Temp:  [35.6 °C (96 °F)-36.7 °C (98.1 °F)]   Resp:  [10-18]   BP: ()/(43-73)   SpO2:  [92 %-100 %]   Physical Exam  HENT:      Head: Normocephalic.      Mouth/Throat:      Mouth: Mucous membranes are moist.   Eyes:      Extraocular Movements: Extraocular movements intact.   Cardiovascular:      Rate and Rhythm: Normal rate.      Pulses: Normal pulses.   Pulmonary:      Effort: Pulmonary effort is normal. No respiratory distress.   Abdominal:      General: There is no distension.      Palpations: Abdomen is soft.   Musculoskeletal:      Comments: Moving LUE, LLE, RLE; not moving RUE (wrapped with ACE wrap); R hand swelling, distal sensation intact, patient attempts to move her right fingers with minimal motion   Skin:     General: Skin is warm and dry.      Capillary Refill: Capillary refill takes less than 2 seconds.   Neurological:      Mental Status: She is alert.      Comments: Confused, agitated,  yelling at providers; nonviolent         IMAGING SUMMARY:  (summary of new imaging findings, not a copy of dictation)  XR T/L Spine: T12 compression fx      LABS:  Results from last 7 days   Lab Units 02/14/25  1746   WBC AUTO x10*3/uL 7.5   HEMOGLOBIN g/dL 10.4*   HEMATOCRIT % 33.8*   PLATELETS AUTO x10*3/uL 223   NEUTROS PCT AUTO % 74.8   LYMPHS PCT AUTO % 14.5   MONOS PCT AUTO % 8.9   EOS PCT AUTO % 0.9     Results from last 7 days   Lab Units 02/14/25  1746   INR  1.3*     Results from last 7 days   Lab Units 02/14/25  1746   SODIUM mmol/L 138   POTASSIUM mmol/L 4.9   CHLORIDE mmol/L 105   CO2 mmol/L 24   BUN mg/dL 38*   CREATININE mg/dL 2.16*   CALCIUM mg/dL 9.1   PROTEIN TOTAL g/dL 6.7   BILIRUBIN TOTAL mg/dL 0.7   ALK PHOS U/L 74   ALT U/L 8   AST U/L 13   GLUCOSE mg/dL 118*     Results from last 7 days   Lab Units 02/14/25  1746   BILIRUBIN TOTAL mg/dL 0.7           I have reviewed all medications, laboratory results, and imaging pertinent for today's encounter.

## 2025-02-16 NOTE — SIGNIFICANT EVENT
Patient with T12 burst fracture with stable alignment, stable neurologic exam. No acute neurosurgical intervention or additional neuroimaging needed at this time. Recommend patient maintain in TLSO brace while out of bed. Ok to work with PTOT. Patient needs follow up in 2 and 6 weeks, we will arrange. Thank you for allowing us to participate in the care of this patient. Will sign off at this time. Please page [phone] with any questions or concerns.    Janeth Mas MD  PGY-3 Neurosurgery  6:16 AM

## 2025-02-16 NOTE — PROGRESS NOTES
Occupational Therapy    Evaluation    Patient Name: Lyndsey Rosado  MRN: [id number]  Department: [place] [address]  Room: [facility]8017-A  Today's Date: 2/16/2025  Time Calculation  Start Time: 0908  Stop Time: 0921  Time Calculation (min): 13 min    Assessment  IP OT Assessment  OT Assessment: pt presents s/p fall with RUE fx, T12 fx limiting ability to perform ADLs and transfers.  Barriers to Discharge Home: Caregiver assistance, Cognition needs, Physical needs  Caregiver Assistance: Patient lives alone and/or does not have reliable caregiver assistance  Cognition Needs: 24hr supervision for safety awareness needed, Insight of patient limited regarding functional ability/needs  Physical Needs: 24hr mobility assistance needed, 24hr ADL assistance needed  Evaluation/Treatment Tolerance: Patient limited by pain  Medical Staff Made Aware: Yes  End of Session Communication: Bedside nurse  End of Session Patient Position: Bed, 3 rail up, Alarm off, not on at start of session  Plan:  Treatment Interventions: ADL retraining, Functional transfer training, UE strengthening/ROM, Endurance training, Cognitive reorientation  OT Frequency: 3 times per week  OT Discharge Recommendations: Moderate intensity level of continued care  OT Recommended Transfer Status: Dependent  OT - OK to Discharge: Yes (eval complete and d/c rec made)    Subjective   Current Problem:  1. Syncope, near  calcium carbonate-vit D3-min 600 mg-10 mcg (400 unit) tablet    CANCELED: Transthoracic Echo (TTE) Complete    CANCELED: Transthoracic Echo (TTE) Complete    CANCELED: Transthoracic Echo (TTE) Complete    CANCELED: Transthoracic Echo (TTE) Complete    CANCELED: Transthoracic Echo (TTE) Complete    CANCELED: Transthoracic Echo (TTE) Complete      2. Displaced transverse fracture of shaft of humerus, right arm, initial encounter for closed fracture  Case Request Operating Room: REMOVAL, HARDWARE, UPPER EXTREMITY, INSERTION, INTRAMEDULLARY GALE, HUMERUS    Case  Request Operating Room: REMOVAL, HARDWARE, UPPER EXTREMITY, INSERTION, INTRAMEDULLARY GALE, HUMERUS      3. Fall in elderly patient  CANCELED: Transthoracic Echo (TTE) Complete    CANCELED: Transthoracic Echo (TTE) Complete    CANCELED: Transthoracic Echo (TTE) Complete    CANCELED: Transthoracic Echo (TTE) Complete    CANCELED: Transthoracic Echo (TTE) Complete    CANCELED: Transthoracic Echo (TTE) Complete      4. Closed comminuted fracture of right humerus, initial encounter        5. T12 burst fracture (Multi)          General:  General  Reason for Referral: right fernando-implant distal third humeral shaft fracture after syncopal ground-level fall.  Now s/p ORIF R humerus w Dr. Connors 2/15. T12 burst fracture with stable alignment (no need for surgery, per NSG pt needs TLSO for OOB)  Past Medical History Relevant to Rehab: hypertension, type 2 diabetes, chronic kidney disease stage III, ORIF right humerus in 2007  Family/Caregiver Present: No  Co-Treatment: PT  Co-Treatment Reason: AMPAC <10  Prior to Session Communication: Bedside nurse  Patient Position Received: Bed, 3 rail up  General Comment: Pt supine in bed, initially stating wanting to get up and move but once therapists were introduced pt adamantly refusing all attempts at mobility despite education on therapy role/purpose and importance of mobility. TLSO not yet in room.  Precautions:  Hearing/Visual Limitations: vision appears intact, slightly [place]  UE Weight Bearing Status: Weight Bearing as Tolerated (RUE)  Medical Precautions: Fall precautions  Precautions Comment: TLSO for OOB activity, RUE ROMAT and WBAT           Pain:  Pain Assessment  Pain Assessment:  (Pt stating generalized pain, not rated)    Objective   Cognition:  Overall Cognitive Status: Impaired, Impaired at baseline  Orientation Level: Disoriented to place, Disoriented to time, Disoriented to situation  Cognition Comments: Pt with baseline dementia. Pt appears confused, was unaware of  "having surgery on RUE. Poor historian, unable to provide PLOF or home setup. Perseverating on pain, has been refusing all aspects of care per RN.  Insight: Severe           Home Living:  Type of Home: Assisted living  Home Adaptive Equipment:  (unknown)  Home Living Comments: Pt unable to state PLOF, reported being at a facility however \"never\" getting OOB, not walking, never being in chair. Per SW note, pt [facility] and was discharged to an Assisted Living facility. Typically is ambulatory and somewhat independent with ADLs.   Prior Function:  Level of [place]: Unable to asses at this time  Prior Function Comments: Pt poor historian. Following info obtained from OT shai in December 2024: \"Pt lives alone in house with 1 GEE. 1 floor set up. Sleeps in adjustable bed/ SPC use at all times, owns FWW. Has visiting angels 5x/wk for 5 hrs/day. Seems as though they mainly just help with housekeeping and transportation. Pt is alone on the weekend but states she is able to manage. Has WIS with chair and Gbs. IND with ADLs.\"  IADL History:     ADL:  Eating Assistance: Moderate  Eating Deficit:  (anticipated)  Grooming Assistance: Maximal  Grooming Deficit:  (anticipated)  Bathing Assistance: Total  Bathing Deficit:  (anticipated)  UE Dressing Assistance: Total  UE Dressing Deficit:  (anticipated)  LE Dressing Assistance: Total  LE Dressing Deficit:  (anticipated)  Toileting Assistance with Device: Total  Toileting Deficit: Incontinent  Activity Tolerance:     Bed Mobility/Transfers: Bed Mobility  Bed Mobility: Yes  Bed Mobility 1  Bed Mobility 1: Scooting  Level of Assistance 1: Dependent, +2  Bed Mobility Comments 1: pt dependently scooted up in bed with A x 2, pt screaming in pain. Pt not allowing any other HOB elevation in attempts for EOB activity. Pt highly agitated with any attempt to perform mobility this date, stating pain, pt also very confused.       Vision: Vision - Basic " "Assessment  Current Vision:  (vision appears WFL)  Sensation:  Light Touch:  (pt reports sensation intact in R fingers, pt ace wrapped from MCPs to armpit. pt hypersensitive to any touch to RUE, screaming out in pain)  Strength:  Strength Comments: LUE at least 3-/5, RUE did not move, pt would not attempt to wiggle fingers due to pain  Perception:  Inattention/Neglect: Appears intact  Coordination:  Movements are Fluid and Coordinated: No   Hand Function:  Hand Function  Gross Grasp: Impaired  Coordination: Impaired  Extremities: RUE   RUE :  (Pt only allowed OT to extend fingers enough to put rolled washcloth to promote fxl hand position and in attempt to prevent any tightness of R hand. Pt screaming with movement of fingers. Would  not allow any other ROM of RUE despite educ of importance) and LUE   LUE:  (Pt lifts LUE off bed however demos mod/severe tremor. pt states tremor is \"normal\". Unable to formally assess strength due to pt agitation)      Outcome Measures: [facility] Daily Activity  Putting on and taking off regular lower body clothing: Total  Bathing (including washing, rinsing, drying): Total  Putting on and taking off regular upper body clothing: Total  Toileting, which includes using toilet, bedpan or urinal: Total  Taking care of personal grooming such as brushing teeth: A lot  Eating Meals: A lot  Daily Activity - Total Score: 8      Education Documentation  Home Exercise Program, taught by Ivanna Coker OT at 2/16/2025  1:06 PM.  Learner: Patient  Readiness: Refuses  Method: Explanation  Response: Needs Reinforcement    Precautions, taught by Ivanna Coker OT at 2/16/2025  1:06 PM.  Learner: Patient  Readiness: Refuses  Method: Explanation  Response: Needs Reinforcement    Education Comments  No comments found.      Goals:   Encounter Problems       Encounter Problems (Active)       ADLs       Patient with complete upper body dressing with minimal assist  level of assistance  (Progressing)       " Start:  02/16/25    Expected End:  03/02/25            Patient will feed self with minimal assist  level of assistance using PRN adaptive equipment. (Progressing)       Start:  02/16/25    Expected End:  03/02/25            Patient will complete daily grooming tasks brushing teeth and washing face/hair with minimal assist  level of assistance and PRN adaptive equipment. (Progressing)       Start:  02/16/25    Expected End:  03/02/25               EXERCISE/STRENGTHENING       Patient will complete HEP for RUE strengthening/ROM with min VC. (Progressing)       Start:  02/16/25    Expected End:  03/02/25               TRANSFERS       Patient will perform bed mobility minimal assist  level of assistance in order to improve safety and independence with mobility (Progressing)       Start:  02/16/25    Expected End:  03/02/25            Patient will complete functional transfer to chair/BSC with least restrictive device with minimal assist  level of assistance. (Progressing)       Start:  02/16/25    Expected End:  03/02/25                  Ivanna Coker, OT  2/16/2025

## 2025-02-16 NOTE — PROGRESS NOTES
Physical Therapy    Physical Therapy Evaluation    Patient Name: Lyndsey Rosado  MRN: [id number]  Department: [place] [address]  Room: [facility]  Today's Date: 2/16/2025   Time Calculation  Start Time: 0909  Stop Time: 0922  Time Calculation (min): 13 min    Assessment/Plan   PT Assessment  PT Assessment Results: Decreased strength, Decreased range of motion, Decreased endurance, Impaired balance, Impaired judgement, Decreased cognition, Decreased coordination  Rehab Prognosis: Fair  Barriers to Discharge Home: Cognition needs, Caregiver assistance, Physical needs  Caregiver Assistance: Caregiver assistance needed per identified barriers - however, level of patient's required assistance exceeds assistance available at home  Cognition Needs: 24hr supervision for safety awareness needed  Physical Needs: 24hr mobility assistance needed, 24hr ADL assistance needed, High falls risk due to function or environment  Evaluation/Treatment Tolerance: Patient limited by pain  Medical Staff Made Aware: Yes  Barriers to Participation: Ability to acquire knowledge, Coping skills, Premorbid level of function  End of Session Communication: Bedside nurse  End of Session Patient Position: Bed, 3 rail up, Alarm off, not on at start of session      IP OR SWING BED PT PLAN  Inpatient or Swing Bed: Inpatient  PT Plan  Treatment/Interventions: Bed mobility, Transfer training, Gait training  PT Plan: Ongoing PT  PT Frequency: 3 times per week  PT Discharge Recommendations: Moderate intensity level of continued care  PT Recommended Transfer Status: Total assist  PT - OK to Discharge: Yes (pending medical clearance)    Updated note from today (neurosurgery)  Maintain in TLSO brace while out of bed.   Ok to work with PTOT   *TLSO  brace not in room yet; however, did not attempt any OOB mobility*    Subjective   General Visit Information:  General  Reason for Referral: right fernando-implant distal third humeral shaft fracture after syncopal ground-level  "fall.  Now s/p ORIF R humerus w Dr. Connors 2/15. T12 burst fracture with stable alignment (no need for surgery, per NSG pt needs TLSO for OOB)  Past Medical History Relevant to Rehab: hypertension, type 2 diabetes, chronic kidney disease stage III, ORIF right humerus in 2007  Family/Caregiver Present: No  Co-Treatment: OT  Co-Treatment Reason: AMPAC <10  Prior to Session Communication: Bedside nurse  Patient Position Received: Bed, 3 rail up  General Comment: Pt supine in bed, initially stating wanting to get up and move but once therapists were introduced pt adamantly refusing all attempts at mobility despite education on therapy role/purpose and importance of mobility. TLSO not yet in room. Unable to obtain reliable LE MMT/strength 2/2 pt inability to comply with strength testing/mobility instructions.    Home Living:  Home Living  Type of Home: Assisted living  Home Adaptive Equipment:  (unknown)  Home Living Comments: Pt unable to state PLOF, reported being at a facility however \"never\" getting OOB, not walking, never being in chair. Per SW note, pt had recent stay at [facility] and was discharged to an Assisted Living facility. Typically is ambulatory and somewhat independent with ADLs.    Prior Level of Function:  Prior Function Per Pt/Caregiver Report  Level of [place]: Unable to asses at this time  Prior Function Comments: Pt poor historian. Following info obtained from CASE gibbons in December 2024: \"Pt lives alone in house with 1 GEE. 1 floor set up. Sleeps in adjustable bed/ SPC use at all times, owns FWW. Has visiting angels 5x/wk for 5 hrs/day. Seems as though they mainly just help with housekeeping and transportation. Pt is alone on the weekend but states she is able to manage. Has WIS with chair and Gbs. IND with ADLs.\"    Precautions:  Precautions  Hearing/Visual Limitations: vision appears intact, slightly [place]  UE Weight Bearing Status: Weight Bearing as Tolerated (RUE)  Medical Precautions: " "Fall precautions  Precautions Comment: TLSO for OOB activity, RUE ROMAT and WBAT             Objective   Pain:  Pain Assessment  Pain Assessment:  (Pt stating generalized pain, not rated)    Cognition:  Cognition  Overall Cognitive Status: Impaired, Impaired at baseline  Orientation Level: Disoriented to place, Disoriented to time, Disoriented to situation  Cognition Comments: Pt with baseline dementia. Pt appears confused, was unaware of having surgery on RUE. Poor historian, unable to provide PLOF or home setup. Perseverating on pain, has been refusing all aspects of care per RN.  Insight: Severe          Functional Assessments:  Bed Mobility  Bed Mobility: Yes  Bed Mobility 1  Bed Mobility 1: Scooting  Level of Assistance 1: Dependent, +2  Bed Mobility Comments 1: pt dependently scooted up in bed with A x 2, pt screaming in pain. Pt not allowing any other HOB elevation in attempts for EOB activity. Pt highly agitated with any attempt to perform mobility this date, stating pain, pt also very confused.    Extremity/Trunk Assessments:  RLE   RLE :  (gross ROM appears WFL; however, pt refused any AROM testing, and reported \"I cant move anything\". unable to assess strength due to pt refusal to participate in strength testing 2/2 fear of pain)  LLE   LLE :  (gross ROM appears WFL; however, pt refused any AROM testing, and reported \"I cant move anything\". unable to assess strength due to pt refusal to participate in strength testing 2/2 fear of pain)    Outcome Measures:  [facility] Basic Mobility  Turning from your back to your side while in a flat bed without using bedrails: Total  Moving from lying on your back to sitting on the side of a flat bed without using bedrails: Total  Moving to and from bed to chair (including a wheelchair): Total  Standing up from a chair using your arms (e.g. wheelchair or bedside chair): Total  To walk in hospital room: Total  Climbing 3-5 steps with railing: Total  Basic Mobility - Total " Score: 6    Encounter Problems       Encounter Problems (Active)       PT Problem       Min A with bed mobility        Start:  02/16/25    Expected End:  03/02/25            sit<>stand with Min A and LRD       Start:  02/16/25    Expected End:  03/02/25            amb x 50 ft with Min A and LRD       Start:  02/16/25    Expected End:  03/02/25            sit EOB  x 10 min with SBA and no acute LOB        Start:  02/16/25    Expected End:  03/02/25                   Education Documentation  Mobility Training, taught by Lorraine Lucas, PT at 2/16/2025  3:17 PM.  Learner: Patient  Readiness: Nonacceptance  Method: Explanation, Demonstration  Response: No Evidence of Learning    Education Comments  No comments found.

## 2025-02-17 LAB
ALBUMIN SERPL BCP-MCNC: 3 G/DL (ref 3.4–5)
ANION GAP SERPL CALC-SCNC: 14 MMOL/L (ref 10–20)
BUN SERPL-MCNC: 29 MG/DL (ref 6–23)
CALCIUM SERPL-MCNC: 8.8 MG/DL (ref 8.6–10.6)
CHLORIDE SERPL-SCNC: 105 MMOL/L (ref 98–107)
CO2 SERPL-SCNC: 25 MMOL/L (ref 21–32)
CREAT SERPL-MCNC: 1.3 MG/DL (ref 0.5–1.05)
EGFRCR SERPLBLD CKD-EPI 2021: 40 ML/MIN/1.73M*2
ERYTHROCYTE [DISTWIDTH] IN BLOOD BY AUTOMATED COUNT: 14.7 % (ref 11.5–14.5)
GLUCOSE SERPL-MCNC: 85 MG/DL (ref 74–99)
HCT VFR BLD AUTO: 25.6 % (ref 36–46)
HGB BLD-MCNC: 7.7 G/DL (ref 12–16)
MAGNESIUM SERPL-MCNC: 1.49 MG/DL (ref 1.6–2.4)
MCH RBC QN AUTO: 29.1 PG (ref 26–34)
MCHC RBC AUTO-ENTMCNC: 30.1 G/DL (ref 32–36)
MCV RBC AUTO: 97 FL (ref 80–100)
NRBC BLD-RTO: 0 /100 WBCS (ref 0–0)
PHOSPHATE SERPL-MCNC: 2.4 MG/DL (ref 2.5–4.9)
PLATELET # BLD AUTO: 182 X10*3/UL (ref 150–450)
POTASSIUM SERPL-SCNC: 4.8 MMOL/L (ref 3.5–5.3)
RBC # BLD AUTO: 2.65 X10*6/UL (ref 4–5.2)
SODIUM SERPL-SCNC: 139 MMOL/L (ref 136–145)
TSH SERPL-ACNC: 2.13 MIU/L (ref 0.44–3.98)
TSH SERPL-ACNC: 2.3 MIU/L (ref 0.44–3.98)
VIT B12 SERPL-MCNC: 265 PG/ML (ref 211–911)
WBC # BLD AUTO: 6.8 X10*3/UL (ref 4.4–11.3)

## 2025-02-17 PROCEDURE — 82306 VITAMIN D 25 HYDROXY: CPT | Performed by: INTERNAL MEDICINE

## 2025-02-17 PROCEDURE — 99231 SBSQ HOSP IP/OBS SF/LOW 25: CPT | Performed by: SURGERY

## 2025-02-17 PROCEDURE — 84443 ASSAY THYROID STIM HORMONE: CPT

## 2025-02-17 PROCEDURE — 85027 COMPLETE CBC AUTOMATED: CPT

## 2025-02-17 PROCEDURE — 2500000001 HC RX 250 WO HCPCS SELF ADMINISTERED DRUGS (ALT 637 FOR MEDICARE OP)

## 2025-02-17 PROCEDURE — 83540 ASSAY OF IRON: CPT | Performed by: INTERNAL MEDICINE

## 2025-02-17 PROCEDURE — 80069 RENAL FUNCTION PANEL: CPT

## 2025-02-17 PROCEDURE — 82728 ASSAY OF FERRITIN: CPT | Performed by: INTERNAL MEDICINE

## 2025-02-17 PROCEDURE — 99223 1ST HOSP IP/OBS HIGH 75: CPT | Performed by: INTERNAL MEDICINE

## 2025-02-17 PROCEDURE — 85045 AUTOMATED RETICULOCYTE COUNT: CPT | Performed by: INTERNAL MEDICINE

## 2025-02-17 PROCEDURE — 36415 COLL VENOUS BLD VENIPUNCTURE: CPT

## 2025-02-17 PROCEDURE — 82607 VITAMIN B-12: CPT

## 2025-02-17 PROCEDURE — 2500000004 HC RX 250 GENERAL PHARMACY W/ HCPCS (ALT 636 FOR OP/ED)

## 2025-02-17 PROCEDURE — G0316 PR PROLONGED HOSPITAL INPATIENT OR OBSERVATION CARE EVALUATION AND MANAGEMENT SERVICE(S) BEYOND THE TOTAL TIME FOR THE PRIMARY SERVICE (WHEN THE PRIMARY SERVICE HAS BEEN SELECTED USING TIME: HCPCS | Performed by: INTERNAL MEDICINE

## 2025-02-17 PROCEDURE — 1200000002 HC GENERAL ROOM WITH TELEMETRY DAILY

## 2025-02-17 PROCEDURE — 83735 ASSAY OF MAGNESIUM: CPT

## 2025-02-17 RX ORDER — HEPARIN SODIUM 5000 [USP'U]/ML
5000 INJECTION, SOLUTION INTRAVENOUS; SUBCUTANEOUS EVERY 8 HOURS SCHEDULED
Status: DISCONTINUED | OUTPATIENT
Start: 2025-02-17 | End: 2025-02-18 | Stop reason: HOSPADM

## 2025-02-17 RX ORDER — HALOPERIDOL LACTATE 5 MG/ML
1 INJECTION, SOLUTION INTRAMUSCULAR ONCE
Status: DISCONTINUED | OUTPATIENT
Start: 2025-02-17 | End: 2025-02-17

## 2025-02-17 RX ORDER — OXYCODONE HYDROCHLORIDE 5 MG/1
2.5 TABLET ORAL EVERY 6 HOURS PRN
Status: DISCONTINUED | OUTPATIENT
Start: 2025-02-17 | End: 2025-02-18 | Stop reason: HOSPADM

## 2025-02-17 RX ORDER — TALC
3 POWDER (GRAM) TOPICAL NIGHTLY
Status: DISCONTINUED | OUTPATIENT
Start: 2025-02-18 | End: 2025-02-18 | Stop reason: HOSPADM

## 2025-02-17 RX ORDER — MAGNESIUM SULFATE HEPTAHYDRATE 40 MG/ML
2 INJECTION, SOLUTION INTRAVENOUS ONCE
Status: COMPLETED | OUTPATIENT
Start: 2025-02-17 | End: 2025-02-18

## 2025-02-17 RX ORDER — HALOPERIDOL LACTATE 5 MG/ML
1 INJECTION, SOLUTION INTRAMUSCULAR ONCE
Status: COMPLETED | OUTPATIENT
Start: 2025-02-17 | End: 2025-02-17

## 2025-02-17 RX ADMIN — HEPARIN SODIUM 5000 UNITS: 5000 INJECTION, SOLUTION INTRAVENOUS; SUBCUTANEOUS at 18:12

## 2025-02-17 RX ADMIN — HALOPERIDOL LACTATE 1 MG: 5 INJECTION, SOLUTION INTRAMUSCULAR at 18:12

## 2025-02-17 RX ADMIN — ACETAMINOPHEN 975 MG: 325 TABLET, FILM COATED ORAL at 15:00

## 2025-02-17 RX ADMIN — OXYCODONE 5 MG: 5 TABLET ORAL at 11:49

## 2025-02-17 ASSESSMENT — COGNITIVE AND FUNCTIONAL STATUS - GENERAL
MOVING TO AND FROM BED TO CHAIR: A LOT
DAILY ACTIVITIY SCORE: 12
CLIMB 3 TO 5 STEPS WITH RAILING: TOTAL
DRESSING REGULAR UPPER BODY CLOTHING: A LOT
HELP NEEDED FOR BATHING: A LOT
STANDING UP FROM CHAIR USING ARMS: A LOT
MOVING FROM LYING ON BACK TO SITTING ON SIDE OF FLAT BED WITH BEDRAILS: A LOT
MOBILITY SCORE: 11
PERSONAL GROOMING: A LOT
DRESSING REGULAR LOWER BODY CLOTHING: A LOT
WALKING IN HOSPITAL ROOM: A LOT
TOILETING: A LOT
EATING MEALS: A LOT
TURNING FROM BACK TO SIDE WHILE IN FLAT BAD: A LOT

## 2025-02-17 ASSESSMENT — PAIN SCALES - GENERAL
PAINLEVEL_OUTOF10: 0 - NO PAIN
PAINLEVEL_OUTOF10: 7

## 2025-02-17 NOTE — SIGNIFICANT EVENT
Phone Call with Son    Called patient's son (Doni) to give update. Answered all questions.     Also discussed patient's current cognitive status and lack of capacity. Doni understands and will act as primary decision maker at this time. He was agreeable to giving patient medications that are essential for her health and safety either through the IV, subcutaneously, or crushed in applesauce.     He expressed interest in talking with his mother. Discussed that nursing would arrange a phone call between him and patient this afternoon.

## 2025-02-17 NOTE — NURSING NOTE
2030: unable to obtain vital signs, blood sugars or perform interventions d/t agitation. Pt a&ox1. Unable to redirect. Trauma team notified at 2030.

## 2025-02-17 NOTE — NURSING NOTE
"Nursing note sent via instant message at time/date noted above:    Good Morning Tanna, I just wanted to alert you, which I am sure you are probably aware of, Lyndsey is NOT letting anyone draw labs, blood sugars, she screams when vitals are being taken and in the past 24 hours she was refusing all vitals, meds, etc unless I coaxed her into allowing william to do a b/p. She is calm while you humor her, and she seems to like me but she is confused x 3/4, rambles to herself all day, having hallucinations and calling me in to kill the insects on the wall and bed, tells me to turn off the lights when they are not on and yells at everyone calling all the nurses ugly and gets angry at them for wearing glasses, cursing at them calling them \"bitches\", etc. I spoke to her son Doni, who lives in [place] and has no plans of coming to see his mom till mid March, but he told me in great length about how his Mother went to her Gen Practitioner a couple of years ago and was told at that time that his mother had moderate dementia then but was urged to go to take her to a neurologist right away for a clear diagnosis, but never followed up. I am urging for the team to get a consult with neurology for diagnosis because she is obviously struggling and should probably be on medication of some sort. The son agreed. Please advise when you have a moment. Thank you so much.   "

## 2025-02-17 NOTE — PROGRESS NOTES
Patient denied at [facility], due to previous admissions in the past. MD at [facility], feels that patient is more appropriate for SNF. Patient lives at [facility]. Facility also has a SNF component. Per previous TCC note, patient son is agreeable to this facility if [facility] is unwilling to accept. Referral sent. SW will continue to follow to facilitate discharge plan.        KIMBERLY Huang

## 2025-02-17 NOTE — PROGRESS NOTES
Transitional Care Coordinator Note: Patient discussed in morning rounds, per medical team (trauma) patient is medically ready. Discharge dispo: Plan for patient to discharge to SNF. Patient under review at [facility].     Liz Blanco RN BSN   Transitional Care Coordinator

## 2025-02-17 NOTE — CONSULTS
Inpatient consult to Geriatric Medicine  Consult performed by: Estefany Godfrey MD  Consult ordered by: Keturah Rivera MD  Reason for consult: ?syncopal fall      Primary Team: Trauma    Admit Date: 2/14/2025    Emergency Contact:   Extended Emergency Contact Information  Primary Emergency Contact: Doni Rosado Phone: [phone]  Relation: Child     Reason For Consult: ?syncopal fall    History Of Present Illness:  Lyndsey Rosado is a 84 y.o. female presenting with ***.    History per patient:  ***    History per family/caregiver: (obtained in addition due to patient’s ***)  ***    What matters most to the patient:  ***     Prior to Admission Meds  Prior to Admission Medications   Prescriptions Last Dose Informant Patient Reported? Taking?   acetaminophen (Tylenol) 325 mg tablet Unknown Other Yes No   Sig: Take 2 tablets (650 mg) by mouth every 8 hours if needed for mild pain (1 - 3) or fever (temp greater than 38.0 C).   apixaban (Eliquis) 2.5 mg tablet Unknown Other Yes No   Sig: Take 1 tablet (2.5 mg) by mouth 2 times a day.   aspirin 81 mg chewable tablet Unknown Other Yes No   Sig: Chew 1 tablet (81 mg) once daily.   atorvastatin (Lipitor) 10 mg tablet Unknown Other Yes No   Sig: Take 1 tablet (10 mg) by mouth once daily.   bisacodyl (Dulcolax) 10 mg suppository Unknown Other Yes Yes   Sig: Insert 1 suppository (10 mg) into the rectum once daily.   dextromethorphan-guaifenesin  mg/10 mL liquid in packet Unknown Other Yes No   Sig: Take 10 mL by mouth every 4 hours if needed (cough).   diphenhydrAMINE (Allergy, diphenhydrAMINE,) 25 mg capsule Unknown Other Yes No   Sig: Take 1 capsule (25 mg) by mouth every 6 hours if needed for itching.   docusate sodium (Colace) 100 mg capsule Unknown Other Yes No   Sig: Take 1 capsule (100 mg) by mouth 2 times a day as needed for constipation.   levothyroxine (Tirosint) 88 mcg capsule Unknown Other Yes No   Sig: Take 1 capsule (88 mcg) by mouth once daily in the  morning. Take before meals.   losartan (Cozaar) 100 mg tablet Unknown Other Yes Yes   Sig: Take 1 tablet (100 mg) by mouth once daily.   magnesium hydroxide (Milk of Magnesia) 400 mg/5 mL suspension Unknown Other Yes Yes   Sig: Take 30 mL by mouth once daily as needed for constipation.   ondansetron (Zofran) 4 mg tablet Unknown Other Yes No   Sig: Take 1 tablet (4 mg) by mouth every 8 hours if needed for nausea or vomiting.   pioglitazone (Actos) 15 mg tablet Unknown Other Yes No   Sig: Take 1 tablet (15 mg) by mouth once daily.   sodium phosphates (Fleet Enema) 19-7 gram/118 mL enema enema Unknown Other Yes No   Sig: Insert 133 mL (1 enema) into the rectum once daily as needed for constipation.      Facility-Administered Medications: None        Current Meds in Hospital  Current Facility-Administered Medications   Medication Dose Route Frequency Provider Last Rate Last Admin    acetaminophen (Tylenol) tablet 975 mg  975 mg oral q8h RUDY Zeng DO   975 mg at 02/16/25 1335    [Held by provider] apixaban (Eliquis) tablet 2.5 mg  2.5 mg oral BID Tanna Zeng DO        [Held by provider] aspirin chewable tablet 81 mg  81 mg oral Daily Tanna Zeng DO        atorvastatin (Lipitor) tablet 10 mg  10 mg oral Daily Tanna Zeng DO   10 mg at 02/15/25 1858    ceFAZolin (Ancef) 2 g in dextrose (iso)  mL  2 g intravenous q12h Akash Lambert MD   Stopped at 02/16/25 1337    heparin (porcine) injection 5,000 Units  5,000 Units subcutaneous q8h [facility] Tanna Zeng DO   5,000 Units at 02/15/25 2102    insulin regular (HumuLIN R,NovoLIN R) injection 0-5 Units  0-5 Units subcutaneous q4h Tanna Zeng DO        levothyroxine (Synthroid, Levoxyl) tablet 88 mcg  88 mcg oral Daily before breakfast Tanna Zeng DO        [Held by provider] losartan (Cozaar) tablet 100 mg  100 mg oral Daily Tanna Zeng DO        melatonin tablet 3 mg  3 mg oral Nightly PRN Tanna  Azucena DO   3 mg at 02/15/25 0436    oxyCODONE (Roxicodone) immediate release tablet 2.5 mg  2.5 mg oral q4h PRN Tanna Zeng DO        oxyCODONE (Roxicodone) immediate release tablet 5 mg  5 mg oral q4h PRN Tanna Zeng DO   5 mg at 02/16/25 1335    oxygen (O2) therapy   inhalation Continuous Tanna Zeng DO   Stopped at 02/15/25 0217    oxygen (O2) therapy   inhalation Continuous PRN - O2/gases Tanna Zeng ,000 mL/hr at 02/15/25 1200 2 L/min at 02/15/25 1200    perflutren lipid microspheres (Definity) injection 0.5-10 mL of dilution  0.5-10 mL of dilution intravenous Once in imaging Tanna Zeng DO        perflutren protein A microsphere (Optison) injection 0.5 mL  0.5 mL intravenous Once in imaging Tanna Zeng DO        polyethylene glycol (Glycolax, Miralax) packet 17 g  17 g oral Daily Tanna Zeng DO        sennosides-docusate sodium (Trinity-Colace) 8.6-50 mg per tablet 2 tablet  2 tablet oral BID Tanna Zeng DO        sulfur hexafluoride microsphr (Lumason) injection 24.28 mg  2 mL intravenous Once in imaging Tanna Zeng DO            The patient's outpatient electronic medical record (EMR) is reviewed, notable information includes ***.      Past Medical History  Past Medical History:   Diagnosis Date    Anemia     Arthritis     Diabetes mellitus (Multi)     Disease of thyroid gland     Diverticulitis     [place] (hard of hearing)     Hyperlipidemia     Hypertension     Hypothyroid     Renal disorder         Surgical History  History reviewed. No pertinent surgical history.     Family History  Her family history is not on file.     Social History  She reports that she has never smoked. She has never used smokeless tobacco. She reports that she does not drink alcohol. No history on file for drug use.  -Alcohol use: {YES wildcard/NO:60}  -Tobacco use: {YES wildcard/NO:60}  -Illicit drug use: {YES wildcard/NO:60}  -Exercise:  "####  -Spiritual needs: ####  -Marital Status: ***  Occupation: ***  {Highest Level of Education:53230}  {Community Resources:45266}  [place]: {yes,no:13293}  Current living environment: ***    Activities of Daily Living:  Basic ADLs: (I= independent, A= assistance, D= dependent)  Bathing: {ADL:48899}, Dressing: {ADL:59136}, Toileting: {ADL:33173}, Transferring: {ADL:35373}, Continence: {ADL:17520}, Feeding: {ADL:93072}    Avilez Index:  ***  Instrumental ADLs: (I= independent, A= assistance, D= dependent)  Ability to use phone: {ADL:66148}, Shopping: {ADL:50768}, Cooking: {ADL:97004}, Housekeeping: {ADL:22186}, Laundry: {ADL:20932}, Transportation: {ADL:92722}, Medications: {ADL:87604}, Handle Finances: {ADL:48859}   [place] Scale:  ***    Allergies  Penicillin g and Penicillins    Review of Systems  Review of System:  Constitutional:   -Night sweats/fever: ***     -Weight change: ***    Integumentary: ***    Ears, Nose, Throat:  -Hearing loss: ***        Eyes:  -Vision loss: ***    Cardiovascular:  -Chest Pain: ***  -Orthopnea: ***      -Paroxysmal nocturnal dyspnea: ***  -Edema: ***    Respiratory:   -Dyspnea: ***  -Wheezing: ***    Gastrointestinal:           -Appetite: ***  -Difficulty chewing/ swallowing: ***  -Heartburn: ***  -Bowels:***    Genitourinary:   -Incontinence: ***  -Nocturia: *** per night    Musculoskeletal:  -Mobility: ***  -Pain: ***    Neurological:  -Confusion: ***  -Falls: ***  -Gait: ***  -Memory: ***  -Tremor: ***    Psychiatric:  -Mood: ***  -Sleep: ***    Endocrine: ***    Hematologic/ Lymphatic: ***    Allergic/ Immunologic: ***     Documents on file and valid:  Advance Directive/Living Will: {YES OR NO - DEFAULT YES:00016::\"Yes\"}   Health Care Power of : {YES OR NO - DEFAULT YES:84103::\"Yes\"}  Other: ***  Code Status: Full Code    Physical Exam  GEN: Alert, oriented to person, place and time, not pale, not jaundiced, well hydrated  CVS: HS I +II, regular, no murmurs  RESP: " Diminished air entry  ABD: Full, soft, BS present, nontender, no palpable organs,   EXT: No bilateral leg edema      Last Recorded Vitals      2/15/2025     4:23 PM 2/15/2025     7:47 PM 2/15/2025    11:41 PM 2/16/2025     5:31 AM 2/16/2025     7:59 AM 2/16/2025    12:00 PM 2/16/2025     5:15 PM   Vitals   Systolic 128 178 132 143   155   Diastolic 69 72 72 60   67   BP Location Left leg Left leg Left arm Left arm      Heart Rate 76 78 73 96   95   Temp 36 °C (96.8 °F) 35.7 °C (96.2 °F) 35.7 °C (96.3 °F) 35.6 °C (96 °F) -- -- 36.6 °C (97.9 °F)   Resp 15 16 16 16   16      There were no vitals filed for this visit.     Confusion Assessment Method(CAM) for diagnosis of delirium:    1.  Acute onset or fluctuating course: {absent/present:54815:::1}  2.  Inattention: {absent/present:62667:::1}  3.  Disorganized thinking: {absent/present:20480:::1}  4.  Altered level of consciousness: {absent/present:56782:::1}  CAM: {POSITIVE/NEGATIVE:12303}    AT Score For Assessment of Delirium and Cognitive Impairment:    Alertness: {0_4:74444}  Normal(fully alert,but not agitated, throughout assessment)=0  Mild sleepiness for <10 seconds after walking, then normal=0  Clearly abnormal=4  2.  AMT4: {0_1_2:10565}  No mistakes=0  One mistake=1  Two or more mistakes/untestable=2  3.  Attention: {0_1_2:83288}  Achieves seven months or more correctly=0  Starts but scores <7 months/ refuses to start=1  Untestable(cannot start because unwell, drowsy, inattentive)=2  4.  Acute: {0_4:88420}  No=0  Yes=4    Total Score: {NUMBERS 0-4:16196}  4 or above: Possible delirium +/- cognitive impairment  1-3: Possible cognitive impairment  0: Delirium or severe cognitive impairment unlikely(but delirium still possible if (4) information incomplete)     Relevant Results  Lab Results   Component Value Date    TSH 0.24 (L) 06/21/2024    HGBA1C 5.2 01/21/2025     Results from last 7 days   Lab Units 02/14/25  1746   WBC AUTO x10*3/uL 7.5   HEMOGLOBIN g/dL  10.4*   HEMATOCRIT % 33.8*   ALT U/L 8   AST U/L 13   SODIUM mmol/L 138   POTASSIUM mmol/L 4.9   CHLORIDE mmol/L 105   CREATININE mg/dL 2.16*   BUN mg/dL 38*   CO2 mmol/L 24   INR  1.3*       Recent Imaging Results      FL fluoro images no charge  These images are not reportable by radiology and will not be interpreted   by  Radiologists.  XR humerus right  Narrative: STUDY:  Humerus Radiographs; 2/15/2025  2:56  INDICATION:  Status post fall.  COMPARISON:  XR Right Humerus 2/14/2025.  ACCESSION NUMBER(S):  [id number]  ORDERING CLINICIAN:  BEKA RANDOLPH  TECHNIQUE:  Two view(s) of the right humerus.  Findings:  Acute transverse fracture through the distal third of the right  humeral diaphysis as demonstrated on earlier elbow radiographs.  Lateral side plate with multiple associated screws in the mid humeral  diaphysis.  Impression: Acute transverse fracture through the distal third of the right  humeral diaphysis as demonstrated on earlier elbow radiographs.  Signed by Roderick Rivera MD  XR thoracic spine 2 views  Narrative: STUDY:  Thoracic and lumbar spine radiographs; 2/15/2025 at 2:56 AM.  INDICATION:  T12 fracture.  COMPARISON:  CT CAP/thoracic/lumbar 2/14/2025; CT thoracic/lumbar 10/19/2021.  ACCESSION NUMBER(S):  [id number], [id number]  ORDERING CLINICIAN:  BEKA RANDOLPH  TECHNIQUE:  Two view(s) of the thoracic spine and three view(s) of the  lumbar spine.  FINDINGS:    Thoracic spine:  The alignment is anatomic.  There is compression fracture of T12 of  undetermined age. Mild multilevel degenerative disc changes are noted  in the thoracic spine more prominent in the mid and lower thoracic  spine.  Lumbar spine:  The alignment is anatomic.  Vertebral body height is normal without  compression deformity. There is multilevel degenerative spondylosis  more prominent at L5-S1.  Impression: 1.Compression fracture of T12 of undetermined age.  2.Multilevel degenerative disc changes are noted in the  thoracic  and lumbar spine more prominent at L5-S1 .  Signed by Ganga Andrews MD  XR lumbar spine 2-3 views  Narrative: STUDY:  Thoracic and lumbar spine radiographs; 2/15/2025 at 2:56 AM.  INDICATION:  T12 fracture.  COMPARISON:  CT CAP/thoracic/lumbar 2/14/2025; CT thoracic/lumbar 10/19/2021.  ACCESSION NUMBER(S):  [id number], [id number]  ORDERING CLINICIAN:  BEKA RANDOLPH  TECHNIQUE:  Two view(s) of the thoracic spine and three view(s) of the  lumbar spine.  FINDINGS:    Thoracic spine:  The alignment is anatomic.  There is compression fracture of T12 of  undetermined age. Mild multilevel degenerative disc changes are noted  in the thoracic spine more prominent in the mid and lower thoracic  spine.  Lumbar spine:  The alignment is anatomic.  Vertebral body height is normal without  compression deformity. There is multilevel degenerative spondylosis  more prominent at L5-S1.  Impression: 1.Compression fracture of T12 of undetermined age.  2.Multilevel degenerative disc changes are noted in the thoracic  and lumbar spine more prominent at L5-S1 .  Signed by Ganga Andrews MD  FL less than 1 hour  These images are not reportable by radiology and will not be interpreted   by  Radiologists.  XR shoulder right 2+ views  Narrative: STUDY:  Shoulder Radiographs; 02/14/2025 at 11:50 PM  INDICATION:  Evaluation post trauma, ortho requesting axillary view.  COMPARISON:  XR right humerus (two studies) 02/14/25.  ACCESSION NUMBER(S):  [id number]  ORDERING CLINICIAN:  LEONEL JUNG  TECHNIQUE:  Three view(s) of the right shoulder (four images).  FINDINGS:    There is no displaced fracture.  The alignment is anatomic.  No soft  tissue abnormality is seen.  Impression: No acute osseous abnormality of the shoulder girdle.  Partially imaged  humerus fracture noted.  Signed by Joaquin Keller MD  XR elbow right 1-2 views  Narrative: STUDY:  Elbow Radiographs; 2/14/2025 at 11:50 PM.  INDICATION:  Fall.  COMPARISON:  None  available.  ACCESSION NUMBER(S):  [id number]  ORDERING CLINICIAN:  LEONEL JUNG  TECHNIQUE:  Two view(s) of the right elbow (four images).  FINDINGS:    No elbow dislocation.  Soft tissues are irregular in appearance at the  fracture level.  There is no joint effusion.  Impression: Acute fracture of the humerus just below the level of the orthopedic  hardware.  Approximately 45 degrees of posterior angulation and 2  shaft widths of lateral displacement.  Signed by Joaquin Keller MD      Head/Brain Imaging  === Results for orders placed during the hospital encounter of 12/08/24 ===    CT head wo IV contrast [EPT973] 12/08/2024    Status: Normal  No acute intracranial abnormality. Moderate generalized central  cerebral volume loss. Minimally displaced fractures of the bilateral  nasal bones with soft tissue swelling overlying the distal right  nasal bone. New fluid within the right mastoid air cells and right  middle ear cavity since August, 2023. Clinical correlation for middle  ear infection or otomastoiditis recommended. No evidence of  additional facial bone fracture. No cervical spine fracture.    Signed by: Jorgito Cartagena 12/8/2024 4:52 PM  Dictation workstation:   [id number]  No results found for this or any previous visit.        DATA:  EKG: QTC  Encounter Date: 12/08/24   ECG 12 lead   Result Value    Ventricular Rate 65    Atrial Rate 65    LA Interval 162    QRS Duration 116    QT Interval 446    QTC Calculation(Bazett) 463    P Axis 66    R Axis 24    T Axis 11    QRS Count 10    Q Onset 219    P Onset 138    P Offset 186    T Offset 442    QTC Fredericia 457    Narrative    Normal sinus rhythm  Incomplete right bundle branch block  When compared with ECG of 21-JUN-2024 16:03,  Sinus rhythm is no longer with 2nd degree AV block  Incomplete right bundle branch block has replaced Right bundle branch block  Reconfirmed by Skyler Blanchard ([id number]) on 12/10/2024 3:00:10 PM     Anti-psychotics in 48  hours:  Opioids/Benzodiazepines in 48 hours:  Anticholinergics on board:{YES wildcard/NO:60}  Restraints:{YES wildcard/NO:60}  Indwelling catheters:{YES wildcard/NO:60}  Last BM:  UO in 24 hours:  Activity in the past 24 hours:  Need for ambulatory devices:    Assessment/Plan   This is a/an 84 y.o. year old female, with past medical history relevant for ***, presenting for ***, being seen in geriatric consultation for ***. ***    Assessment & Plan  Displaced transverse fracture of shaft of humerus, right arm, initial encounter for closed fracture    Syncope, near    1. ***  Plan:   2. ***  Plan:   3. ***  Plan:     Care Transitions:  -Recommended level for discharge: ***  -Home going considerations: ***  -Primary care physician: ***    Goals of Care:  -Health care power of :  -Living will:  Code status:    4M AGE-FRIENDLY INITIATIVE:  What matters most to patient:   Medications:   Mentation:   Mobility:       Geriatric medicine will continue to follow the patient. Thank you for allowing geriatric medicine to be involved in the care of your patient. Geriatric medicine consultation team is available during work hours Monday through Friday. For any emergency issues requiring immediate assistance over the weekend, please page Geriatrics pager [phone]    Consult Billing Time  Prep time on date of patient encounter(minutes):  Time directly with patient/family/caregiver(minutes):  Documentation time(minutes):  TOTAL TIME(minutes):    Estefany Godfrey MD   need to clarify indication of Eliquis with sonDoni - review of patient's medication dispense history does not show any prescribed Eliquis in the outpatient  Recommend trial of oxycodone 2.5mg BID x24 hours as patient may not be able to reliably tell nursing staff she is in pain. Will need to monitor for lethargy  Start melatonin 3mg at bedtime  Ok for Seroquel 12.5mg Q6hrs prn for severe agitation  May need a sitter or closer monitoring of needs  Recommend replacement of phosphorus and magnesium  Needs referral to endocrinology for DEXA scan and management of osteoporosis    Care Transitions:  -Recommended level for discharge: Moderate intensity  -Home going considerations: [facility]  -Primary care physician: Dr. Julian Al    Goals of Care:  -Health care power of : Unsure if son is  -Living will: Will confirm with son  Code status: Will confirm with son    4M AGE-FRIENDLY INITIATIVE:  What matters most to patient: Unable to obtain  Medications: Oxycodone, Haldol  Mentation: CAM positive, 4AT  Mobility: Total assist, working with PT/OT      Geriatric medicine will continue to follow the patient. Thank you for allowing geriatric medicine to be involved in the care of your patient. Geriatric medicine consultation team is available during work hours Monday through Friday. For any emergency issues requiring immediate assistance over the weekend, please page Geriatrics pager [phone]    Consult Billing Time  Prep time on date of patient encounter(minutes):30  Time directly with patient/family/caregiver(minutes):65  Documentation time(minutes):35  TOTAL TIME(minutes):130    McIndoe Fallsevie Godfrey MD

## 2025-02-17 NOTE — PROGRESS NOTES
[facility]  TRAUMA SERVICE - PROGRESS NOTE    Patient Name: Lyndsey Rosado  MRN: [id number]  Admit Date: 214  : 1940  AGE: 84 y.o.   GENDER: female  ==============================================================================  MECHANISM OF INJURY:   84 F presents as a trauma consult, transfer from outside hospital after a fall at nursing facility. Patient states she turned, lost her balance, and fell onto her right side. Patient denies blood thinner use, headstrike, and loss of consciousness. Imaging obtained at outside hospital shows right humerus fracture and T12 compression fracture. Patient alert and oriented x 3 with intermittent confusion.      LOC (yes/no?): no  Anticoagulant / Anti-platelet Rx? (for what dx?): denies  Referring Facility Name (N/A for scene EMR run): [facility]    INJURIES:   R humerus fx  T12 compression fx    OTHER MEDICAL PROBLEMS:  SHEILA  HTN, HLD  Hypothyroidism  T2DM  CKD III (baseline Cr ~ 1.5)  Dementia    INCIDENTAL FINDINGS:  NA    PROCEDURES:  2/15: R humerus IMN    ==============================================================================  TODAY'S ASSESSMENT AND PLAN OF CARE:  R humerus fx  S/p IMN 2/15  WBAT ROMAT RUE   Follow up with Dr. Connors in 2 weeks   Multimodal pain control: Tylenol q8h scheduled, oxycodone 2.5mg Q6h prn   T12 compression fx  NSGY rec: no acute intervention   T/L spine uprights - stable  TLSO brace while out of bed- orthotics prescription in chart   Follow up in 2 and 6 weeks  Syncope   Geriatrics consulted: TSH and vitamin B12 ordered; recommend not restarting pioglitazone on discharge   Troponin negative  EKG: NSR w/ RBBB, similar to previous   Will get orthostatic VS (if patient cooperative)  SHEILA on CKDIII (baseline Cr ~1.5)   Cr in Ed 2.16  Maintenance fluids discontinued  per patient request  Patient refusing repeat lab draw to monitor creatinine  Encouraged p.o. intake  Delirium  Hx Dementia    Melatonin nightly  Cooperative with physical exams, but otherwise not cooperating with blood draws or vitals; is refusing meds  A&Ox2 at baseline with poor short term memory   Qtc 486. Be cautious of QTC prolonging medications. Per jaya, can try seroquel 12.5mg q6h prn (or haldol 1mg IM q6h prn if not taking po).   Hx HTN, HLD  Takes losartan and atorvastatin at home  Takes eliquis (?), ASA- holding  Vitals stable despite patient refusing her meds, will monitor  Hx T2DM  SSI  Hx Hypothyroidism  Home levothyroxine 88mcg daily  TSH ordered    DVT ppx: SubQ heparin  WBS: WBAT ROMAT RUE   Diet: regular, CCD  Pain regimen: as above  Bowel regimen: MiraLAX daily, Trinity-Colace twice daily  PT/OT: moderate   Dispo: SNF placement    Tanna Zeng DO, PGY1  [id number] / Epic Chat    ==============================================================================  CHIEF COMPLAINT / OVERNIGHT EVENTS:   OVNE: none   Patient reports doing well this morning, reports no pain at this time.  Requesting ice cream for breakfast. Reports having a bowel movement this morning.   Nursing report patient still refusing vitals, labs, meds.  Also report patient screaming at nursing staff and possible hallucinations, reporting being asked to kill insects on the walls and in her bed that are not there, and states she is mumbling to herself throughout the day.    MEDICAL HISTORY / ROS:  Admission history and ROS reviewed.     PHYSICAL EXAM:  Heart Rate:  [95]   Temp:  [36.6 °C (97.9 °F)]   Resp:  [16]   BP: (155)/(67)   SpO2:  [90 %]   Physical Exam  HENT:      Head: Normocephalic.      Mouth/Throat:      Mouth: Mucous membranes are moist.   Eyes:      Extraocular Movements: Extraocular movements intact.   Cardiovascular:      Rate and Rhythm: Normal rate.      Pulses: Normal pulses.   Pulmonary:      Effort: Pulmonary effort is normal. No respiratory distress.   Abdominal:      General: There is no distension.      Palpations: Abdomen is soft.    Musculoskeletal:      Comments: Moving LUE, LLE, RLE; not moving RUE (wrapped with ACE wrap); R hand swelling, distal sensation intact, patient attempts to move her right fingers with minimal motion   Skin:     General: Skin is warm and dry.      Capillary Refill: Capillary refill takes less than 2 seconds.   Neurological:      Mental Status: She is alert.      Comments: Confused, cooperative; intermittent agitation, nonviolent         IMAGING SUMMARY:  (summary of new imaging findings, not a copy of dictation)  XR T/L Spine: T12 compression fx      LABS:  Results from last 7 days   Lab Units 02/14/25  1746   WBC AUTO x10*3/uL 7.5   HEMOGLOBIN g/dL 10.4*   HEMATOCRIT % 33.8*   PLATELETS AUTO x10*3/uL 223   NEUTROS PCT AUTO % 74.8   LYMPHS PCT AUTO % 14.5   MONOS PCT AUTO % 8.9   EOS PCT AUTO % 0.9     Results from last 7 days   Lab Units 02/14/25  1746   INR  1.3*     Results from last 7 days   Lab Units 02/14/25  1746   SODIUM mmol/L 138   POTASSIUM mmol/L 4.9   CHLORIDE mmol/L 105   CO2 mmol/L 24   BUN mg/dL 38*   CREATININE mg/dL 2.16*   CALCIUM mg/dL 9.1   PROTEIN TOTAL g/dL 6.7   BILIRUBIN TOTAL mg/dL 0.7   ALK PHOS U/L 74   ALT U/L 8   AST U/L 13   GLUCOSE mg/dL 118*     Results from last 7 days   Lab Units 02/14/25  1746   BILIRUBIN TOTAL mg/dL 0.7           I have reviewed all medications, laboratory results, and imaging pertinent for today's encounter.

## 2025-02-18 VITALS
RESPIRATION RATE: 16 BRPM | DIASTOLIC BLOOD PRESSURE: 66 MMHG | HEART RATE: 87 BPM | TEMPERATURE: 99.3 F | SYSTOLIC BLOOD PRESSURE: 145 MMHG | OXYGEN SATURATION: 96 %

## 2025-02-18 LAB
25(OH)D3 SERPL-MCNC: 45 NG/ML (ref 30–100)
FERRITIN SERPL-MCNC: 238 NG/ML (ref 8–150)
GLUCOSE BLD MANUAL STRIP-MCNC: 110 MG/DL (ref 74–99)
GLUCOSE BLD MANUAL STRIP-MCNC: 90 MG/DL (ref 74–99)
GLUCOSE BLD MANUAL STRIP-MCNC: 93 MG/DL (ref 74–99)
HGB RETIC QN: 30 PG (ref 28–38)
IMMATURE RETIC FRACTION: 8.5 %
IRON SATN MFR SERPL: 32 % (ref 25–45)
IRON SERPL-MCNC: 59 UG/DL (ref 35–150)
RETICS #: 0.07 X10*6/UL (ref 0.02–0.11)
RETICS/RBC NFR AUTO: 2.7 % (ref 0.5–2)
TIBC SERPL-MCNC: 183 UG/DL (ref 240–445)
UIBC SERPL-MCNC: 124 UG/DL (ref 110–370)

## 2025-02-18 PROCEDURE — 97530 THERAPEUTIC ACTIVITIES: CPT | Mod: GP

## 2025-02-18 PROCEDURE — 82947 ASSAY GLUCOSE BLOOD QUANT: CPT

## 2025-02-18 PROCEDURE — 2500000001 HC RX 250 WO HCPCS SELF ADMINISTERED DRUGS (ALT 637 FOR MEDICARE OP)

## 2025-02-18 PROCEDURE — 97110 THERAPEUTIC EXERCISES: CPT | Mod: GP

## 2025-02-18 PROCEDURE — 99238 HOSP IP/OBS DSCHRG MGMT 30/<: CPT | Performed by: SURGERY

## 2025-02-18 PROCEDURE — 2500000004 HC RX 250 GENERAL PHARMACY W/ HCPCS (ALT 636 FOR OP/ED)

## 2025-02-18 PROCEDURE — 2500000002 HC RX 250 W HCPCS SELF ADMINISTERED DRUGS (ALT 637 FOR MEDICARE OP, ALT 636 FOR OP/ED)

## 2025-02-18 PROCEDURE — 2500000004 HC RX 250 GENERAL PHARMACY W/ HCPCS (ALT 636 FOR OP/ED): Performed by: PHYSICIAN ASSISTANT

## 2025-02-18 PROCEDURE — 99232 SBSQ HOSP IP/OBS MODERATE 35: CPT | Performed by: INTERNAL MEDICINE

## 2025-02-18 RX ORDER — TALC
3 POWDER (GRAM) TOPICAL NIGHTLY
Start: 2025-02-18

## 2025-02-18 RX ORDER — OXYCODONE HYDROCHLORIDE 5 MG/1
2.5 TABLET ORAL 2 TIMES DAILY
Start: 2025-02-18 | End: 2025-02-20

## 2025-02-18 RX ORDER — LYSINE HCL 500 MG
1 TABLET ORAL 2 TIMES DAILY
Start: 2025-02-18 | End: 2025-03-20

## 2025-02-18 RX ORDER — NAPROXEN SODIUM 220 MG/1
81 TABLET, FILM COATED ORAL 2 TIMES DAILY
Start: 2025-02-18

## 2025-02-18 RX ADMIN — MAGNESIUM SULFATE HEPTAHYDRATE 2 G: 40 INJECTION, SOLUTION INTRAVENOUS at 00:10

## 2025-02-18 RX ADMIN — OXYCODONE HYDROCHLORIDE 2.5 MG: 5 TABLET ORAL at 16:22

## 2025-02-18 RX ADMIN — SENNOSIDES AND DOCUSATE SODIUM 2 TABLET: 50; 8.6 TABLET ORAL at 08:03

## 2025-02-18 RX ADMIN — HEPARIN SODIUM 5000 UNITS: 5000 INJECTION, SOLUTION INTRAVENOUS; SUBCUTANEOUS at 08:03

## 2025-02-18 RX ADMIN — ACETAMINOPHEN 975 MG: 325 TABLET, FILM COATED ORAL at 16:23

## 2025-02-18 RX ADMIN — HEPARIN SODIUM 5000 UNITS: 5000 INJECTION, SOLUTION INTRAVENOUS; SUBCUTANEOUS at 16:24

## 2025-02-18 RX ADMIN — APIXABAN 2.5 MG: 2.5 TABLET, FILM COATED ORAL at 09:00

## 2025-02-18 RX ADMIN — ACETAMINOPHEN 975 MG: 325 TABLET, FILM COATED ORAL at 08:03

## 2025-02-18 RX ADMIN — POLYETHYLENE GLYCOL 3350 17 G: 17 POWDER, FOR SOLUTION ORAL at 08:03

## 2025-02-18 RX ADMIN — LEVOTHYROXINE SODIUM 88 MCG: 0.09 TABLET ORAL at 08:04

## 2025-02-18 ASSESSMENT — COGNITIVE AND FUNCTIONAL STATUS - GENERAL
DAILY ACTIVITIY SCORE: 12
MOVING TO AND FROM BED TO CHAIR: TOTAL
HELP NEEDED FOR BATHING: A LOT
STANDING UP FROM CHAIR USING ARMS: A LOT
EATING MEALS: A LOT
PERSONAL GROOMING: A LOT
MOBILITY SCORE: 13
MOBILITY SCORE: 8
MOVING FROM LYING ON BACK TO SITTING ON SIDE OF FLAT BED WITH BEDRAILS: A LOT
DRESSING REGULAR UPPER BODY CLOTHING: A LOT
TOILETING: A LOT
STANDING UP FROM CHAIR USING ARMS: TOTAL
WALKING IN HOSPITAL ROOM: TOTAL
MOVING FROM LYING ON BACK TO SITTING ON SIDE OF FLAT BED WITH BEDRAILS: A LOT
DRESSING REGULAR LOWER BODY CLOTHING: A LOT
TURNING FROM BACK TO SIDE WHILE IN FLAT BAD: A LOT
MOVING TO AND FROM BED TO CHAIR: A LOT
TURNING FROM BACK TO SIDE WHILE IN FLAT BAD: A LOT
CLIMB 3 TO 5 STEPS WITH RAILING: TOTAL
WALKING IN HOSPITAL ROOM: A LOT
CLIMB 3 TO 5 STEPS WITH RAILING: A LITTLE

## 2025-02-18 ASSESSMENT — PAIN DESCRIPTION - ORIENTATION
ORIENTATION: LEFT
ORIENTATION: RIGHT

## 2025-02-18 ASSESSMENT — PAIN SCALES - PAIN ASSESSMENT IN ADVANCED DEMENTIA (PAINAD)
FACIALEXPRESSION: SMILING OR INEXPRESSIVE
FACIALEXPRESSION: SMILING OR INEXPRESSIVE
BODYLANGUAGE: RELAXED
BODYLANGUAGE: RELAXED
BREATHING: NORMAL
TOTALSCORE: 0
CONSOLABILITY: NO NEED TO CONSOLE
TOTALSCORE: 0
TOTALSCORE: MEDICATION (SEE MAR)
CONSOLABILITY: NO NEED TO CONSOLE
TOTALSCORE: MEDICATION (SEE MAR)
BREATHING: NORMAL

## 2025-02-18 ASSESSMENT — PAIN DESCRIPTION - LOCATION
LOCATION: ARM
LOCATION: ARM

## 2025-02-18 ASSESSMENT — PAIN - FUNCTIONAL ASSESSMENT: PAIN_FUNCTIONAL_ASSESSMENT: 0-10

## 2025-02-18 ASSESSMENT — PAIN SCALES - WONG BAKER
WONGBAKER_NUMERICALRESPONSE: HURTS LITTLE BIT
WONGBAKER_NUMERICALRESPONSE: NO HURT
WONGBAKER_NUMERICALRESPONSE: HURTS LITTLE BIT

## 2025-02-18 ASSESSMENT — PAIN SCALES - GENERAL
PAINLEVEL_OUTOF10: 0 - NO PAIN
PAINLEVEL_OUTOF10: 7
PAINLEVEL_OUTOF10: 2
PAINLEVEL_OUTOF10: 0 - NO PAIN

## 2025-02-18 NOTE — PROGRESS NOTES
Physical Therapy    Physical Therapy Treatment    Patient Name: Lyndsey Rosado  MRN: [id number]  Department: [place] [address]  Room: 8017/8017-A  Today's Date: 2/18/2025  Time Calculation  Start Time: 1030  Stop Time: 1057  Time Calculation (min): 27 min         Assessment/Plan   PT Assessment  Evaluation/Treatment Tolerance: Patient limited by pain  Medical Staff Made Aware: Yes  End of Session Communication: Bedside nurse  Assessment Comment: Pt was able to perform RUE elbow flexion/extension and shoulder flexion with increased verbal cues and encouragement.  Pt performed supine <> sit with min-A and sit <> supine with min-mod A.  Pt was able to perform exercises while seated EOB and maintained static sitting balance with SBA.  Pt needs verbal cues throughout and was primarily pain limiting.  End of Session Patient Position: Bed, 3 rail up, Alarm on  PT Plan  Inpatient/Swing Bed or Outpatient: Inpatient  PT Plan  Treatment/Interventions: Bed mobility, Transfer training, Gait training  PT Plan: Ongoing PT  PT Frequency: 3 times per week  PT Discharge Recommendations: Moderate intensity level of continued care  PT Recommended Transfer Status: Total assist  PT - OK to Discharge: Yes (pending medical clearance)      General Visit Information:   PT  Visit  PT Received On: 02/18/25  Response to Previous Treatment: Patient with no complaints from previous session.  General  Family/Caregiver Present: No  Prior to Session Communication: Bedside nurse  Patient Position Received: Bed, 3 rail up, Alarm on  General Comment: Pt laying supine upon arrival, pleasant and agreeable to therapy today.    Subjective   Precautions:  Precautions  UE Weight Bearing Status: Other (Comment) (RUE: WBAT, ROMAT)  Medical Precautions: Fall precautions, Spinal precautions  Precautions Comment: TLSO for OOB activity    Objective   Pain:  Pain Assessment  Pain Assessment: 0-10  0-10 (Numeric) Pain Score: 0 - No pain  Cognition:  Cognition  Overall  Cognitive Status: Impaired at baseline  Orientation Level: Disoriented to time  Following Commands: Follows one step commands with increased time (Increased time and repetition)    Postural Control:  Postural Control  Postural Control: Within Functional Limits  Static Sitting Balance  Static Sitting-Balance Support: Feet supported  Static Sitting-Level of Assistance: Close supervision  Static Sitting-Comment/Number of Minutes: 10 mins  Extremity/Trunk Assessments:  RUE   RUE : Exceptions to WFL (Pt was able to perform AAROM elbow flexion/extension and some shoulder flexion, but was primarily pain limiting.  Pt demonstrated more muscle strength of 3/5 with elbow extension > flexion.  Needed verbal cues throughout.)  RUE Strength  RUE Overall Strength: Due to pain  LUE   LUE: Within Functional Limits  RLE   RLE : Within Functional Limits  LLE   LLE : Within Functional Limits  Activity Tolerance:  Activity Tolerance  Endurance: Endurance does not limit participation in activity  Early Mobility/Exercise Safety Screen: Proceed with mobilization - No exclusion criteria met  Activity Tolerance Comments: Primarily pain limiting, rather than fatigue  Treatments:  Therapeutic Exercise  Therapeutic Exercise Performed: Yes  Therapeutic Exercise Activity 1: Exercises while seated EOB, due to not having TLSO present in room at time of session.  20 alternating toe taps, BLE seated marches x10, BLE LAQ x10, AAROM elbow flexion/extension and shoulder flexion with RUE x10 each, and RUE  squeezes x10.    Therapeutic Activity  Therapeutic Activity Performed: Yes  Therapeutic Activity 1: Maintained static sitting balance for over 10 minutes and bed mobility with heavy cues to log roll and min-mod A.    Bed Mobility  Bed Mobility: Yes  Bed Mobility 1  Bed Mobility 1: Supine to sitting  Level of Assistance 1: Minimum assistance  Bed Mobility Comments 1: HOB elevated, heavy verbal cues for log roll to maintain spinal precautions.  Bed  Mobility 2  Bed Mobility  2: Sitting to supine  Level of Assistance 2: Minimum assistance, Moderate assistance (min-A at shoulders, mod-A at feet)  Bed Mobility Comments 2: HOB flat, maintaining log roll for spinal precautions  Bed Mobility 3  Bed Mobility 3: Rolling left, Scooting  Level of Assistance 3: Dependent, +2  Bed Mobility Comments 3: HOB flat    Ambulation/Gait Training  Ambulation/Gait Training Performed: No  Transfers  Transfer: No    Stairs  Stairs: No    Outcome Measures:  [facility] Basic Mobility  Turning from your back to your side while in a flat bed without using bedrails: A lot  Moving from lying on your back to sitting on the side of a flat bed without using bedrails: A lot  Moving to and from bed to chair (including a wheelchair): Total  Standing up from a chair using your arms (e.g. wheelchair or bedside chair): Total  To walk in hospital room: Total  Climbing 3-5 steps with railing: Total  Basic Mobility - Total Score: 8    Education Documentation  Mobility Training, taught by JOSH Kline at 2/18/2025 12:55 PM.  Learner: Patient  Readiness: Acceptance  Method: Explanation, Demonstration  Response: Verbalizes Understanding, Needs Reinforcement  Comment: PT POC, log roll for spinal precautions    Education Comments  No comments found.        OP EDUCATION:       Encounter Problems       Encounter Problems (Active)       PT Problem       Min A with bed mobility  (Progressing)       Start:  02/16/25    Expected End:  03/02/25            sit<>stand with Min A and LRD (Not Progressing)       Start:  02/16/25    Expected End:  03/02/25            amb x 50 ft with Min A and LRD (Not Progressing)       Start:  02/16/25    Expected End:  03/02/25            sit EOB  x 10 min with SBA and no acute LOB  (Progressing)       Start:  02/16/25    Expected End:  03/02/25                 JOSH KLINE

## 2025-02-18 NOTE — CARE PLAN
The patient's goals for the shift include      The clinical goals for the shift include pt will remain HDS    Over the shift, the patient will continue to progress towards her care plan goals

## 2025-02-18 NOTE — PROGRESS NOTES
Transitional Care Coordinator Note: Patient discussed in morning rounds, per medical team (trauma) patient is medically ready. Discharge dispo: Plan for patient to discharge to [facility]. Transport set for 5pm. Blue slip provided to unit secretary. TCC placed call to patient's son/POA Doni Rosado [phone] to complete IMM and provide updated on discharge and transportation time. Doni expressed understanding and appreciation of information and is agreeable to discharge plan. Trauma team and SW updated.     Liz Blanco RN BSN   Transitional Care Coordinator

## 2025-02-18 NOTE — NURSING NOTE
Patient leaving via stretcher, all belongings went with patient, IV's removed by nurse.  Nurse called in report to [place] she spoke to Lucina RN, no concerns mentioned at this time.  Family notified of department.

## 2025-02-18 NOTE — PROGRESS NOTES
Subjective   Follow-up on acute fall:    Patient was seen and examined. She is much improved. She states her pain is controlled, able to wiggle her right upper extremity now.    Objective     Current Facility-Administered Medications   Medication Dose Route Frequency Provider Last Rate Last Admin    acetaminophen (Tylenol) tablet 975 mg  975 mg oral q8h [facility] Tanna Zeng, DO   975 mg at 02/18/25 0803    [Held by provider] apixaban (Eliquis) tablet 2.5 mg  2.5 mg oral BID Tanna Zeng DO   2.5 mg at 02/18/25 0900    [Held by provider] aspirin chewable tablet 81 mg  81 mg oral Daily Tanna Zeng DO        [Held by provider] atorvastatin (Lipitor) tablet 10 mg  10 mg oral Daily Tanna Zeng DO   10 mg at 02/15/25 1858    heparin (porcine) injection 5,000 Units  5,000 Units subcutaneous q8h [facility] Tanna Zeng DO   5,000 Units at 02/18/25 0803    insulin regular (HumuLIN R,NovoLIN R) injection 0-5 Units  0-5 Units subcutaneous q4h Tanna Zeng DO        levothyroxine (Synthroid, Levoxyl) tablet 88 mcg  88 mcg oral Daily before breakfast Tanna Zeng DO   88 mcg at 02/18/25 0804    [Held by provider] losartan (Cozaar) tablet 100 mg  100 mg oral Daily Tanna Zeng DO        melatonin tablet 3 mg  3 mg oral Nightly Tanna Zeng DO        oxyCODONE (Roxicodone) immediate release tablet 2.5 mg  2.5 mg oral q6h PRN Tanna Zeng DO        oxygen (O2) therapy   inhalation Continuous PRN - O2/gases Tanna Zeng ,000 mL/hr at 02/15/25 1200 2 L/min at 02/15/25 1200    perflutren lipid microspheres (Definity) injection 0.5-10 mL of dilution  0.5-10 mL of dilution intravenous Once in imaging Tanna Zeng DO        perflutren protein A microsphere (Optison) injection 0.5 mL  0.5 mL intravenous Once in imaging Tanna Zeng DO        polyethylene glycol (Glycolax, Miralax) packet 17 g  17 g oral Daily Tanna Zeng DO   17 g  at 02/18/25 0803    sennosides-docusate sodium (Trinity-Colace) 8.6-50 mg per tablet 2 tablet  2 tablet oral BID Tanna Zeng, DO   2 tablet at 02/18/25 0803    sulfur hexafluoride microsphr (Lumason) injection 24.28 mg  2 mL intravenous Once in imaging Tanna Zeng,            Physical Exam  GEN: Alert, oriented to person, place but not time, not pale, not jaundiced, well hydrated  CVS: HS I +II, regular, no murmurs  RESP: Diminished air entry  ABD: Full, soft, BS present, nontender, no palpable organs,   EXT: No bilateral leg edema    Confusion Assessment Method(CAM) for diagnosis of delirium:    1.  Acute onset or fluctuating course: absent/present: Present  2.  Inattention: absent/present: Absent  3.  Disorganized thinking: absent/present: Present  4.  Altered level of consciousness: absent/present: Absent  CAM: negative    AT Score For Assessment of Delirium and Cognitive Impairment:    Alertness: 0  Normal(fully alert,but not agitated, throughout assessment)=0  Mild sleepiness for <10 seconds after walking, then normal=0  Clearly abnormal=4  2.  AMT4: 1  No mistakes=0  One mistake=1  Two or more mistakes/untestable=2  3.  Attention: 0  Achieves seven months or more correctly=0  Starts but scores <7 months/ refuses to start=1  Untestable(cannot start because unwell, drowsy, inattentive)=2  4.  Acute: 4  No=0  Yes=4    Total Score: 5  4 or above: Possible delirium +/- cognitive impairment  1-3: Possible cognitive impairment  0: Delirium or severe cognitive impairment unlikely(but delirium still possible if (4) information incomplete)      Last Recorded Vitals      2/17/2025     4:00 AM 2/17/2025     7:50 AM 2/17/2025    11:15 AM 2/17/2025     4:51 PM 2/17/2025     7:12 PM 2/18/2025     3:00 AM 2/18/2025     8:25 AM   Vitals   Systolic   154 123 154 153 173   Diastolic   63 53 52 56 69   BP Location   Right leg Right arm Right arm Right arm    Heart Rate   93 77 84 107 77   Temp -- -- 36.8 °C (98.2  °F)    36.5 °C (97.7 °F)   Resp   17 17 17 18 17      There were no vitals filed for this visit.     Relevant Results  Lab Results   Component Value Date    TSH 2.30 02/17/2025    BVPZYLOK90 265 02/17/2025    VITD25 45 02/17/2025    HGBA1C 5.2 01/21/2025     Results from last 7 days   Lab Units 02/17/25  1822 02/14/25  1746   WBC AUTO x10*3/uL 6.8 7.5   HEMOGLOBIN g/dL 7.7* 10.4*   HEMATOCRIT % 25.6* 33.8*   ALT U/L  --  8   AST U/L  --  13   SODIUM mmol/L 139 138   POTASSIUM mmol/L 4.8 4.9   CHLORIDE mmol/L 105 105   CREATININE mg/dL 1.30* 2.16*   BUN mg/dL 29* 38*   CO2 mmol/L 25 24   INR   --  1.3*          FL fluoro images no charge  These images are not reportable by radiology and will not be interpreted   by  Radiologists.  XR humerus right  Narrative: STUDY:  Humerus Radiographs; 2/15/2025  2:56  INDICATION:  Status post fall.  COMPARISON:  XR Right Humerus 2/14/2025.  ACCESSION NUMBER(S):  [id number]  ORDERING CLINICIAN:  BEKA RANDOLPH  TECHNIQUE:  Two view(s) of the right humerus.  Findings:  Acute transverse fracture through the distal third of the right  humeral diaphysis as demonstrated on earlier elbow radiographs.  Lateral side plate with multiple associated screws in the mid humeral  diaphysis.  Impression: Acute transverse fracture through the distal third of the right  humeral diaphysis as demonstrated on earlier elbow radiographs.  Signed by Roderick Rivera MD  XR thoracic spine 2 views  Narrative: STUDY:  Thoracic and lumbar spine radiographs; 2/15/2025 at 2:56 AM.  INDICATION:  T12 fracture.  COMPARISON:  CT CAP/thoracic/lumbar 2/14/2025; CT thoracic/lumbar 10/19/2021.  ACCESSION NUMBER(S):  [id number], [id number]  ORDERING CLINICIAN:  BEKA RANDOLPH  TECHNIQUE:  Two view(s) of the thoracic spine and three view(s) of the  lumbar spine.  FINDINGS:    Thoracic spine:  The alignment is anatomic.  There is compression fracture of T12 of  undetermined age. Mild multilevel degenerative disc  changes are noted  in the thoracic spine more prominent in the mid and lower thoracic  spine.  Lumbar spine:  The alignment is anatomic.  Vertebral body height is normal without  compression deformity. There is multilevel degenerative spondylosis  more prominent at L5-S1.  Impression: 1.Compression fracture of T12 of undetermined age.  2.Multilevel degenerative disc changes are noted in the thoracic  and lumbar spine more prominent at L5-S1 .  Signed by Ganga Andrews MD  XR lumbar spine 2-3 views  Narrative: STUDY:  Thoracic and lumbar spine radiographs; 2/15/2025 at 2:56 AM.  INDICATION:  T12 fracture.  COMPARISON:  CT CAP/thoracic/lumbar 2/14/2025; CT thoracic/lumbar 10/19/2021.  ACCESSION NUMBER(S):  [id number], [id number]  ORDERING CLINICIAN:  BEKA RANDOLPH  TECHNIQUE:  Two view(s) of the thoracic spine and three view(s) of the  lumbar spine.  FINDINGS:    Thoracic spine:  The alignment is anatomic.  There is compression fracture of T12 of  undetermined age. Mild multilevel degenerative disc changes are noted  in the thoracic spine more prominent in the mid and lower thoracic  spine.  Lumbar spine:  The alignment is anatomic.  Vertebral body height is normal without  compression deformity. There is multilevel degenerative spondylosis  more prominent at L5-S1.  Impression: 1.Compression fracture of T12 of undetermined age.  2.Multilevel degenerative disc changes are noted in the thoracic  and lumbar spine more prominent at L5-S1 .  Signed by Ganga Andrews MD  FL less than 1 hour  These images are not reportable by radiology and will not be interpreted   by  Radiologists.  XR shoulder right 2+ views  Narrative: STUDY:  Shoulder Radiographs; 02/14/2025 at 11:50 PM  INDICATION:  Evaluation post trauma, ortho requesting axillary view.  COMPARISON:  XR right humerus (two studies) 02/14/25.  ACCESSION NUMBER(S):  [id number]  ORDERING CLINICIAN:  LEONEL JUNG  TECHNIQUE:  Three view(s) of the right shoulder  (four images).  FINDINGS:    There is no displaced fracture.  The alignment is anatomic.  No soft  tissue abnormality is seen.  Impression: No acute osseous abnormality of the shoulder girdle.  Partially imaged  humerus fracture noted.  Signed by Joaquin Keller MD  XR elbow right 1-2 views  Narrative: STUDY:  Elbow Radiographs; 2/14/2025 at 11:50 PM.  INDICATION:  Fall.  COMPARISON:  None available.  ACCESSION NUMBER(S):  [id number]  ORDERING CLINICIAN:  LEONEL JUNG  TECHNIQUE:  Two view(s) of the right elbow (four images).  FINDINGS:    No elbow dislocation.  Soft tissues are irregular in appearance at the  fracture level.  There is no joint effusion.  Impression: Acute fracture of the humerus just below the level of the orthopedic  hardware.  Approximately 45 degrees of posterior angulation and 2  shaft widths of lateral displacement.  Signed by Joaquin Keller MD    DATA:  EKG: QTC  Encounter Date: 12/08/24   ECG 12 lead   Result Value    Ventricular Rate 65    Atrial Rate 65    IN Interval 162    QRS Duration 116    QT Interval 446    QTC Calculation(Bazett) 463    P Axis 66    R Axis 24    T Axis 11    QRS Count 10    Q Onset 219    P Onset 138    P Offset 186    T Offset 442    QTC Fredericia 457    Narrative    Normal sinus rhythm  Incomplete right bundle branch block  When compared with ECG of 21-JUN-2024 16:03,  Sinus rhythm is no longer with 2nd degree AV block  Incomplete right bundle branch block has replaced Right bundle branch block  Reconfirmed by Skyler Blanchard ([id number]) on 12/10/2024 3:00:10 PM      Anti-psychotics in 48 hours: No  Opioids/Benzodiazepines in 48 hours: Oxycodone  Anticholinergics on board:No  Restraints:No  Indwelling catheters:No  Last BM: 2/17/25  UO in 24 hours: 400 mls  Activity in the past 24 hours: Working with PT/OT  Need for ambulatory devices: Front wheel walker prior to admission      Assessment/Plan   84-year-old with multiple comorbidities including hypertension,  osteoporosis comes in after a fall and sustains right humeral fracture.  Geriatrics was consulted for questionable syncopal fall     Acute fall/syncope, h/o recent recurrent falls.  Acute right fernando-implant humeral shaft fracture, fragility fracture, s/p IMN Right humerus on 2/15/25  Acute compression fracture T12  Acute hyperactive delirium, multifactorial, probably pain related, poor sleep, in a patient with baseline dementia, appears resolved  Hypertension  Hyperlipidemia  Type II DM, Hgba1c 5.2, goal A1c <8.5  SHEILA on CKD stage III, resolving, back to baseline  Hypothyroidism  Dementia, severe, no MOCA on file  Osteoporosis, no recent DEXA scan  Hearing loss  Anemia, probably acute blood loss, drop in Hb to 7.7 from 10.4  Vitamin B12 deficiency- 265  Polypharmacy  Hypophosphatemia/Hypomagnesemia     Plan:  Please check orthostatic vitals when able  Continue with pain control with scheduled Tylenol 975mg TID, oxycodone 2.5mg Q6h prn for severe pain  Continue with melatonin 3mg at bedtime  Recommend discontinuing point of care glucose checks as well as ISS as BS have been less than 180 consistently   Recommend not to resume Actos, Benadryl, docusate on discharge  Recommend supplementation with oral vitamin B12 1000mcg daily  Continue off Eliquis as it is not on patient's med dispense history  Continue on trial of oxycodone 2.5mg BID a couple more days then prn as patient may not be able to reliably tell nursing staff she is in pain. Will need to monitor for lethargy  Start melatonin 3mg at bedtime  Ok for Seroquel 12.5mg Q6hrs prn for severe agitation  Referral to endocrinology for DEXA scan and management of osteoporosis    4M AGE-FRIENDLY INITIATIVE:  What matters most to patient: Unable to obtain  Medications: Oxycodone, Haldol  Mentation: CAM positive, 4AT  Mobility: Total assist, working with PT/OT    Geriatric medicine will continue to follow the patient. Thank you for allowing geriatric medicine to be  involved in the care of your patient. Geriatric medicine consultation team is available during work hours Monday through Friday. For any emergency issues requiring immediate assistance over the weekend, please page Geriatrics pager [phone]    Estefany Godfrey MD

## 2025-02-18 NOTE — PROGRESS NOTES
Social Work Note:    LISW reviewed patient's chart and saw the patient was accepted at [facility].  LISW asked TCC if patient was still medically ready for discharge. LISW will continue to follow    ADD   LISW spoke with the patient's medical team and TCC who reported that th epatient is medically ready for discharge.  LISW set up transport for 5pm today.  LISW will continue to follow    JUAN C Crawford, BEBES

## 2025-02-18 NOTE — DISCHARGE SUMMARY
Discharge Diagnosis  Displaced transverse fracture of shaft of humerus, right arm, initial encounter for closed fracture    Issues Requiring Follow-Up  Follow up with orthopedic surgery in 2 weeks.   Follow up with neurosurgery in 2 and 6 weeks.   Dressing can be removed 2/22/25.   Repeat blood draw to monitor hemoglobin post-op.   Referral sent to dermatology.   Referral sent to endocrinology.    Test Results Pending At Discharge  Pending Labs       No current pending labs.            Hospital Course  84 year old female presents as trauma consult transfer from outside hospital after a syncopal fall at [facility]. Imaging showed T12 compression fracture and right humerus fracture. Neuro-spine recommended T/L uprights and a soft brace for comfort. No acute intervention. Orthopedics recommended operative fixation of right humerus. Patient to OR with orthopedics on 2/15 for removal of hardware and IMN of right humerus. Patient admitted to trauma floor with telemetry. Syncopal workup performed and negative, including troponin and EKG. Patient with SHEILA which improved to baseline creatinine with some maintenance IVF. Patient was agitated, suspected due to dementia/delirium. She improved throughout her stay and returned to apparent baseline. Home medications were resumed as appropriate. Geriatrics was consulted and recommended oxycodone 2.5 BID for a couple days to maintain her pain control. They also recommended not restarting Actos on discharge. PT/OT evaluated patient and recommended SNF at discharge. She was accepted back at [facility], and discharged 2/18.     Pertinent Physical Exam At Time of Discharge  Eyes:      Extraocular Movements: Extraocular movements intact.   Cardiovascular:      Rate and Rhythm: Normal rate.      Pulses: Normal pulses.   Pulmonary:      Effort: Pulmonary effort is normal. No respiratory distress.   Abdominal:      General: There is no distension.      Palpations: Abdomen is soft.    Musculoskeletal:      Comments: Moving all extremities, with improved but limited motion RUE. Right hand swelling improved. RUE wrapped with ACE wrap.   Skin:     General: Skin is warm and dry.      Capillary Refill: Capillary refill takes less than 2 seconds.   Neurological:      Mental Status: She is alert.      Comments: Confused, cooperative    Home Medications     Medication List      START taking these medications     calcium carbonate-vit D3-min 600 mg-10 mcg (400 unit) tablet; Take 1   tablet by mouth 2 times a day.   melatonin 3 mg tablet; Take 1 tablet (3 mg) by mouth once daily at   bedtime.   oxyCODONE 5 mg immediate release tablet; Commonly known as: Roxicodone;   Take 0.5 tablets (2.5 mg) by mouth 2 times a day for 2 days.     CHANGE how you take these medications     aspirin 81 mg chewable tablet; Chew 1 tablet (81 mg) 2 times a day.;   What changed: when to take this     CONTINUE taking these medications     acetaminophen 325 mg tablet; Commonly known as: Tylenol   Allergy (diphenhydrAMINE) 25 mg capsule; Generic drug: diphenhydrAMINE   atorvastatin 10 mg tablet; Commonly known as: Lipitor   bisacodyl 10 mg suppository; Commonly known as: Dulcolax   dextromethorphan-guaifenesin  mg/10 mL liquid in packet   docusate sodium 100 mg capsule; Commonly known as: Colace   Fleet Enema 19-7 gram/118 mL enema enema; Generic drug: sodium   phosphates   levothyroxine 88 mcg capsule; Commonly known as: Tirosint   losartan 100 mg tablet; Commonly known as: Cozaar   magnesium hydroxide 400 mg/5 mL suspension; Commonly known as: Milk of   Magnesia   ondansetron 4 mg tablet; Commonly known as: Zofran     STOP taking these medications     Eliquis 2.5 mg tablet; Generic drug: apixaban   pioglitazone 15 mg tablet; Commonly known as: Actos       Outpatient Follow-Up  Future Appointments   Date Time Provider Department [place]   3/3/2025 10:30 AM MAGGIE Urrutia-CNP [id number] [place]   3/4/2025 11:20 AM Christianne  ROSIO Fernandes [id number] [facility]       Tanna Zeng, DO

## 2025-03-03 ENCOUNTER — OFFICE VISIT (OUTPATIENT)
Facility: CLINIC | Age: 85
End: 2025-03-03
Payer: MEDICARE

## 2025-03-03 VITALS
DIASTOLIC BLOOD PRESSURE: 78 MMHG | HEART RATE: 88 BPM | RESPIRATION RATE: 16 BRPM | HEIGHT: 62 IN | SYSTOLIC BLOOD PRESSURE: 112 MMHG | BODY MASS INDEX: 38.64 KG/M2 | WEIGHT: 210 LBS | TEMPERATURE: 97.5 F

## 2025-03-03 DIAGNOSIS — S22.080A CLOSED WEDGE COMPRESSION FRACTURE OF T12 VERTEBRA, INITIAL ENCOUNTER (MULTI): Primary | ICD-10-CM

## 2025-03-03 PROCEDURE — 99214 OFFICE O/P EST MOD 30 MIN: CPT | Performed by: NURSE PRACTITIONER

## 2025-03-03 PROCEDURE — 3074F SYST BP LT 130 MM HG: CPT | Performed by: NURSE PRACTITIONER

## 2025-03-03 PROCEDURE — 1159F MED LIST DOCD IN RCRD: CPT | Performed by: NURSE PRACTITIONER

## 2025-03-03 PROCEDURE — 3078F DIAST BP <80 MM HG: CPT | Performed by: NURSE PRACTITIONER

## 2025-03-03 PROCEDURE — 1111F DSCHRG MED/CURRENT MED MERGE: CPT | Performed by: NURSE PRACTITIONER

## 2025-03-03 PROCEDURE — 1036F TOBACCO NON-USER: CPT | Performed by: NURSE PRACTITIONER

## 2025-03-03 PROCEDURE — 1125F AMNT PAIN NOTED PAIN PRSNT: CPT | Performed by: NURSE PRACTITIONER

## 2025-03-03 ASSESSMENT — PAIN SCALES - GENERAL: PAINLEVEL_OUTOF10: 6

## 2025-03-03 ASSESSMENT — PATIENT HEALTH QUESTIONNAIRE - PHQ9
1. LITTLE INTEREST OR PLEASURE IN DOING THINGS: NOT AT ALL
2. FEELING DOWN, DEPRESSED OR HOPELESS: NOT AT ALL
SUM OF ALL RESPONSES TO PHQ9 QUESTIONS 1 AND 2: 0

## 2025-03-03 NOTE — PROGRESS NOTES
[facility]  Department of Neurological Surgery  Established Patient Visit    History of Present Illness  Lyndsey Rosado is a 84 y.o. female who presents to the spine clinic in follow up for a T12 fracture that was identified on imaging studies 2/14/25 after she presented to [facility] from an outside hospital following a fall. No surgical intervention was recommended and patient was fitted with a TLSO brace. She was discharged back to [facility] on 2/18/25 where she remains. She reports she has been doing well since discharge from the hospital. She is able to complete her exercises at her nursing facility. She denies any significant back pain. Denies any radicular leg pain or paresthesias.         ROS x 10 is, otherwise, negative unless documented in HPI above    Patient Active Problem List   Diagnosis    Mixed hyperlipidemia    Hypothyroidism, unspecified    Essential hypertension    Anemia    Stage 3b chronic kidney disease (Multi)    Vitamin D deficiency    Syncope    Cardiac arrhythmia    Hypomagnesemia    Syncope, near    Fall in elderly patient    Closed fracture of nasal bones    Displaced transverse fracture of shaft of humerus, right arm, initial encounter for closed fracture     Past Medical History:   Diagnosis Date    Anemia     Arthritis     Diabetes mellitus (Multi)     Disease of thyroid gland     Diverticulitis     [place] (hard of hearing)     Hyperlipidemia     Hypertension     Hypothyroid     Renal disorder      History reviewed. No pertinent surgical history.  Social History     Tobacco Use    Smoking status: Never    Smokeless tobacco: Never   Substance Use Topics    Alcohol use: Never     family history is not on file.    Current Outpatient Medications:     acetaminophen (Tylenol) 325 mg tablet, Take 2 tablets (650 mg) by mouth every 8 hours if needed for mild pain (1 - 3) or fever (temp greater than 38.0 C)., Disp: , Rfl:     aspirin 81 mg chewable tablet,  Chew 1 tablet (81 mg) 2 times a day., Disp: , Rfl:     atorvastatin (Lipitor) 10 mg tablet, Take 1 tablet (10 mg) by mouth once daily., Disp: , Rfl:     bisacodyl (Dulcolax) 10 mg suppository, Insert 1 suppository (10 mg) into the rectum once daily., Disp: , Rfl:     calcium carbonate-vit D3-min 600 mg-10 mcg (400 unit) tablet, Take 1 tablet by mouth 2 times a day., Disp: , Rfl:     dextromethorphan-guaifenesin  mg/10 mL liquid in packet, Take 10 mL by mouth every 4 hours if needed (cough)., Disp: , Rfl:     diphenhydrAMINE (Allergy, diphenhydrAMINE,) 25 mg capsule, Take 1 capsule (25 mg) by mouth every 6 hours if needed for itching., Disp: , Rfl:     docusate sodium (Colace) 100 mg capsule, Take 1 capsule (100 mg) by mouth 2 times a day as needed for constipation., Disp: , Rfl:     levothyroxine (Tirosint) 88 mcg capsule, Take 1 capsule (88 mcg) by mouth once daily in the morning. Take before meals., Disp: , Rfl:     losartan (Cozaar) 100 mg tablet, Take 1 tablet (100 mg) by mouth once daily., Disp: , Rfl:     magnesium hydroxide (Milk of Magnesia) 400 mg/5 mL suspension, Take 30 mL by mouth once daily as needed for constipation., Disp: , Rfl:     melatonin 3 mg tablet, Take 1 tablet (3 mg) by mouth once daily at bedtime., Disp: , Rfl:     ondansetron (Zofran) 4 mg tablet, Take 1 tablet (4 mg) by mouth every 8 hours if needed for nausea or vomiting., Disp: , Rfl:     sodium phosphates (Fleet Enema) 19-7 gram/118 mL enema enema, Insert 133 mL (1 enema) into the rectum once daily as needed for constipation., Disp: , Rfl:   Allergies   Allergen Reactions    Penicillin G Unknown     Other Reaction(s): Unknown    Penicillins Unknown       Physical Examination:    General: Well developed, awake/alert/oriented x3,forgetful at times.  No distress, alert and cooperative  Skin: Warm and dry, no lesions, no rashes  ENMT: Mucous membranes moist, no apparent injury, no lesions seen  Head/Neck: Neck Supple, no apparent  injury  Respiratory/Thorax: Normal breath sounds with good chest expansion, thorax symmetric  Cardiovascular: No pitting edema, no JVD    Motor Strength:   LUE 5/5 throughout  RUE grasps 5/5, ROM reduced due to recent surgery  BLE 5/5 throughout    Muscle Bulk: Normal and symmetric in all extremities    Posture:   -- Cervical: Normal  -- Thoracic: Normal  -- Lumbar : Normal  Paraspinal muscle spasm/tenderness absent.     No tenderness to palpation along spinal processes.     Sensation: intact to light touch      Assessment and Plan:      Lyndsey Rosado is a 84 y.o.  female who presents to the spine clinic in follow up with T12 fracture. Clinically she is doing well and remains neurologically intact. She is ok to continue activity as tolerated. Can continue TLSO as needed for comfort when ambulatory. Will obtain follow up X-rays of the thoracic spine in about a month to monitor progression of healing. She will follow up in clinic in about a month's time as well. She is agreeable to this plan.          The above clinical summary has been dictated with voice recognition software. It has not been proofread for grammatical errors, typographical mistakes, or other semantic inconsistencies.     Thank you for visiting our office today. It was our pleasure to take part in your healthcare.      Do not hesitate to call with any questions regarding your plan of care after leaving at [phone] M-F 8am-4pm.      To clinicians, thank you very much for this kind referral. It is a privilege to partner with you in the care of your patients. My office would be delighted to assist you with any further consultations or with questions regarding the plan of care outlined. Do not hesitate to call the office or contact me directly.         Sincerely,          [facility]  [address] [facility]  [address]  [place] [id number]    [address] [place] [address]  [address]  [facility]  [place] [address]     Phone: [phone]  Fax: [phone]

## 2025-03-04 ENCOUNTER — OFFICE VISIT (OUTPATIENT)
Dept: ORTHOPEDIC SURGERY | Facility: HOSPITAL | Age: 85
End: 2025-03-04
Payer: MEDICARE

## 2025-03-04 ENCOUNTER — HOSPITAL ENCOUNTER (OUTPATIENT)
Dept: RADIOLOGY | Facility: HOSPITAL | Age: 85
Discharge: HOME | End: 2025-03-04
Payer: MEDICARE

## 2025-03-04 DIAGNOSIS — S42.321A DISPLACED TRANSVERSE FRACTURE OF SHAFT OF HUMERUS, RIGHT ARM, INITIAL ENCOUNTER FOR CLOSED FRACTURE: Primary | ICD-10-CM

## 2025-03-04 DIAGNOSIS — S42.321A DISPLACED TRANSVERSE FRACTURE OF SHAFT OF HUMERUS, RIGHT ARM, INITIAL ENCOUNTER FOR CLOSED FRACTURE: ICD-10-CM

## 2025-03-04 PROCEDURE — 1159F MED LIST DOCD IN RCRD: CPT

## 2025-03-04 PROCEDURE — 99211 OFF/OP EST MAY X REQ PHY/QHP: CPT

## 2025-03-04 PROCEDURE — 73060 X-RAY EXAM OF HUMERUS: CPT | Mod: RT

## 2025-03-04 PROCEDURE — 1111F DSCHRG MED/CURRENT MED MERGE: CPT

## 2025-03-04 PROCEDURE — 73030 X-RAY EXAM OF SHOULDER: CPT | Mod: RT

## 2025-03-04 PROCEDURE — 1036F TOBACCO NON-USER: CPT

## 2025-03-05 NOTE — PROGRESS NOTES
Subjective    Patient ID: Lyndsey Rosado is a 84 y.o. female.    Chief Complaint: Post-op of the Right Arm     Last Surgery: Removal of right humerus hardware and insertion of right humerus intramedullary martin  Last Surgery Date: 2/15/2025    HPI  Patient is a 84 y.o. female who is s/p Removal of right humerus hardware and insertion of right humerus intramedullary martin. Date of surgery was 2/15/2025.  Patient continues to be weight bearing as tolerated on the right arm at this time and denies issues with incision. Patient continues on ASA for DVT ppx. Patient continues with therapy sessions at skilled nursing facility. Patient denies fever or chills, N/T or arm pain.     ROS: All other systems have been reviewed and are negative except as previously noted in history of present illness.      IMP:  Problem List Items Addressed This Visit       Displaced transverse fracture of shaft of humerus, right arm, initial encounter for closed fracture - Primary    Relevant Orders    XR shoulder right 2+ views (Completed)    XR humerus right (Completed)     Objective     General: Alert and oriented x 3, NAD, respirations easy and unlabored with no audible wheezes, skin warm and dry, speech and dress appropriate for noted age, affect euthymic.     Musculoskeletal: Right Upper Extremity  incision well-healed  compartments soft  mild swelling to upper extremity  sensation intact to light touch  motor intact including R/U/M/AIN/PIN nn.  palpable radial pulse 2+       X-ray: Images of right humerus and shoulder reviewed personally by me today and reveal maintenance of alignment of humeral shaft fracture with hardware in position and no interval change.      Assessment/Plan   Encounter Diagnoses:  Displaced transverse fracture of shaft of humerus, right arm, initial encounter for closed fracture    PLAN: Patient is s/p Removal of right humerus hardware and insertion of right humerus intramedullary martin. Sutures were removed at this  visit. Patient overall is doing well. She is weight bearing as tolerated on the right arm. Patient working with PT at [facility]. Imaging reveals alignment of right humeral shaft fracture. Patient will continue to be weight bearing as tolearted on the right arm. She will continue to work with PT on shoulder and elbow ROM, arm stretching, and strengthening exercises. Patient will follow up in 3 months. Patient is in agreement with this plan. Xrays of the right humerus and shoulder will be needed.     Orders Placed This Encounter    XR shoulder right 2+ views    XR humerus right     No follow-ups on file.

## 2025-03-25 ENCOUNTER — APPOINTMENT (OUTPATIENT)
Dept: ORTHOPEDIC SURGERY | Facility: HOSPITAL | Age: 85
End: 2025-03-25
Payer: MEDICARE

## 2025-06-06 ENCOUNTER — HOSPITAL ENCOUNTER (EMERGENCY)
Facility: HOSPITAL | Age: 85
Discharge: HOME | End: 2025-06-06
Attending: EMERGENCY MEDICINE
Payer: MEDICARE

## 2025-06-06 ENCOUNTER — APPOINTMENT (OUTPATIENT)
Dept: RADIOLOGY | Facility: HOSPITAL | Age: 85
End: 2025-06-06
Payer: MEDICARE

## 2025-06-06 VITALS
HEIGHT: 64 IN | RESPIRATION RATE: 18 BRPM | BODY MASS INDEX: 33.29 KG/M2 | DIASTOLIC BLOOD PRESSURE: 62 MMHG | HEART RATE: 62 BPM | SYSTOLIC BLOOD PRESSURE: 151 MMHG | OXYGEN SATURATION: 97 % | WEIGHT: 195 LBS | TEMPERATURE: 97.5 F

## 2025-06-06 DIAGNOSIS — K59.00 CONSTIPATION, UNSPECIFIED CONSTIPATION TYPE: Primary | ICD-10-CM

## 2025-06-06 LAB
ALBUMIN SERPL BCP-MCNC: 3.5 G/DL (ref 3.4–5)
ALP SERPL-CCNC: 63 U/L (ref 33–136)
ALT SERPL W P-5'-P-CCNC: 10 U/L (ref 7–45)
ANION GAP SERPL CALC-SCNC: 13 MMOL/L (ref 10–20)
AST SERPL W P-5'-P-CCNC: 19 U/L (ref 9–39)
BASOPHILS # BLD AUTO: 0.05 X10*3/UL (ref 0–0.1)
BASOPHILS NFR BLD AUTO: 0.5 %
BILIRUB SERPL-MCNC: 0.6 MG/DL (ref 0–1.2)
BUN SERPL-MCNC: 37 MG/DL (ref 6–23)
CALCIUM SERPL-MCNC: 9.6 MG/DL (ref 8.6–10.3)
CHLORIDE SERPL-SCNC: 90 MMOL/L (ref 98–107)
CO2 SERPL-SCNC: 35 MMOL/L (ref 21–32)
CREAT SERPL-MCNC: 1.58 MG/DL (ref 0.5–1.05)
EGFRCR SERPLBLD CKD-EPI 2021: 32 ML/MIN/1.73M*2
EOSINOPHIL # BLD AUTO: 0.02 X10*3/UL (ref 0–0.4)
EOSINOPHIL NFR BLD AUTO: 0.2 %
ERYTHROCYTE [DISTWIDTH] IN BLOOD BY AUTOMATED COUNT: 13.7 % (ref 11.5–14.5)
GLUCOSE SERPL-MCNC: 191 MG/DL (ref 74–99)
HCT VFR BLD AUTO: 41.5 % (ref 36–46)
HGB BLD-MCNC: 12.8 G/DL (ref 12–16)
IMM GRANULOCYTES # BLD AUTO: 0.03 X10*3/UL (ref 0–0.5)
IMM GRANULOCYTES NFR BLD AUTO: 0.3 % (ref 0–0.9)
LYMPHOCYTES # BLD AUTO: 1.32 X10*3/UL (ref 0.8–3)
LYMPHOCYTES NFR BLD AUTO: 13.6 %
MAGNESIUM SERPL-MCNC: 2.2 MG/DL (ref 1.6–2.4)
MCH RBC QN AUTO: 29 PG (ref 26–34)
MCHC RBC AUTO-ENTMCNC: 30.8 G/DL (ref 32–36)
MCV RBC AUTO: 94 FL (ref 80–100)
MONOCYTES # BLD AUTO: 0.61 X10*3/UL (ref 0.05–0.8)
MONOCYTES NFR BLD AUTO: 6.3 %
NEUTROPHILS # BLD AUTO: 7.69 X10*3/UL (ref 1.6–5.5)
NEUTROPHILS NFR BLD AUTO: 79.1 %
NRBC BLD-RTO: 0 /100 WBCS (ref 0–0)
PLATELET # BLD AUTO: 243 X10*3/UL (ref 150–450)
POTASSIUM SERPL-SCNC: 3.8 MMOL/L (ref 3.5–5.3)
PROT SERPL-MCNC: 6.6 G/DL (ref 6.4–8.2)
RBC # BLD AUTO: 4.41 X10*6/UL (ref 4–5.2)
SODIUM SERPL-SCNC: 134 MMOL/L (ref 136–145)
WBC # BLD AUTO: 9.7 X10*3/UL (ref 4.4–11.3)

## 2025-06-06 PROCEDURE — 99285 EMERGENCY DEPT VISIT HI MDM: CPT

## 2025-06-06 PROCEDURE — 36415 COLL VENOUS BLD VENIPUNCTURE: CPT | Performed by: EMERGENCY MEDICINE

## 2025-06-06 PROCEDURE — 74177 CT ABD & PELVIS W/CONTRAST: CPT | Performed by: RADIOLOGY

## 2025-06-06 PROCEDURE — 83735 ASSAY OF MAGNESIUM: CPT | Performed by: EMERGENCY MEDICINE

## 2025-06-06 PROCEDURE — 84075 ASSAY ALKALINE PHOSPHATASE: CPT | Performed by: EMERGENCY MEDICINE

## 2025-06-06 PROCEDURE — 96361 HYDRATE IV INFUSION ADD-ON: CPT | Performed by: EMERGENCY MEDICINE

## 2025-06-06 PROCEDURE — 2500000004 HC RX 250 GENERAL PHARMACY W/ HCPCS (ALT 636 FOR OP/ED): Performed by: EMERGENCY MEDICINE

## 2025-06-06 PROCEDURE — 85025 COMPLETE CBC W/AUTO DIFF WBC: CPT | Performed by: EMERGENCY MEDICINE

## 2025-06-06 PROCEDURE — 2550000001 HC RX 255 CONTRASTS: Performed by: EMERGENCY MEDICINE

## 2025-06-06 PROCEDURE — 96360 HYDRATION IV INFUSION INIT: CPT | Mod: 59 | Performed by: EMERGENCY MEDICINE

## 2025-06-06 PROCEDURE — 82310 ASSAY OF CALCIUM: CPT | Performed by: EMERGENCY MEDICINE

## 2025-06-06 PROCEDURE — 74177 CT ABD & PELVIS W/CONTRAST: CPT

## 2025-06-06 PROCEDURE — 99285 EMERGENCY DEPT VISIT HI MDM: CPT | Mod: 25 | Performed by: EMERGENCY MEDICINE

## 2025-06-06 RX ORDER — ALUMINUM HYDROXIDE, MAGNESIUM HYDROXIDE, AND SIMETHICONE 1200; 120; 1200 MG/30ML; MG/30ML; MG/30ML
30 SUSPENSION ORAL EVERY 4 HOURS PRN
COMMUNITY

## 2025-06-06 RX ORDER — DULOXETIN HYDROCHLORIDE 30 MG/1
30 CAPSULE, DELAYED RELEASE ORAL DAILY
COMMUNITY

## 2025-06-06 RX ORDER — NAPROXEN SODIUM 220 MG/1
81 TABLET, FILM COATED ORAL DAILY
COMMUNITY

## 2025-06-06 RX ORDER — PIOGLITAZONE 15 MG/1
15 TABLET ORAL DAILY
COMMUNITY

## 2025-06-06 RX ORDER — PROMETHAZINE HYDROCHLORIDE 25 MG/1
25 TABLET ORAL EVERY 6 HOURS PRN
COMMUNITY

## 2025-06-06 RX ORDER — ACETAMINOPHEN 650 MG/1
650 SUPPOSITORY RECTAL EVERY 4 HOURS PRN
COMMUNITY

## 2025-06-06 RX ORDER — PHENOL/SODIUM PHENOLATE
20 AEROSOL, SPRAY (ML) MUCOUS MEMBRANE EVERY MORNING
COMMUNITY

## 2025-06-06 RX ORDER — POLYETHYLENE GLYCOL 3350 17 G/17G
17 POWDER, FOR SOLUTION ORAL 3 TIMES DAILY
Qty: 21 PACKET | Refills: 0 | Status: SHIPPED | OUTPATIENT
Start: 2025-06-06 | End: 2025-06-13

## 2025-06-06 RX ORDER — PNV NO.95/FERROUS FUM/FOLIC AC 28MG-0.8MG
1 TABLET ORAL 2 TIMES DAILY
COMMUNITY

## 2025-06-06 RX ADMIN — SODIUM CHLORIDE, POTASSIUM CHLORIDE, SODIUM LACTATE AND CALCIUM CHLORIDE 1000 ML: 600; 310; 30; 20 INJECTION, SOLUTION INTRAVENOUS at 10:08

## 2025-06-06 RX ADMIN — IOHEXOL 75 ML: 350 INJECTION, SOLUTION INTRAVENOUS at 11:44

## 2025-06-06 ASSESSMENT — LIFESTYLE VARIABLES
EVER FELT BAD OR GUILTY ABOUT YOUR DRINKING: NO
HAVE YOU EVER FELT YOU SHOULD CUT DOWN ON YOUR DRINKING: NO
TOTAL SCORE: 0
EVER HAD A DRINK FIRST THING IN THE MORNING TO STEADY YOUR NERVES TO GET RID OF A HANGOVER: NO
HAVE PEOPLE ANNOYED YOU BY CRITICIZING YOUR DRINKING: NO

## 2025-06-06 ASSESSMENT — PAIN - FUNCTIONAL ASSESSMENT: PAIN_FUNCTIONAL_ASSESSMENT: 0-10

## 2025-06-06 ASSESSMENT — PAIN SCALES - GENERAL: PAINLEVEL_OUTOF10: 0 - NO PAIN

## 2025-06-06 NOTE — DISCHARGE INSTRUCTIONS
You were seen here for constipation.  I recommend that you drink MiraLAX about 3-4 times a day until you have a bowel movement.  Continue your home bowel regimen for constipation.  Stay well-hydrated and eat foods high in fiber.  Follow-up with your primary care provider to discuss constipation as well as further workup for your pancreas nodule.  Return to ED for new or worsening of symptoms.

## 2025-06-06 NOTE — ED PROVIDER NOTES
History of Present Illness       History provided by: Patient  Limitations to History: Patient Age  External Records Reviewed with Brief Summary: Notes reviewed in patient's chart    HPI:  HPI     Lyndsey Rosado is a 84-year-old female with PMH of HTN, CKD 3, DM2, dementia, anemia, diverticulitis presenting for constipation.  Patient states she is unsure when her last bowel movement was.  Per triage note constipation x 1 week.  States she is still passing gas.  Denies any abdominal pain, nausea/vomiting, chest pain, shortness of breath, fever, chills, body aches, rectal pain, blood from the rectum, urinary symptoms.  States she has been eating but unable to pass stool.    Physical Exam   Physical Exam  Exam conducted with a chaperone present.   Constitutional:       Appearance: Normal appearance.   HENT:      Head: Normocephalic.      Nose: Nose normal.      Mouth/Throat:      Mouth: Mucous membranes are moist.   Eyes:      Extraocular Movements: Extraocular movements intact.   Cardiovascular:      Rate and Rhythm: Normal rate and regular rhythm.   Pulmonary:      Effort: Pulmonary effort is normal.      Breath sounds: Normal breath sounds.   Abdominal:      General: Bowel sounds are normal. There is no distension.      Tenderness: There is no abdominal tenderness.   Genitourinary:     Rectum: No mass, tenderness, anal fissure, external hemorrhoid or internal hemorrhoid. Normal anal tone.   Musculoskeletal:         General: Normal range of motion.      Cervical back: Normal range of motion.   Skin:     Comments: Multiple abrasions from previous injuries   Neurological:      General: No focal deficit present.      Mental Status: She is alert. Mental status is at baseline.   Psychiatric:         Mood and Affect: Mood normal.         Behavior: Behavior normal.         Thought Content: Thought content normal.          Triage vitals:  T 36.4 °C (97.5 °F)  HR 72  /64  RR 18  O2 98 % None (Room  "air)    Medical Decision Making & ED Course     ED Course & MDM       Medical Decision Making:  Medical Decision Making  Lyndsey Rosado is a 84-year-old female with PMH of HTN, CKD 3, DM2, dementia, anemia, diverticulitis presenting for constipation; per triage note x 1 week.   On arrival to the ED patient is hemodynamically stable and afebrile.  Patient is awake and alert, abdomen is nondistended and nontender.  Chaperoned rectal exam without any palpable deformities or stool impaction; without gross blood.    CBC without leukocytosis or anemia.  CMP notable for decreased kidney function although stable compared to previous.  Patient given 1 L fluids.CT abdomen pelvis demonstrating formed stool within the colon although not severely distended.  To note there is a tiny nodule on the head of the pancreas although previously seen on imaging.  Discussed finding with patient and recommended she follow-up with her primary care provider for further discussion/imaging.    Discussed findings with patient in which she was grateful.  Patient was given printout of CT findings.  Advised patient to follow-up with her primary care provider for further workup of the pancreas nodule.  She expressed understanding.  Also advised patient to drink MiraLAX multiple times a day as well as continue home bowel regimen as she does not have stool burden within the rectum in which an enema would be required while in the ED.  Patient discharged in stable condition with all questions answered.    ----      Differential diagnoses considered include but are not limited to: Constipation, obstruction, diverticulitis, diverticulosis, mass, ileus       Visit Vitals  /62   Pulse 62   Temp 36.4 °C (97.5 °F) (Temporal)   Resp 18   Ht 1.626 m (5' 4\")   Wt 88.5 kg (195 lb)   SpO2 97%   BMI 33.47 kg/m²   OB Status Postmenopausal   Smoking Status Never   BSA 2 m²        Labs Reviewed   CBC WITH AUTO DIFFERENTIAL - Abnormal       Result Value    WBC " 9.7      nRBC 0.0      RBC 4.41      Hemoglobin 12.8      Hematocrit 41.5      MCV 94      MCH 29.0      MCHC 30.8 (*)     RDW 13.7      Platelets 243      Neutrophils % 79.1      Immature Granulocytes %, Automated 0.3      Lymphocytes % 13.6      Monocytes % 6.3      Eosinophils % 0.2      Basophils % 0.5      Neutrophils Absolute 7.69 (*)     Immature Granulocytes Absolute, Automated 0.03      Lymphocytes Absolute 1.32      Monocytes Absolute 0.61      Eosinophils Absolute 0.02      Basophils Absolute 0.05     COMPREHENSIVE METABOLIC PANEL - Abnormal    Glucose 191 (*)     Sodium 134 (*)     Potassium 3.8      Chloride 90 (*)     Bicarbonate 35 (*)     Anion Gap 13      Urea Nitrogen 37 (*)     Creatinine 1.58 (*)     eGFR 32 (*)     Calcium 9.6      Albumin 3.5      Alkaline Phosphatase 63      Total Protein 6.6      AST 19      Bilirubin, Total 0.6      ALT 10     MAGNESIUM - Normal    Magnesium 2.20         CT abdomen pelvis w IV contrast   Final Result   1.  Findings in keeping with the clinical impression of constipation   without other definite associated complication.   2. Tiny hypoattenuated nodule on the ventral margin of the pancreas   head which may have slightly increased in size from exam 3 months   ago. It is not apparent in 2021 although the exam was unenhanced. A   small enhancing neoplasm such as those described above could have   this appearance or a incidental tiny high attenuating cysts might   have this appearance. Is recommended further imaging be obtained   ideally high-quality contrast enhanced MRI of the pancreas although a   dedicated multiphasic CT with contrast enhancement of the pancreas   could be obtained. This should include unenhanced images. See   discussion above             MACRO:   None        Signed by: Joseph Schoenberger 6/6/2025 12:17 PM   Dictation workstation:   [id number]          ED Course:  ED Course as of 06/06/25 2020 Fri Jun 06, 2025   [id number] Patient was seen and  examined with the ELYSE.  84-year-old female presenting to the emergency department with constipation.  She reports that she has not had a bowel movement in the past week.  She denies any nausea or vomiting.  She denies any significant abdominal pain.  She states that she is still passing gas.  No fevers or chills.  She denies any urinary symptoms.  She is concerned about possible bowel obstruction.    On exam patient is afebrile and hemodynamically stable.  She is in no acute distress.  Heart is regular rate and rhythm with no murmurs.  Lungs are clear to auscultation.  Abdomen is soft and nontender, nondistended.    Given patient's age we will perform blood work and get CT scan of the abdomen pelvis to evaluate for evidence of bowel obstruction or other intra-abdominal pathology. [JJ]      ED Course User Index  [JJ] Skyler Hewitt DO         Diagnoses as of 06/06/25 2020   Constipation, unspecified constipation type     Disposition     As a result of the work-up, the patient was discharged home.  she was informed of his diagnosis and instructed to come back with any concerns or worsening of condition.  she was agreeable to the plan as discussed above.  she was given the opportunity to ask questions.  All of the patient's questions were answered.      Procedures   Procedures    This was a shared visit with an ED attending.  The patient was seen and discussed with the ED attending    Lisandra Cain PA-C  Emergency Medicine      Lisandra Cain PA-C  06/06/25 2022

## 2025-06-16 ENCOUNTER — APPOINTMENT (OUTPATIENT)
Dept: PRIMARY CARE | Facility: CLINIC | Age: 85
End: 2025-06-16
Payer: MEDICARE

## (undated) DEVICE — SUTURE, ETHILON, 2-0, FSLX 30, BLACK

## (undated) DEVICE — Device

## (undated) DEVICE — APPLICATOR, PREP, CHLORAPREP, W/ORANGE TINT, 10.5ML

## (undated) DEVICE — SUTURE, PDS II, 0, 27 IN, CT-2, VIOLET

## (undated) DEVICE — DRESSING, MEPILEX BORDER, POST-OP AG, 4 X 6 IN

## (undated) DEVICE — STAPLER, SKIN PROXIMATE, 35 WIDE

## (undated) DEVICE — DRAPE, INCISE, ANTIMICROBIAL, IOBAN 2, LARGE, 17 X 23 IN, DISPOSABLE, STERILE

## (undated) DEVICE — BANDAGE, GAUZE, CONFORMING, KERLIX, 6 PLY, 4.5 IN X 4.1 YD

## (undated) DEVICE — MANIFOLD, 4 PORT NEPTUNE STANDARD

## (undated) DEVICE — SUTURE, MONOCRYL, 2-0, 27 IN, SH/V-20 , UNDYED

## (undated) DEVICE — GUIDEWIRE, 2.2 X 800 SMOOTH TIP

## (undated) DEVICE — DRILL BIT, 1.8MM W/DEPTH MARKER/QC/110MM

## (undated) DEVICE — DRAPE, SHEET, U, W/ADHESIVE STRIP, IMPERVIOUS, 60 X 70 IN, DISPOSABLE, LF, STERILE

## (undated) DEVICE — DRAPE, SHEET, THREE QUARTER, FAN FOLD, 57 X 77 IN

## (undated) DEVICE — COVER, BACK TABLE, 65 X 90, HVY REINFORCED

## (undated) DEVICE — COVER, CART, 45 X 27 X 48 IN, CLEAR

## (undated) DEVICE — DRESSING, MEPILEX BORDER, POST-OP AG, 4 X 10 IN

## (undated) DEVICE — BANDAGE, COFLEX, 4 X 5 YDS, TAN, STERILE, LF

## (undated) DEVICE — ELECTRODE, ELECTROSURGICAL, BLADE, INSULATED, ENT/IMA, STERILE

## (undated) DEVICE — IRRIGATION SET, Y, LARGE BORE

## (undated) DEVICE — DRILL, 3.5 X 130

## (undated) DEVICE — TOWEL, SURGICAL, NEURO, O/R, 16 X 26, BLUE, STERILE

## (undated) DEVICE — BANDAGE, ELASTIC, MATRIX, SELF-CLOSURE, 4 IN X 5 YD, LF

## (undated) DEVICE — APPLICATOR, CHLORAPREP, W/ORANGE TINT, 26ML

## (undated) DEVICE — COVER, C-ARM W/CLIPS, OEC GE